# Patient Record
Sex: FEMALE | Race: OTHER | NOT HISPANIC OR LATINO | Employment: FULL TIME | URBAN - METROPOLITAN AREA
[De-identification: names, ages, dates, MRNs, and addresses within clinical notes are randomized per-mention and may not be internally consistent; named-entity substitution may affect disease eponyms.]

---

## 2017-08-01 ENCOUNTER — GENERIC CONVERSION - ENCOUNTER (OUTPATIENT)
Dept: OTHER | Facility: OTHER | Age: 47
End: 2017-08-01

## 2017-08-24 ENCOUNTER — ALLSCRIPTS OFFICE VISIT (OUTPATIENT)
Dept: OTHER | Facility: OTHER | Age: 47
End: 2017-08-24

## 2017-09-12 ENCOUNTER — GENERIC CONVERSION - ENCOUNTER (OUTPATIENT)
Dept: OTHER | Facility: OTHER | Age: 47
End: 2017-09-12

## 2017-09-21 ENCOUNTER — GENERIC CONVERSION - ENCOUNTER (OUTPATIENT)
Dept: OTHER | Facility: OTHER | Age: 47
End: 2017-09-21

## 2017-10-03 ENCOUNTER — GENERIC CONVERSION - ENCOUNTER (OUTPATIENT)
Dept: OTHER | Facility: OTHER | Age: 47
End: 2017-10-03

## 2017-10-12 ENCOUNTER — GENERIC CONVERSION - ENCOUNTER (OUTPATIENT)
Dept: OTHER | Facility: OTHER | Age: 47
End: 2017-10-12

## 2017-10-17 ENCOUNTER — GENERIC CONVERSION - ENCOUNTER (OUTPATIENT)
Dept: OTHER | Facility: OTHER | Age: 47
End: 2017-10-17

## 2017-11-06 ENCOUNTER — GENERIC CONVERSION - ENCOUNTER (OUTPATIENT)
Dept: OTHER | Facility: OTHER | Age: 47
End: 2017-11-06

## 2017-12-18 ENCOUNTER — ALLSCRIPTS OFFICE VISIT (OUTPATIENT)
Dept: OTHER | Facility: OTHER | Age: 47
End: 2017-12-18

## 2017-12-19 NOTE — PROGRESS NOTES
Assessment  1  Acute left otitis media (382 9) (H66 92)    Plan  Acute left otitis media    · Amoxicillin 500 MG Oral Tablet; TAKE 2 TABLET 3 times daily    Discussion/Summary    55yo female with left otitis media  has h/o bilateral ear tubes  given duration and worsening of symptoms do recommend initiating ABX therapy: Amox 1gm PO TID x 5 days  follow up prn  The patient was counseled regarding importance of compliance with treatment  The treatment plan was reviewed with the patient/guardian  The patient/guardian understands and agrees with the treatment plan      Chief Complaint  left ear pain      History of Present Illness  HPI: 55yo female with PMHx KIERRA and intermittent smoking presents c/o 1 week of facial pain, congestion, cough and in past 2 days developed left ear pain and muffled hearing  no fevers  + sick contact -> 2yo grandson  has been using nasal spray with no relief  cough is productive of clear to yellow sputum  nasal discharge was blood tinged today  Review of Systems   Constitutional: No fever, no chills, feels well, no tiredness, no recent weight gain or loss  ENT: as noted in HPI  Cardiovascular: no complaints of slow or fast heart rate, no chest pain, no palpitations, no leg claudication or lower extremity edema  Respiratory: cough, but-- no shortness of breath,-- no orthopnea,-- no wheezing,-- no shortness of breath during exertion-- and-- no PND  Gastrointestinal: no abdominal pain  ROS reviewed  Active Problems  1  Acute serous otitis media, unspecified laterality   2  Allergic rhinitis (477 9) (J30 9)   3  Amenorrhea (626 0) (N91 2)   4  Anxiety disorder (300 00) (F41 9)   5  Avulsion of fingertip, subsequent encounter (V58 89,883 0) (S61 209D)   6  Bacterial vaginosis (616 10,041 9) (N76 0,B96 89)   7  Breast cancer screening (V76 10) (Z12 39)   8  Chest congestion (786 9) (R09 89)   9  Chest pain (786 50) (R07 9)   10  Cigarette smoker (305 1) (F17 210)   11   Corneal abrasion (918 1) (S05 00XA)   12  Cough (786 2) (R05)   13  Dysuria (788 1) (R30 0)   14  Ear problems (V41 3) (H93 90)   15  Encounter for routine gynecological examination (V72 31) (Z01 419)   16  Encounter for smoking cessation counseling (V65 42,305 1) (Z71 6,Z72 0)   17  Eustachian tube dysfunction (381 81) (H69 80)   18  Fatigue (780 79) (R53 83)   19  Hyperlipidemia (272 4) (E78 5)   20  Nicotine dependence (305 1) (F17 200)   21  Pap smear for cervical cancer screening (V76 2) (Z12 4)   22  Plantar fasciitis (728 71) (M72 2)   23  Productive cough (786 2) (R05)   24  Sinus congestion (478 19) (R09 81)   25  Sore throat (462) (J02 9)   26  URI, acute (465 9) (J06 9)    Past Medical History  1  History of Acute nonsuppurative otitis media, unspecified laterality (381 00) (H65 199)   2  Encounter for smoking cessation counseling (V65 42,305 1) (Z71 6,Z72 0)   3  History of abdominal pain (V13 89) (Z87 898)   4  History of acute bronchitis (V12 69) (Z87 09)   5  History of Hypomenorrhea (626 1) (N91 5)   6  History of Nipple Discharge Right (611 79)   7  History of Routine Gynecological Exam With Cervical Pap Smear (V72 31)  Active Problems And Past Medical History Reviewed: The active problems and past medical history were reviewed and updated today  Family History  Mother    1  Family history of Acute Myocardial Infarction (V17 3)  Father    2  Family history of Diabetes Mellitus (V18 0)   3  Family history of Hyperlipidemia   4  Family history of Hypertension (V17 49)  Maternal Grandmother    5  Family history of malignant neoplasm of breast (V16 3) (Z80 3)    Social History   · Being A Social Drinker   · Cigarette smoker (305 1) (F17 210)   · Current every day smoker (305 1) (F17 200)  The social history was reviewed and updated today  Surgical History  1  Denied: History of Recent Surgery   2  History of Tubal Ligation    Current Meds   1  No Reported Medications Recorded    Allergies  1   No Known Drug Allergies  2  Pollen    Vitals   Recorded: 11MMR1539 09:01AM   Temperature 97 2 F   Heart Rate 68   Respiration 16   Systolic 947   Diastolic 70   Height 5 ft 3 in   Weight 178 lb 4 oz   BMI Calculated 31 58   BSA Calculated 1 84   Pain Scale 0     Physical Exam   Constitutional  General appearance: No acute distress, well appearing and well nourished  Eyes  Conjunctiva and lids: No swelling, erythema or discharge  Pupils and irises: Equal, round and reactive to light  Ears, Nose, Mouth, and Throat  External inspection of ears and nose: Normal    Otoscopic examination: Abnormal  -- right TM clear with scarred areas  left TM bulging and erythematous with areas of scars  Nasal mucosa, septum, and turbinates: Normal without edema or erythema  Oropharynx: Normal with no erythema, edema, exudate or lesions  Pulmonary  Respiratory effort: No increased work of breathing or signs of respiratory distress  Auscultation of lungs: Clear to auscultation  Cardiovascular  Auscultation of heart: Normal rate and rhythm, normal S1 and S2, without murmurs  Lymphatic  Palpation of lymph nodes in neck: No lymphadenopathy           Signatures   Electronically signed by : BHARATHI Vogel ; Dec 18 2017  9:24AM EST                       (Author)

## 2018-01-10 NOTE — PROGRESS NOTES
Assessment    1  Fatigue (780 79) (R53 83)   2  Encounter for preventive health examination (V70 0) (Z00 00)   3  Encounter for smoking cessation counseling (V65 42,305 1) (Z71 6,Z72 0)    Plan  Encounter for smoking cessation counseling, Nicotine dependence    · Chantix Starting Month Lamont 0 5 MG X 11 & 1 MG X 42 Oral Tablet; TAKE AS  DIRECTED PER PACKAGE INSTRUCTIONS  Fatigue    · (1) CBC/PLT/DIFF; Status:Complete;   Done: 31STI4681 11:00AM   · (1) COMPREHENSIVE METABOLIC PANEL; Status:Complete;   Done: 91DVT7579  11:00AM   · (1) TSH WITH FT4 REFLEX; Status:Active; Requested for:06May2016;   Hyperlipidemia    · Atorvastatin Calcium 10 MG Oral Tablet (Lipitor)   · Atorvastatin Calcium 20 MG Oral Tablet; TAKE 1 TABLET DAILY AS DIRECTED   · (1) LIPID PANEL, FASTING; Status:Active; Requested for:06May2016;     Discussion/Summary  health maintenance visit healthy adult female Currently, she eats a healthy diet and has an inadequate exercise regimen  the risks and benefits of cervical cancer screening were discussed next cervical cancer screening is due NOW Breast cancer screening: the next mammogram is due at age 48 and breast cancer screening is not indicated  Colorectal cancer screening: colorectal cancer screening is not indicated  Osteoporosis screening: bone mineral density testing is not indicated  Screening lab work includes hemoglobin, glucose, lipid profile and thyroid function testing  Advice and education were given regarding nutrition, aerobic exercise, tobacco cessation and sunscreen use  Patient discussion: discussed with the patient  56 yo F with:  1  Fatigue - will check CBC, CMP, TSH to r/o anemia, thyroid dysfunction, electrolyte abnormality, DM  2  Smoking Cessation - Pt has 30 pack year smoking history  Has desire to quit  Has tried wellbutrin, nicotine patch and inhaler in the past that has not helped  Has tried Chantix which had helped prior to her insurance not covering   Therefore will try chantix again  If insurance does not approve, we will appeal      3  Health maintenance - Elevated BP - repeat BP in office was 138/80, which is at goal  Will check FLP and start statin as indicated  Mammogram will start at age 48 per USPSTF guidelines  Will have pt return to office in 1-2 weeks for PAP w/ HPV  Colonoscopy not due yet  Counseled on aerobic exercise 150 minutes/week  Will call with results  Pt agrees with plan  Possible side effects of new medications were reviewed with the patient/guardian today  The patient was counseled regarding instructions for management, risk factor reductions, prognosis, patient and family education, impressions, risks and benefits of treatment options, importance of compliance with treatment  Self Referrals: No      Chief Complaint  Annual physical      History of Present Illness  HM, Adult Female: The patient is being seen for a health maintenance evaluation  The last health maintenance visit was 1 year(s) ago  Social History: Household members include spouse and Brother and grandson  She is   Work status: working full time and occupation:  at [employer], [place]  The patient is a current cigarette smoker  She has smoked for 30 year(s) and has 30 pack year(s) of cigarette use  She is ready to quit using tobacco  She reports occasional alcohol use and Twice a year  She has never used illicit drugs  General Health: The patient's health since the last visit is described as good  She has regular dental visits  The patient brushes 1 time(s) a day  Dental problems: no tooth pain  She complains of vision problems  Vision care includes wearing glasses  She denies hearing loss  Lifestyle:  She consumes a diverse and healthy diet  She does not have any weight concerns  She does not exercise regularly  She uses tobacco  She denies alcohol use  She denies drug use  Reproductive health: the patient is perimenopausal   she reports normal menses   she is not sexually active  Screening: Cervical cancer screening includes a pap smear performed 7017 and uncertain timing of her last human papilloma virus screening  Breast cancer screening includes no previous mammogram  She hasn't been previously screened for colorectal cancer  Metabolic screening includes uncertain timing of her last lipid profile, uncertain timing of her last glucose screening and uncertain timing of her last thyroid function test      Cardiovascular risk factors: high LDL cholesterol, tobacco use and sedentary lifestyle, but no stress, no obesity and no illicit drug use  General health risks: no previous breast cancer, no abnormal cervical cytology and no positive screening for human papilloma virus  HPI: 56 yo F presents to clinic for annual physical  At this time, pt has no active complaints  Denies fever, chills, CP, sob, palpitations, HA, dizziness, lightheadedness, numbness, tingling, abdominal pain, n/v/d/c, urinary problems  Does occasionally feel fatigue  Is interesting in quitting smoking  Tried wellbutrin but states it did not work  Tried nicotine patches and inhaler which did not work  Tried chantix in the past 1 year ago, which did help  But then stopped because of lack of insurance  Review of Systems    Constitutional: no fever, not feeling poorly, no recent weight gain, no chills and not feeling tired  Eyes: no eye pain, no eyesight problems and no purulent discharge from the eyes  ENT: no earache and no nasal discharge  Cardiovascular: No complaints of slow heart rate, no fast heart rate, no chest pain, no palpitations, no leg claudication, no lower extremity edema  Respiratory: No complaints of shortness of breath, no wheezing, no cough, no SOB on exertion, no orthopnea, no PND  Gastrointestinal: No complaints of abdominal pain, no constipation, no nausea or vomiting, no diarrhea, no bloody stools  Genitourinary: no dysuria, no pelvic pain and no incontinence  Musculoskeletal: no arthralgias and no myalgias  Integumentary: no rashes, no itching and no skin lesions  Neurological: no headache, no numbness, no tingling, no dizziness and no limb weakness  Psychiatric: no anxiety and no depression  Endocrine: no muscle weakness  Hematologic/Lymphatic: no swollen glands and no swollen glands in the neck  Active Problems     1  Acute serous otitis media, unspecified laterality   2  Amenorrhea (626 0) (N91 2)   3  Chest congestion (786 9) (R09 89)   4  Chest pain (786 50) (R07 9)   5  Corneal abrasion (918 1) (S05 00XA)   6  Dysuria (788 1) (R30 0)   7  Ear problems (V41 3) (H93 90)   8  Encounter for smoking cessation counseling (V65 42,305 1) (Z71 6,Z72 0)   9  Hyperlipidemia (272 4) (E78 5)   10  Nicotine dependence (305 1) (F17 200)   11  Plantar fasciitis (728 71) (M72 2)   12  Productive cough (786 2) (R05)   13  Sinus congestion (478 19) (R09 81)   14   Sore throat (462) (J02 9)   15  Upper respiratory infection (465 9) (J06 9)    Cough (786 2) (R05)       Eustachian tube dysfunction (381 81) (H69 80)       Cigarette smoker (305 1) (F17 210)          Past Medical History    · History of Acute nonsuppurative otitis media, unspecified laterality (381 00) (H65 199)   · Encounter for smoking cessation counseling (V65 42,305 1) (Z71 6,Z72 0)   · History of abdominal pain (V13 89) (D59 006)   · History of acute bronchitis (V12 69) (Z87 09)   · History of Hypomenorrhea (626 1) (N91 5)   · History of Nipple Discharge Right (611 79)   · History of Routine Gynecological Exam With Cervical Pap Smear (V72 31)    Surgical History    · Denied: History of Recent Surgery   · History of Tubal Ligation    Family History  Mother    · Family history of Acute Myocardial Infarction (V17 3)  Father    · Family history of Diabetes Mellitus (V18 0)   · Family history of Hyperlipidemia   · Family history of Hypertension (V17 49)    Social History     · Being A Social Drinker    Current every day smoker (305 1) (F17 200)       Cigarette smoker (305 1) (F17 210)          Current Meds   1  Atorvastatin Calcium 10 MG Oral Tablet; TAKE 1 TABLET DAILY AT BEDTIME; Therapy: 24GCO8687 to (Evaluate:18Jun2013); Last Rx:20Mar2013 Ordered   2  BuPROPion HCl ER (Smoking Det) 150 MG Oral Tablet Extended Release 12 Hour;   TAKE 1 TABLET ONCE A DAY FOR 2 DAYS THEN TAKE 1 TABLET TWICE A   DAY THEREAFTER; Therapy: 54RBE0657 to (Evaluate:27Szr9688)  Requested for: 08YYO9875; Last   Rx:11Jan2016 Ordered   3  Claritin 10 MG Oral Tablet; TAKE 1 TABLET AT BEDTIME; Therapy: 49BSO6400 to [phone]  Requested for: 19RPV5188; Last   Rx:01Nov2013 Ordered   4  Fexofenadine-Pseudoephed -240 MG Oral Tablet Extended Release 24 Hour;   TAKE 1 TABLET DAILY; Therapy: 43EDT3709 to (Lul Carlson)  Requested for: 18Apr2016; Last   Rx:26Vts5374 Ordered   5  Fluticasone Propionate 50 MCG/ACT Nasal Suspension; USE 2 SPRAYS IN EACH   NOSTRIL ONCE DAILY; Therapy: 50Wvq4046 to (Last Rx:15Kfz8591)  Requested for: 77Vng4913 Ordered   6  Lovastatin 20 MG Oral Tablet; TAKE 10 MG Daily; Therapy: 71ECP4326 to (Evaluate:15Ncp0470); Last Rx:96Fma2276 Ordered   7  Naproxen 500 MG Oral Tablet; TAKE 1 TABLET EVERY 12 HOURS DAILY; Therapy: 59RKW4203 to (Abdiel Chew)  Requested for: 12SSO2251; Last   FK:16MKN1507 Ordered   8  Simply Saline 0 9 % Nasal Aerosol Solution; USE AS DIRECTED ON PACKAGE; Therapy: 73ONV6702 to (Zully Sermons)  Requested for: 14NPD3848; Last   Rx:01Nov2013 Ordered    Allergies    1  No Known Drug Allergies    2  Pollen    Vitals   Recorded: 87FIX3083 04:39PM   Temperature 97 8 F   Heart Rate 82   Respiration 16   Systolic 687   Diastolic 90   Height 5 ft 3 in   Weight 172 lb 2 oz   BMI Calculated 30 49   BSA Calculated 1 81   Pain Scale 0     Physical Exam    Constitutional   General appearance: No acute distress, well appearing and well nourished      Eyes Conjunctiva and lids: No swelling, erythema or discharge  Pupils and irises: Equal, round and reactive to light  Ears, Nose, Mouth, and Throat   External inspection of ears and nose: Normal     Otoscopic examination: Abnormal     Oropharynx: Normal with no erythema, edema, exudate or lesions  Pulmonary   Respiratory effort: No increased work of breathing or signs of respiratory distress  Auscultation of lungs: Clear to auscultation  Cardiovascular   Auscultation of heart: Normal rate and rhythm, normal S1 and S2, without murmurs  Examination of extremities for edema and/or varicosities: Normal     Abdomen   Abdomen: Non-tender, no masses  Liver and spleen: No hepatomegaly or splenomegaly  Lymphatic   Palpation of lymph nodes in neck: No lymphadenopathy  Musculoskeletal   Gait and station: Normal     Digits and nails: Normal without clubbing or cyanosis  Inspection/palpation of joints, bones, and muscles: Normal     Skin   Skin and subcutaneous tissue: Normal without rashes or lesions  Neurologic   Cranial nerves: Cranial nerves 2-12 intact  Sensation: No sensory loss  Psychiatric   Orientation to person, place, and time: Normal        Results/Data  (1) COMPREHENSIVE METABOLIC PANEL 86RCI7001 92:50KU Radha Quivers Order Number: [id number]  TW Order Number: [id number] Order Number: [id number] Order Number: [id number]     Test Name Result Flag Reference   GLUCOSE,RANDM 88 mg/dL     If the patient is fasting, the ADA then defines impaired fasting glucose as > 100 mg/dL and diabetes as > or equal to 123 mg/dL     SODIUM 140 mmol/L  136-145   POTASSIUM 3 7 mmol/L  3 5-5 3   CHLORIDE 108 mmol/L  100-108   CARBON DIOXIDE 24 mmol/L  21-32   ANION GAP (CALC) 8 mmol/L  4-13   BLOOD UREA NITROGEN 18 mg/dL  5-25   CREATININE 0 64 mg/dL  0 60-1 30   Standardized to IDMS reference method   CALCIUM 8 4 mg/dL  8 3-10 1   BILI, TOTAL 0 36 mg/dL  0 20-1 00   ALK PHOSPHATAS 95 U/L     ALT (SGPT) 15 U/L  12-78   AST(SGOT) 6 U/L  5-45   ALBUMIN 3 8 g/dL  3 5-5 0   TOTAL PROTEIN 6 8 g/dL  6 4-8 2   eGFR Non-African American      >60 0 ml/min/1 73sq m   [facility] Disease Education Program recommendations are as follows:  GFR calculation is accurate only with a steady state creatinine  Chronic Kidney disease less than 60 ml/min/1 73 sq  meters  Kidney failure less than 15 ml/min/1 73 sq  meters  (1) CBC/PLT/DIFF 32ALR7636 11:00AM [employer] Order Number: [id number]  TW Order Number: [id number]     Test Name Result Flag Reference   WBC COUNT 9 13 Thousand/uL  4 31-10 16   RBC COUNT 4 09 Million/uL  3 81-5 12   HEMOGLOBIN 12 4 g/dL  11 5-15 4   HEMATOCRIT 36 8 %  34 8-46  1   MCV 90 fL  82-98   MCH 30 3 pg  26 8-34 3   MCHC 33 7 g/dL  31 4-37 4   RDW 13 7 %  11 6-15 1   MPV 9 5 fL  8 9-12 7   PLATELET COUNT 341 Thousands/uL  149-390   nRBC AUTOMATED 0 /100 WBCs     NEUTROPHILS RELATIVE PERCENT 63 %  43-75   LYMPHOCYTES RELATIVE PERCENT 30 %  14-44   MONOCYTES RELATIVE PERCENT 6 %  4-12   EOSINOPHILS RELATIVE PERCENT 1 %  0-6   BASOPHILS RELATIVE PERCENT 0 %  0-1   NEUTROPHILS ABSOLUTE COUNT 5 67 Thousands/?L  1 85-7 62   LYMPHOCYTES ABSOLUTE COUNT 2 74 Thousands/?L  0 60-4 47   MONOCYTES ABSOLUTE COUNT 0 55 Thousand/?L  0 17-1 22   EOSINOPHILS ABSOLUTE COUNT 0 13 Thousand/?L  0 00-0 61   BASOPHILS ABSOLUTE COUNT 0 03 Thousands/?L  0 00-0 10     (1) LIPID PANEL, FASTING 02VEG3330 11:00AM Cherrie Bertrandwater Order Number: [id number]  TW Order Number: [id number] Order Number: [id number] Order Number: [id number]     Test Name Result Flag Reference   CHOLESTEROL 268 mg/dL H    HDL,DIRECT 39 mg/dL L 40-60   Specimen collection should occur prior to Metamizole administration due to the potential for falsely depressed results     LDL CHOLESTEROL CALCULATED 217 mg/dL H 0-100 Triglyceride:         Normal              <150 mg/dl       Borderline High    150-199 mg/dl       High               200-499 mg/dl       Very High          >499 mg/dl  Cholesterol:         Desirable        <200 mg/dl      Borderline High  200-239 mg/dl      High             >239 mg/dl  HDL Cholesterol:        High    >59 mg/dL      Low     <41 mg/dL  LDL CALCULATED:    This screening LDL is a calculated result  It does not have the accuracy of the Direct Measured LDL in the monitoring of patients with hyperlipidemia and/or statin therapy  Direct Measure LDL (XBE016) must be ordered separately in these patients  TRIGLYCERIDES 60 mg/dL  <=150   Specimen collection should occur prior to N-Acetylcysteine or Metamizole administration due to the potential for falsely depressed results  (1) TSH WITH FT4 REFLEX 14BMW2771 11:00AM Shirley Deepa Order Number: [id number]  TW Order Number: [id number] Order Number: [id number] Order Number: [id number]     Test Name Result Flag Reference   TSH 1 610 uIU/mL  0 358-3 740   The recommended reference ranges for TSH during pregnancy are as follows:  First trimester 0 1 to 2 5 uIU/mL  Second trimester  0 2 to 3 0 uIU/mL  Third trimester 0 3 to 3 0 uIU/m       Attending Note  Attending Note: Attending Note: I discussed the case with the Resident and reviewed the Resident's note, I supervised the Resident and I agree with the Resident management plan as it was presented to me  Level of Participation: I was present in clinic, but did not examine the patient  I agree with the Resident's note        Future Appointments    Date/Time Provider Specialty Site   05/20/2016 04:00 PM [facility]     Signatures   Electronically signed by : Nikki Bradford, ; May  6 2016  5:30PM EST                       (Author)    Electronically signed by : Katherine David DO; May 13 2016  1:58PM EST (Author)

## 2018-01-10 NOTE — MISCELLANEOUS
Provider Comments  Provider Comments:   PT WAS A NO SHOW TODAY      Signatures   Electronically signed by : Wilda Estevez, ; Apr 26 2016  6:55PM EST                       (Author)    Electronically signed by : Baldo Lawler, ; Apr 27 2016  5:34AM EST                       (Author)    Electronically signed by : Verner Muff, M D ; May  3 2016  1:45PM EST                       (Author)

## 2018-01-12 NOTE — MISCELLANEOUS
Provider Comments  Provider Comments:   PT NO SHOWED TODAY      Signatures   Electronically signed by : Iliana Taylor, ; Oct 12 2017  5:23PM EST                       (Author)

## 2018-01-14 VITALS
HEIGHT: 63 IN | WEIGHT: 184 LBS | BODY MASS INDEX: 32.6 KG/M2 | HEART RATE: 74 BPM | TEMPERATURE: 97.8 F | SYSTOLIC BLOOD PRESSURE: 132 MMHG | RESPIRATION RATE: 18 BRPM | DIASTOLIC BLOOD PRESSURE: 70 MMHG

## 2018-01-22 VITALS
SYSTOLIC BLOOD PRESSURE: 134 MMHG | HEIGHT: 63 IN | RESPIRATION RATE: 16 BRPM | HEART RATE: 68 BPM | BODY MASS INDEX: 31.58 KG/M2 | TEMPERATURE: 97.2 F | WEIGHT: 178.25 LBS | DIASTOLIC BLOOD PRESSURE: 70 MMHG

## 2018-01-22 VITALS
TEMPERATURE: 99.2 F | RESPIRATION RATE: 14 BRPM | SYSTOLIC BLOOD PRESSURE: 142 MMHG | WEIGHT: 187.38 LBS | DIASTOLIC BLOOD PRESSURE: 80 MMHG | BODY MASS INDEX: 33.2 KG/M2 | HEART RATE: 88 BPM | HEIGHT: 63 IN

## 2018-01-30 ENCOUNTER — OFFICE VISIT (OUTPATIENT)
Dept: FAMILY MEDICINE CLINIC | Facility: CLINIC | Age: 48
End: 2018-01-30
Payer: COMMERCIAL

## 2018-01-30 ENCOUNTER — TELEPHONE (OUTPATIENT)
Dept: OTHER | Facility: HOSPITAL | Age: 48
End: 2018-01-30

## 2018-01-30 VITALS
RESPIRATION RATE: 14 BRPM | DIASTOLIC BLOOD PRESSURE: 80 MMHG | BODY MASS INDEX: 30.82 KG/M2 | HEIGHT: 65 IN | HEART RATE: 82 BPM | WEIGHT: 185 LBS | TEMPERATURE: 98.9 F | SYSTOLIC BLOOD PRESSURE: 144 MMHG

## 2018-01-30 DIAGNOSIS — N95.0 POSTMENOPAUSAL BLEEDING: ICD-10-CM

## 2018-01-30 DIAGNOSIS — R19.5 LOOSE STOOLS: ICD-10-CM

## 2018-01-30 DIAGNOSIS — R10.9 FLANK PAIN: Primary | ICD-10-CM

## 2018-01-30 LAB
SL AMB  POCT GLUCOSE, UA: NEGATIVE
SL AMB LEUKOCYTE ESTERASE,UA: NEGATIVE
SL AMB POCT BILIRUBIN,UA: NEGATIVE
SL AMB POCT BLOOD,UA: NEGATIVE
SL AMB POCT CLARITY,UA: CLEAR
SL AMB POCT COLOR,UA: YELLOW
SL AMB POCT KETONES,UA: NEGATIVE
SL AMB POCT NITRITE,UA: NEGATIVE
SL AMB POCT PH,UA: 6
SL AMB POCT SPECIFIC GRAVITY,UA: 1.01

## 2018-01-30 PROCEDURE — 99213 OFFICE O/P EST LOW 20 MIN: CPT | Performed by: FAMILY MEDICINE

## 2018-01-30 PROCEDURE — 81002 URINALYSIS NONAUTO W/O SCOPE: CPT | Performed by: FAMILY MEDICINE

## 2018-01-30 NOTE — ASSESSMENT & PLAN NOTE
-Transvaginal ultrasound ordered to rule out endometrial pathology  -follow up after ultrasound complete

## 2018-01-30 NOTE — PATIENT INSTRUCTIONS
Postmenopausal bleeding  -Transvaginal ultrasound ordered to rule out endometrial pathology  -follow up after ultrasound complete    Flank pain  -ddx includes lumbosacral pain vs kidney stone  -CT renal study ordered to rule out kidney stone  -follow up in 1 month to re-assess    Loose stools  -likely 2/2 h/o IBS  -follow up in 1 month   If persistent issue, consider further evaluation

## 2018-01-30 NOTE — PROGRESS NOTES
Assessment/Plan:    Postmenopausal bleeding  -Transvaginal ultrasound ordered to rule out endometrial pathology  -follow up after ultrasound complete    Flank pain  -ddx includes lumbosacral pain vs kidney stone  -CT renal study ordered to rule out kidney stone  -follow up in 1 month to re-assess    Loose stools  -likely 2/2 h/o IBS  -follow up in 1 month  If persistent issue, consider further evaluation      Subjective:      Patient ID: Farhana Williamson is a 52 y o  female  HPI   Patient is a pleasant 53 yo F presenting for acute visit for vaginal spotting, diarrhea  Friday, started having vaginal spotting for 3 days  Last night, started having diarrhea x2; loose, no blood, nonwatery  Notes h/o constipation/diarrhea alternating when she was dx with IBS  Last time had period was August, 2016  No spotting since  Cramping  Spotting on toilet paper and on underwear  No relation to bowel movement  No gross hematuria  2 weeks ago, also started having b/l back pain  History of kidney stones in the past - feels like the beginning of when she had them in the past  Last episode about 4 years ago  The following portions of the patient's history were reviewed and updated as appropriate: allergies, current medications, past family history, past medical history, past social history, past surgical history and problem list     Review of Systems   Constitutional: Negative for chills and fever  HENT: Negative for congestion, postnasal drip and sore throat  Respiratory: Negative for cough, shortness of breath and wheezing  Cardiovascular: Negative for chest pain and palpitations  Gastrointestinal: Positive for diarrhea  Negative for abdominal pain, nausea and vomiting  Genitourinary: Negative for decreased urine volume and difficulty urinating  Musculoskeletal: Positive for back pain  Neurological: Negative for dizziness and light-headedness           Objective:  Vitals:    01/30/18 1602   BP: 144/80   Pulse: 82   Resp: 14   Temp: 98 9 °F (37 2 °C)          Physical Exam   Constitutional: She is oriented to person, place, and time  She appears well-developed and well-nourished  No distress  HENT:   Head: Normocephalic and atraumatic  Eyes: Conjunctivae are normal  Right eye exhibits no discharge  Left eye exhibits no discharge  Neck: Neck supple  Cardiovascular: Normal rate and regular rhythm  Pulmonary/Chest: Effort normal and breath sounds normal  No respiratory distress  She has no wheezes  She has no rales  Abdominal: Soft  Bowel sounds are normal  She exhibits no distension  There is no tenderness  There is no rebound  Musculoskeletal:   Mild CVA tenderness b/l  SI joint tenderness b/l  Neurological: She is alert and oriented to person, place, and time  No cranial nerve deficit  Skin: Skin is warm and dry  No rash noted  She is not diaphoretic  No erythema  No pallor  Psychiatric: She has a normal mood and affect  Her behavior is normal  Judgment and thought content normal    Nursing note and vitals reviewed

## 2018-01-30 NOTE — ASSESSMENT & PLAN NOTE
-ddx includes lumbosacral pain vs kidney stone  -CT renal study ordered to rule out kidney stone  -follow up in 1 month to re-assess

## 2018-01-30 NOTE — TELEPHONE ENCOUNTER
Patient called the triage line with complaints of vaginal spotting and diarrhea per phone message- call returned with no answer  Left a message instructing her to call the triage line again if she has further concerns or questions, and to call the office in the morning

## 2018-02-01 ENCOUNTER — TELEPHONE (OUTPATIENT)
Dept: INTERNAL MEDICINE CLINIC | Facility: CLINIC | Age: 48
End: 2018-02-01

## 2018-02-01 NOTE — TELEPHONE ENCOUNTER
This patient was scheduled for CT OF ABDOMEN AND PELVIS ON 02/07/2018  The information I submitted was not enough to get it approved, so [employer]  is requesting a [id number]  If you are able to do so, the number is below  If you wish to withdraw this request let us know so we can call central scheduling to cancel their upcoming appointment  This case is time sensitive  Please call peer to peer as soon as you can  [employer]:  [phone]    Insurance: [address]   ID#: [id number]    [address] [place] [address] Ave!

## 2018-02-05 NOTE — TELEPHONE ENCOUNTER
Completed process for Auth  Updated referral in Epic with Auth number  Reason they stated no Auth was submitted was because Auth was under Dr Rohit Martinez as Provider not Dr Omero Lora

## 2018-02-05 NOTE — TELEPHONE ENCOUNTER
Called the peer to peer line for the patient's insurance and spoke with a physician  They had no records of having the order placed nor authorization process begun, including that a peer to peer was needed  I then spoke to an authorization specialist, Rahat Obrien  (he would not give me the rest of his last name) and I went through the authorization process with him  He said that it is approved, valid 2-5-18 to 3-6-18  Order number [id number]  Thank you!

## 2018-02-08 ENCOUNTER — HOSPITAL ENCOUNTER (OUTPATIENT)
Dept: RADIOLOGY | Facility: HOSPITAL | Age: 48
Discharge: HOME | End: 2018-02-08
Payer: COMMERCIAL

## 2018-02-08 ENCOUNTER — TELEPHONE (OUTPATIENT)
Dept: FAMILY MEDICINE CLINIC | Facility: CLINIC | Age: 48
End: 2018-02-08

## 2018-02-08 ENCOUNTER — TRANSCRIBE ORDERS (OUTPATIENT)
Dept: ADMISSIONS | Facility: HOSPITAL | Age: 48
End: 2018-02-08

## 2018-02-08 DIAGNOSIS — R10.9 FLANK PAIN: ICD-10-CM

## 2018-02-08 DIAGNOSIS — N95.0 POSTMENOPAUSAL BLEEDING: ICD-10-CM

## 2018-02-08 PROCEDURE — 76830 TRANSVAGINAL US NON-OB: CPT

## 2018-02-08 PROCEDURE — 74176 CT ABD & PELVIS W/O CONTRAST: CPT

## 2018-02-08 PROCEDURE — 76856 US EXAM PELVIC COMPLETE: CPT

## 2018-02-08 NOTE — TELEPHONE ENCOUNTER
Called patient to discuss CT renal study and transvaginal U/S that was completed 2/8/18  The CT showed a punctate non-obstructing right renal calculus but no obstructive uropathy or hydronephrosis  The transvaginal U/S showed unremarkable endometrium and small cystic foci along the endocervical mucosa  Reassured patient that her imaging studies were unremarkable  Patient notes that her spotting has resolved but she's still having lower back pain  Advised that she could use Tylenol or Motrin PRN for her back pain, and that if it did not significantly improve over the coming couple weeks to make an appt to further discuss  Patient voiced understanding and had no further questions

## 2018-03-13 ENCOUNTER — OFFICE VISIT (OUTPATIENT)
Dept: FAMILY MEDICINE CLINIC | Facility: CLINIC | Age: 48
End: 2018-03-13
Payer: COMMERCIAL

## 2018-03-13 VITALS
WEIGHT: 183.8 LBS | BODY MASS INDEX: 31.38 KG/M2 | RESPIRATION RATE: 16 BRPM | SYSTOLIC BLOOD PRESSURE: 130 MMHG | HEART RATE: 86 BPM | DIASTOLIC BLOOD PRESSURE: 90 MMHG | HEIGHT: 64 IN

## 2018-03-13 DIAGNOSIS — J06.9 VIRAL UPPER RESPIRATORY TRACT INFECTION: ICD-10-CM

## 2018-03-13 DIAGNOSIS — N95.0 POSTMENOPAUSAL BLEEDING: Primary | ICD-10-CM

## 2018-03-13 PROCEDURE — 99213 OFFICE O/P EST LOW 20 MIN: CPT | Performed by: FAMILY MEDICINE

## 2018-03-13 RX ORDER — ATORVASTATIN CALCIUM 10 MG/1
10 TABLET, FILM COATED ORAL
COMMUNITY
End: 2018-03-22

## 2018-03-13 RX ORDER — BENZONATATE 200 MG/1
200 CAPSULE ORAL 3 TIMES DAILY PRN
Qty: 50 CAPSULE | Refills: 1 | Status: SHIPPED | OUTPATIENT
Start: 2018-03-13 | End: 2018-03-28

## 2018-03-13 RX ORDER — IBUPROFEN 600 MG/1
600 TABLET ORAL EVERY 6 HOURS
COMMUNITY
Start: 2016-10-27 | End: 2018-04-05

## 2018-03-13 RX ORDER — SENNOSIDES 8.6 MG
650 CAPSULE ORAL EVERY 8 HOURS
COMMUNITY
End: 2018-03-22

## 2018-03-13 NOTE — PROGRESS NOTES
Laura Granados 1970 female MRN: [id number]    Family Medicine Follow-up Visit    ASSESSMENT/PLAN  Problem List Items Addressed This Visit     Postmenopausal bleeding - Primary     -unremarkable transvaginal u/s both through [place] [address] and [facility] (repeated when in their ER)  -discussed treatment and further work up options including EMB and hormone treatment  Patient interested in following up with OB/GYN as patient is also interested in getting hysterectomy to definitely treat bleeding  Discussed that hysterectomy may not be the best treatment yet, but am happy to refer to specialist for further evaluation  -OB/GYN referral given, appreciate their recs  -follow up as needed         Relevant Orders    Ambulatory referral to Obstetrics / Gynecology      Other Visit Diagnoses     Viral upper respiratory tract infection        -reassurance given, supportive care  -Magic Mouthwash and Tessalon Perles for symptomatic tx of sore throat and cough  -f/u PRN    Relevant Medications    al mag oxide-diphenhydramine-lidocaine viscous (MAGIC MOUTHWASH) 1:1:1 suspension    benzonatate (TESSALON) 200 MG capsule              No future appointments  SUBJECTIVE  CC: Follow-up (went to ER with heavy bleading, follow up from a month ago ) and Sore Throat (last couple of days )      HPI:  Laura Granados is a 50 y o  female who presents for follow up post-menopausal bleeding  Patient was seen 1/30/18 and at this time, was just on the toilet paper  Now, it got worse and went to [facility] 3/9/18 for worsening bleeding  Thursday, started spotting again and then starting having clots - went through 50 tampons  In ED, got Zofran for nausea  Also starting having abdominal cramping  Similar to previous periods but heavier than normal flow  No bowel/bladder incontinence or flank pain  Last period was over 2 years ago  Also notes 2 days of URI symptoms including ear pressure  No fevers/chills  Mild wheezing  Every day smoker  Review of Systems   Constitutional: Negative for chills and fever  HENT: Positive for congestion and ear pain  Respiratory: Positive for wheezing  Negative for cough and shortness of breath  Cardiovascular: Negative for chest pain  Gastrointestinal: Negative for blood in stool, constipation, nausea and vomiting  Genitourinary: Positive for pelvic pain and vaginal bleeding          Cramping         Historical Information   The patient history was reviewed as follows:    Past Medical History:   Diagnosis Date    Discharge from right nipple     last assessed 03/20/2013    Hyperlipidemia      Past Surgical History:   Procedure Laterality Date    TUBAL LIGATION      WRIST SURGERY Bilateral      Family History   Problem Relation Age of Onset    Heart attack Mother     Diabetes Father     Hypertension Father     Hyperlipidemia Father     Breast cancer Maternal Grandmother       Social History   History   Alcohol Use    Yes     Comment: occasional     History   Drug Use No     History   Smoking Status    Former Smoker   Smokeless Tobacco    Former User       Medications:     Current Outpatient Prescriptions:     ibuprofen (MOTRIN) 600 mg tablet, Take 600 mg by mouth every 6 (six) hours, Disp: , Rfl:     acetaminophen (TYLENOL) 650 mg CR tablet, Take 650 mg by mouth every 8 (eight) hours, Disp: , Rfl:     al mag oxide-diphenhydramine-lidocaine viscous (MAGIC MOUTHWASH) 1:1:1 suspension, Swish and spit 10 mL every 4 (four) hours as needed for mouth pain or discomfort for up to 10 days, Disp: 180 mL, Rfl: 1    atorvastatin (LIPITOR) 10 mg tablet, Take 10 mg by mouth, Disp: , Rfl:     benzonatate (TESSALON) 200 MG capsule, Take 1 capsule (200 mg total) by mouth 3 (three) times a day as needed for cough for up to 15 days, Disp: 50 capsule, Rfl: 1  Allergies   Allergen Reactions    Pollen Extract Allergic Rhinitis and Sneezing       OBJECTIVE    Vitals:   Vitals:    03/13/18 1446 BP: 130/90   Pulse: 86   Resp: 16   Weight: 83 4 kg (183 lb 12 8 oz)   Height: 5' 4 4" (1 636 m)           Physical Exam   Constitutional: She is oriented to person, place, and time  She appears well-developed and well-nourished  No distress  HENT:   Head: Normocephalic and atraumatic  Eyes: Conjunctivae are normal  Left eye exhibits no discharge  Neck: Neck supple  Cardiovascular: Normal rate and regular rhythm  Pulmonary/Chest: Effort normal and breath sounds normal  No respiratory distress  She has no wheezes  She has no rales  Abdominal: Soft  Bowel sounds are normal  She exhibits no distension  There is tenderness (suprapubic tenderness b/l with deep palpation)  There is no rebound  Neurological: She is alert and oriented to person, place, and time  No cranial nerve deficit  Skin: Skin is warm and dry  No rash noted  She is not diaphoretic  No erythema  No pallor  Psychiatric: She has a normal mood and affect  Her behavior is normal  Judgment and thought content normal    Nursing note and vitals reviewed             Kalpana Willis DO, PGY-3  [facility]   3/13/2018

## 2018-03-13 NOTE — ASSESSMENT & PLAN NOTE
-unremarkable transvaginal u/s both through [address] and [facility] (repeated when in their ER)  -discussed treatment and further work up options including EMB and hormone treatment  Patient interested in following up with OB/GYN as patient is also interested in getting hysterectomy to definitely treat bleeding   Discussed that hysterectomy may not be the best treatment yet, but am happy to refer to specialist for further evaluation  -OB/GYN referral given, appreciate their recs  -follow up as needed

## 2018-03-22 ENCOUNTER — OFFICE VISIT (OUTPATIENT)
Dept: OBGYN CLINIC | Facility: HOSPITAL | Age: 48
End: 2018-03-22
Payer: COMMERCIAL

## 2018-03-22 VITALS
HEART RATE: 90 BPM | HEIGHT: 63 IN | BODY MASS INDEX: 32.6 KG/M2 | WEIGHT: 184 LBS | SYSTOLIC BLOOD PRESSURE: 148 MMHG | DIASTOLIC BLOOD PRESSURE: 90 MMHG

## 2018-03-22 DIAGNOSIS — N95.0 POSTMENOPAUSAL BLEEDING: Primary | ICD-10-CM

## 2018-03-22 PROCEDURE — 88305 TISSUE EXAM BY PATHOLOGIST: CPT | Performed by: PATHOLOGY

## 2018-03-22 PROCEDURE — 58100 BIOPSY OF UTERUS LINING: CPT | Performed by: OBSTETRICS & GYNECOLOGY

## 2018-03-22 PROCEDURE — 99213 OFFICE O/P EST LOW 20 MIN: CPT | Performed by: OBSTETRICS & GYNECOLOGY

## 2018-03-22 RX ORDER — AZITHROMYCIN 250 MG/1
TABLET, FILM COATED ORAL
Refills: 0 | COMMUNITY
Start: 2018-03-15 | End: 2018-03-22

## 2018-03-22 RX ORDER — DEXTROMETHORPHAN HYDROBROMIDE AND PROMETHAZINE HYDROCHLORIDE 15; 6.25 MG/5ML; MG/5ML
5 SYRUP ORAL 4 TIMES DAILY
COMMUNITY
Start: 2018-03-14 | End: 2018-03-24

## 2018-03-22 NOTE — PROGRESS NOTES
Endometrial biopsy  Date/Time: 3/22/2018 2:01 PM  Performed by: Hayn Mejia by: Morgan Lawler     Consent:     Consent obtained:  Verbal    Consent given by:  Patient    Procedural risks discussed:  Bleeding, failure rate, infection, possible continued pain and repeat procedure    Patient questions answered: yes      Patient agrees, verbalizes understanding, and wants to proceed: yes      Instructions and paperwork completed: yes    Indication:     Indications: Post-menopausal bleeding      Chronicity of post-menopausal bleeding:  New    Progression of post-menopausal bleeding:  Resolved  Pre-procedure:     Pre-procedure timeout performed: yes    Procedure:     Procedure: endometrial biopsy with Pipelle      A bivalve speculum was placed in the vagina: yes      Cervix cleaned and prepped: yes      A paracervical block was performed: no      An intracervical block was performed: no      The cervix was dilated: no      Uterus sounded: yes      Uterus sound depth (cm):  8    Specimen collected: specimen collected and sent to pathology      Patient tolerated procedure well with no complications: yes    Findings:     Uterus size:  Non-gravid    Cervix: normal      Adnexa: normal    Comments:      Pt is a 48yo P2 who was referred to [facility] from PCP for Postmenopausal bleeding needing EMB  Pt states LMP was 8/2016 and did not have any vaginal bleeding until March 8th  She states she had pelvic cramping and vaginal bleeding that required the use of a whole box of 52 super-absorbant tampons over a day and a half  Pt states she was changing her tampon every 30-45mins  She was seen and evaluated at [place] [address] on 3/9  At that time, she had a work-up that consisted of a CBC and TVUS among other labs (refer to documents in chart)  Hgb was 13 5 and pt's bleeding was improving  TVUS demonstrated an EMS of 4mm, otherwise unremarkable   A TVUS in 2/2018 performed at [facility] demonstrated an EMS of 4mm, but also a R anterior uterine body myoma  Pap smear in 5/2016 was WNL  Pt states bleeding resolved on 3/11/18  She denies any other symptoms  GEN: AA&Ox3, NAD  Pelvic Exam:   Normal appearing external genitalia  Bimanual exam demonstrated anteverted uterus, with TTP on deep palpation of suprapubic area, cystocele appreciated     No bleeding noted on speculum exam    A/P: 47yo P2 with PMB   EMB performed in office today  RTO in 2 weeks for results    Dr Rafia Carson was present for procedure    Sarthak Sprague MD  OBGYN, PGY-1  3/22/2018 2:11 PM

## 2018-04-05 ENCOUNTER — OFFICE VISIT (OUTPATIENT)
Dept: OBGYN CLINIC | Facility: HOSPITAL | Age: 48
End: 2018-04-05
Payer: COMMERCIAL

## 2018-04-05 VITALS
SYSTOLIC BLOOD PRESSURE: 144 MMHG | BODY MASS INDEX: 32.5 KG/M2 | HEART RATE: 76 BPM | DIASTOLIC BLOOD PRESSURE: 92 MMHG | WEIGHT: 183.4 LBS | HEIGHT: 63 IN

## 2018-04-05 DIAGNOSIS — Z12.31 ENCOUNTER FOR SCREENING MAMMOGRAM FOR BREAST CANCER: ICD-10-CM

## 2018-04-05 DIAGNOSIS — N95.0 POSTMENOPAUSAL BLEEDING: ICD-10-CM

## 2018-04-05 DIAGNOSIS — Z71.2 ENCOUNTER TO DISCUSS TEST RESULTS: Primary | ICD-10-CM

## 2018-04-05 PROCEDURE — 99213 OFFICE O/P EST LOW 20 MIN: CPT | Performed by: OBSTETRICS & GYNECOLOGY

## 2018-04-05 NOTE — PROGRESS NOTES
Assessment/Plan:      Diagnoses and all orders for this visit:    Encounter to discuss test results  Final Diagnosis   A  Endometrium, biopsy:  - Proliferative endometrium, negative for hyperplasia or atypia       - Pt has not had any further bleeding  - Reviewed causes for PMB and results of EMB; discussed, after review of case with Dr Pauly Gates on 4/4/18, that it was most likely one last period; However, Pt advised to be re-evaluated if continued bleeding as recurrent bleeding would not be considered normal  Encouraged weight loss as it is a modifiable risk factor  Counseled on healthier diet and exercise     Postmenopausal bleeding    Encounter for screening mammogram for breast cancer  -     Mammo screening bilateral w 3d & cad; Future    Health Maintenance  -Last pap smear with HPV co-testing negative 5/2016; due in 2021  -F/u in 1 year for annual exam        Subjective:     Patient ID: Javan George is a 50 y o  female  Pt is 51 yo female presents for discussion of test results  She is s/p EMB on 3/22 for c/o an episode of PMB 3/9-3/11/18  She became menopausal 8/2016  Pt states she has not had any more bleeding since that episode  Pt has no complaints this morning  Review of Systems   Genitourinary: Negative for pelvic pain and vaginal bleeding  Objective:  Vitals:    04/05/18 1353   BP: 144/92   Pulse: 76        Physical Exam   Constitutional: She is oriented to person, place, and time  She appears well-developed and well-nourished  No distress  HENT:   Head: Normocephalic and atraumatic  Neurological: She is alert and oriented to person, place, and time  Psychiatric: She has a normal mood and affect  Her behavior is normal    Vitals reviewed        Discussed case with Dr Pauly Gates on 4/4/18    Jaylen Ayala MD  OBGYN, PGY-1  4/5/2018 2:23 PM

## 2018-04-10 ENCOUNTER — HOSPITAL ENCOUNTER (OUTPATIENT)
Dept: RADIOLOGY | Age: 48
Discharge: HOME | End: 2018-04-10
Payer: COMMERCIAL

## 2018-04-10 DIAGNOSIS — Z12.31 ENCOUNTER FOR SCREENING MAMMOGRAM FOR BREAST CANCER: ICD-10-CM

## 2018-04-10 PROCEDURE — 77067 SCR MAMMO BI INCL CAD: CPT

## 2018-04-10 PROCEDURE — 77063 BREAST TOMOSYNTHESIS BI: CPT

## 2018-04-19 ENCOUNTER — OFFICE VISIT (OUTPATIENT)
Dept: FAMILY MEDICINE CLINIC | Facility: CLINIC | Age: 48
End: 2018-04-19
Payer: COMMERCIAL

## 2018-04-19 ENCOUNTER — TELEPHONE (OUTPATIENT)
Dept: FAMILY MEDICINE CLINIC | Facility: CLINIC | Age: 48
End: 2018-04-19

## 2018-04-19 VITALS
SYSTOLIC BLOOD PRESSURE: 138 MMHG | TEMPERATURE: 97 F | BODY MASS INDEX: 30.32 KG/M2 | WEIGHT: 182 LBS | RESPIRATION RATE: 16 BRPM | HEART RATE: 80 BPM | DIASTOLIC BLOOD PRESSURE: 80 MMHG | HEIGHT: 65 IN

## 2018-04-19 DIAGNOSIS — J06.9 UPPER RESPIRATORY TRACT INFECTION, UNSPECIFIED TYPE: ICD-10-CM

## 2018-04-19 DIAGNOSIS — J06.9 UPPER RESPIRATORY TRACT INFECTION, UNSPECIFIED TYPE: Primary | ICD-10-CM

## 2018-04-19 PROCEDURE — 99213 OFFICE O/P EST LOW 20 MIN: CPT | Performed by: FAMILY MEDICINE

## 2018-04-19 RX ORDER — GUAIFENESIN 600 MG
600 TABLET, EXTENDED RELEASE 12 HR ORAL EVERY 12 HOURS SCHEDULED
Qty: 20 TABLET | Refills: 0 | Status: SHIPPED | OUTPATIENT
Start: 2018-04-19 | End: 2018-04-19 | Stop reason: ALTCHOICE

## 2018-04-19 RX ORDER — FLUTICASONE PROPIONATE 50 MCG
2 SPRAY, SUSPENSION (ML) NASAL DAILY
Qty: 16 G | Refills: 0 | Status: SHIPPED | OUTPATIENT
Start: 2018-04-19 | End: 2018-07-20 | Stop reason: SDUPTHER

## 2018-04-19 RX ORDER — GUAIFENESIN 600 MG
600 TABLET, EXTENDED RELEASE 12 HR ORAL EVERY 12 HOURS SCHEDULED
Qty: 20 TABLET | Refills: 0 | Status: SHIPPED | OUTPATIENT
Start: 2018-04-19 | End: 2018-10-22

## 2018-04-19 NOTE — ASSESSMENT & PLAN NOTE
URI likely viral, supportive care with Flonase and Mucinex, home remedies    Return parameters discussed

## 2018-04-19 NOTE — PROGRESS NOTES
Assessment/Plan:    Upper respiratory tract infection  URI likely viral, supportive care with Flonase and Mucinex, home remedies  Return parameters discussed       Diagnoses and all orders for this visit:    Upper respiratory tract infection, unspecified type  -     fluticasone (VERAMYST) 27 5 MCG/SPRAY nasal spray; 2 sprays into each nostril daily  -     guaiFENesin (MUCINEX) 600 mg 12 hr tablet; Take 1 tablet (600 mg total) by mouth every 12 (twelve) hours     FU PRN     Subjective:      Patient ID: Jazz Marc is a 50 y o  female  URI    The current episode started yesterday  The problem has been gradually worsening  There has been no fever  Associated symptoms include coughing, rhinorrhea and a sore throat  She has tried nothing for the symptoms  The treatment provided no relief  The following portions of the patient's history were reviewed and updated as appropriate: allergies, current medications, past family history, past medical history, past social history, past surgical history and problem list     Review of Systems   Constitutional: Negative for chills and fever  HENT: Positive for rhinorrhea and sore throat  Respiratory: Positive for cough  Cardiovascular: Negative  Genitourinary: Negative  Musculoskeletal: Negative  Skin: Negative  Objective:      /80   Pulse 80   Temp (!) 97 °F (36 1 °C)   Resp 16   Ht 5' 4 6" (1 641 m)   Wt 82 6 kg (182 lb)   LMP 01/01/2016   BMI 30 66 kg/m²          Physical Exam   Constitutional: She appears well-developed  HENT:   PND, erythema, no exudates   Eyes: Conjunctivae are normal    Cardiovascular: Regular rhythm  Pulmonary/Chest: Effort normal and breath sounds normal  No respiratory distress  She has no wheezes  Lymphadenopathy:     She has no cervical adenopathy  Skin: Skin is warm  No rash noted

## 2018-04-25 ENCOUNTER — HOSPITAL ENCOUNTER (EMERGENCY)
Facility: HOSPITAL | Age: 48
Discharge: HOME | End: 2018-04-25
Admitting: EMERGENCY MEDICINE
Payer: COMMERCIAL

## 2018-04-25 ENCOUNTER — OFFICE VISIT (OUTPATIENT)
Dept: FAMILY MEDICINE CLINIC | Facility: CLINIC | Age: 48
End: 2018-04-25
Payer: COMMERCIAL

## 2018-04-25 ENCOUNTER — APPOINTMENT (EMERGENCY)
Dept: RADIOLOGY | Facility: HOSPITAL | Age: 48
End: 2018-04-25
Payer: COMMERCIAL

## 2018-04-25 VITALS
TEMPERATURE: 97.7 F | HEART RATE: 88 BPM | DIASTOLIC BLOOD PRESSURE: 96 MMHG | HEIGHT: 64 IN | BODY MASS INDEX: 30.56 KG/M2 | RESPIRATION RATE: 16 BRPM | SYSTOLIC BLOOD PRESSURE: 150 MMHG | WEIGHT: 179 LBS

## 2018-04-25 VITALS
TEMPERATURE: 97.5 F | RESPIRATION RATE: 20 BRPM | OXYGEN SATURATION: 100 % | HEART RATE: 69 BPM | SYSTOLIC BLOOD PRESSURE: 156 MMHG | BODY MASS INDEX: 30.44 KG/M2 | WEIGHT: 179 LBS | DIASTOLIC BLOOD PRESSURE: 84 MMHG

## 2018-04-25 DIAGNOSIS — R06.2 WHEEZING: Primary | ICD-10-CM

## 2018-04-25 DIAGNOSIS — J40 BRONCHITIS: Primary | ICD-10-CM

## 2018-04-25 DIAGNOSIS — R06.02 SHORTNESS OF BREATH: ICD-10-CM

## 2018-04-25 DIAGNOSIS — J40 BRONCHITIS: ICD-10-CM

## 2018-04-25 LAB
ALBUMIN SERPL BCP-MCNC: 3.9 G/DL (ref 3.5–5)
ALP SERPL-CCNC: 115 U/L (ref 46–116)
ALT SERPL W P-5'-P-CCNC: 14 U/L (ref 12–78)
ANION GAP SERPL CALCULATED.3IONS-SCNC: 7 MMOL/L (ref 4–13)
AST SERPL W P-5'-P-CCNC: 10 U/L (ref 5–45)
ATRIAL RATE: 71 BPM
BASOPHILS # BLD AUTO: 0.02 THOUSANDS/ΜL (ref 0–0.1)
BASOPHILS NFR BLD AUTO: 0 % (ref 0–1)
BILIRUB SERPL-MCNC: 0.32 MG/DL (ref 0.2–1)
BUN SERPL-MCNC: 16 MG/DL (ref 5–25)
CALCIUM SERPL-MCNC: 9.3 MG/DL (ref 8.3–10.1)
CHLORIDE SERPL-SCNC: 105 MMOL/L (ref 100–108)
CO2 SERPL-SCNC: 27 MMOL/L (ref 21–32)
CREAT SERPL-MCNC: 0.79 MG/DL (ref 0.6–1.3)
EOSINOPHIL # BLD AUTO: 0.19 THOUSAND/ΜL (ref 0–0.61)
EOSINOPHIL NFR BLD AUTO: 2 % (ref 0–6)
ERYTHROCYTE [DISTWIDTH] IN BLOOD BY AUTOMATED COUNT: 13.3 % (ref 11.6–15.1)
GFR SERPL CREATININE-BSD FRML MDRD: 89 ML/MIN/1.73SQ M
GLUCOSE SERPL-MCNC: 97 MG/DL (ref 65–140)
HCT VFR BLD AUTO: 40.9 % (ref 34.8–46.1)
HGB BLD-MCNC: 13.6 G/DL (ref 11.5–15.4)
LYMPHOCYTES # BLD AUTO: 2.88 THOUSANDS/ΜL (ref 0.6–4.47)
LYMPHOCYTES NFR BLD AUTO: 34 % (ref 14–44)
MCH RBC QN AUTO: 29.4 PG (ref 26.8–34.3)
MCHC RBC AUTO-ENTMCNC: 33.3 G/DL (ref 31.4–37.4)
MCV RBC AUTO: 88 FL (ref 82–98)
MONOCYTES # BLD AUTO: 0.37 THOUSAND/ΜL (ref 0.17–1.22)
MONOCYTES NFR BLD AUTO: 4 % (ref 4–12)
NEUTROPHILS # BLD AUTO: 5.02 THOUSANDS/ΜL (ref 1.85–7.62)
NEUTS SEG NFR BLD AUTO: 60 % (ref 43–75)
NRBC BLD AUTO-RTO: 0 /100 WBCS
P AXIS: 10 DEGREES
PLATELET # BLD AUTO: 349 THOUSANDS/UL (ref 149–390)
PMV BLD AUTO: 9 FL (ref 8.9–12.7)
POTASSIUM SERPL-SCNC: 3.9 MMOL/L (ref 3.5–5.3)
PR INTERVAL: 126 MS
PROT SERPL-MCNC: 8 G/DL (ref 6.4–8.2)
QRS AXIS: 46 DEGREES
QRSD INTERVAL: 82 MS
QT INTERVAL: 424 MS
QTC INTERVAL: 460 MS
RBC # BLD AUTO: 4.63 MILLION/UL (ref 3.81–5.12)
SODIUM SERPL-SCNC: 139 MMOL/L (ref 136–145)
T WAVE AXIS: 50 DEGREES
TROPONIN I SERPL-MCNC: <0.02 NG/ML
VENTRICULAR RATE: 71 BPM
WBC # BLD AUTO: 8.49 THOUSAND/UL (ref 4.31–10.16)

## 2018-04-25 PROCEDURE — 99285 EMERGENCY DEPT VISIT HI MDM: CPT

## 2018-04-25 PROCEDURE — 85025 COMPLETE CBC W/AUTO DIFF WBC: CPT

## 2018-04-25 PROCEDURE — 93010 ELECTROCARDIOGRAM REPORT: CPT | Performed by: INTERNAL MEDICINE

## 2018-04-25 PROCEDURE — 93005 ELECTROCARDIOGRAM TRACING: CPT

## 2018-04-25 PROCEDURE — 84484 ASSAY OF TROPONIN QUANT: CPT

## 2018-04-25 PROCEDURE — 99214 OFFICE O/P EST MOD 30 MIN: CPT | Performed by: FAMILY MEDICINE

## 2018-04-25 PROCEDURE — 94640 AIRWAY INHALATION TREATMENT: CPT

## 2018-04-25 PROCEDURE — 36415 COLL VENOUS BLD VENIPUNCTURE: CPT

## 2018-04-25 PROCEDURE — 80053 COMPREHEN METABOLIC PANEL: CPT

## 2018-04-25 PROCEDURE — 71046 X-RAY EXAM CHEST 2 VIEWS: CPT

## 2018-04-25 RX ORDER — PREDNISONE 20 MG/1
40 TABLET ORAL DAILY
Status: SHIPPED | OUTPATIENT
Start: 2018-04-26 | End: 2018-05-01

## 2018-04-25 RX ORDER — AZITHROMYCIN 250 MG/1
TABLET, FILM COATED ORAL
Qty: 6 TABLET | Refills: 0 | Status: SHIPPED | OUTPATIENT
Start: 2018-04-25 | End: 2018-04-29

## 2018-04-25 RX ORDER — ALBUTEROL SULFATE 90 UG/1
2 AEROSOL, METERED RESPIRATORY (INHALATION) EVERY 4 HOURS PRN
Status: DISCONTINUED | OUTPATIENT
Start: 2018-04-25 | End: 2018-11-07

## 2018-04-25 RX ORDER — ALBUTEROL SULFATE 2.5 MG/3ML
5 SOLUTION RESPIRATORY (INHALATION) ONCE
Status: COMPLETED | OUTPATIENT
Start: 2018-04-25 | End: 2018-04-25

## 2018-04-25 RX ADMIN — IPRATROPIUM BROMIDE 0.5 MG: 0.5 SOLUTION RESPIRATORY (INHALATION) at 14:08

## 2018-04-25 RX ADMIN — PREDNISONE 50 MG: 20 TABLET ORAL at 14:15

## 2018-04-25 RX ADMIN — ALBUTEROL SULFATE 5 MG: 2.5 SOLUTION RESPIRATORY (INHALATION) at 14:08

## 2018-04-25 NOTE — ED PROVIDER NOTES
History  Chief Complaint   Patient presents with    Shortness of Breath     Pt states that she was diagnosed with a URI last week and saw PCP today for increased SOB  Pt was given neb treatment with some relief and was send to ED for further evaluation and IV steroids     50year old female presents today complaining of cough for over a week  Cough is productive of green sputum  Was seen by her PCP a week ago and advised to take mucinex with no improvement  Seen again by her PCP today for wheezing and still no improvement after breathing treatment in office, so sent in for evaluation  Pt denies fevers but admits to chills, denies shortness of breath, chest pain or tightness, but does admit to wheezing which she has never had before  Lateral rib pain only with coughing  Denies medical history                 Prior to Admission Medications   Prescriptions Last Dose Informant Patient Reported? Taking?   fluticasone (FLONASE) 50 mcg/act nasal spray 2018 at Unknown time  No Yes   Si sprays into each nostril daily   guaiFENesin (MUCINEX) 600 mg 12 hr tablet 2018 at Unknown time  No Yes   Sig: Take 1 tablet (600 mg total) by mouth every 12 (twelve) hours      Facility-Administered Medications: None       Past Medical History:   Diagnosis Date    Discharge from right nipple     last assessed 2013    Hyperlipidemia        Past Surgical History:   Procedure Laterality Date    TUBAL LIGATION      WRIST SURGERY Bilateral        Family History   Problem Relation Age of Onset    Heart attack Mother     Diabetes Father     Hypertension Father     Hyperlipidemia Father     Breast cancer Maternal Grandmother      I have reviewed and agree with the history as documented  Social History   Substance Use Topics    Smoking status: Former Smoker    Smokeless tobacco: Former User    Alcohol use No        Review of Systems   Constitutional: Positive for chills     Respiratory: Positive for cough and wheezing  All other systems reviewed and are negative  Physical Exam  ED Triage Vitals [04/25/18 1147]   Temperature Pulse Respirations Blood Pressure SpO2   97 5 °F (36 4 °C) 87 20 (!) 196/96 97 %      Temp Source Heart Rate Source Patient Position - Orthostatic VS BP Location FiO2 (%)   Oral Monitor Sitting Left arm --      Pain Score       8           Orthostatic Vital Signs  Vitals:    04/25/18 1147 04/25/18 1411   BP: (!) 196/96 156/84   Pulse: 87 69   Patient Position - Orthostatic VS: Sitting Sitting       Physical Exam   Constitutional: She appears well-developed and well-nourished  No distress  HENT:   Head: Normocephalic and atraumatic  Mouth/Throat: Oropharynx is clear and moist  No oropharyngeal exudate  Eyes: Conjunctivae are normal  Pupils are equal, round, and reactive to light  Neck: Normal range of motion  Cardiovascular: Normal rate, regular rhythm and normal heart sounds  Pulmonary/Chest: Effort normal  No respiratory distress  She has wheezes  She has no rales  Slight end expiratory wheezes   Abdominal: Soft  She exhibits no distension  There is no tenderness  There is no guarding  Musculoskeletal: Normal range of motion  Neurological: She is alert  Skin: Skin is warm and dry  Capillary refill takes less than 2 seconds  She is not diaphoretic  Psychiatric: She has a normal mood and affect         ED Medications  Medications   albuterol inhalation solution 5 mg (5 mg Nebulization Given 4/25/18 1408)   ipratropium (ATROVENT) 0 02 % inhalation solution 0 5 mg (0 5 mg Nebulization Given 4/25/18 1408)   predniSONE tablet 50 mg (50 mg Oral Given 4/25/18 1415)       Diagnostic Studies  Results Reviewed     Procedure Component Value Units Date/Time    Comprehensive metabolic panel [[id number]] Collected:  04/25/18 1232    Lab Status:  Final result Specimen:  Blood from Arm, Left Updated:  04/25/18 1316     Sodium 139 mmol/L      Potassium 3 9 mmol/L      Chloride 105 mmol/L CO2 27 mmol/L      Anion Gap 7 mmol/L      BUN 16 mg/dL      Creatinine 0 79 mg/dL      Glucose 97 mg/dL      Calcium 9 3 mg/dL      AST 10 U/L      ALT 14 U/L      Alkaline Phosphatase 115 U/L      Total Protein 8 0 g/dL      Albumin 3 9 g/dL      Total Bilirubin 0 32 mg/dL      eGFR 89 ml/min/1 73sq m     Narrative:         National Kidney Disease Education Program recommendations are as follows:  GFR calculation is accurate only with a steady state creatinine  Chronic Kidney disease less than 60 ml/min/1 73 sq  meters  Kidney failure less than 15 ml/min/1 73 sq  meters  Troponin I [[id number]]  (Normal) Collected:  04/25/18 1232    Lab Status:  Final result Specimen:  Blood from Arm, Left Updated:  04/25/18 1259     Troponin I <0 02 ng/mL     Narrative:         [employer] Chemistry analyzer 99% cutoff is > 0 04 ng/mL in network labs    o cTnI 99% cutoff is useful only when applied to patients in the clinical setting of myocardial ischemia  o cTnI 99% cutoff should be interpreted in the context of clinical history, ECG findings and possibly cardiac imaging to establish correct diagnosis  o cTnI 99% cutoff may be suggestive but clearly not indicative of a coronary event without the clinical setting of myocardial ischemia      CBC and differential [[id number]]  (Normal) Collected:  04/25/18 1232    Lab Status:  Final result Specimen:  Blood from Arm, Left Updated:  04/25/18 1243     WBC 8 49 Thousand/uL      RBC 4 63 Million/uL      Hemoglobin 13 6 g/dL      Hematocrit 40 9 %      MCV 88 fL      MCH 29 4 pg      MCHC 33 3 g/dL      RDW 13 3 %      MPV 9 0 fL      Platelets 344 Thousands/uL      nRBC 0 /100 WBCs      Neutrophils Relative 60 %      Lymphocytes Relative 34 %      Monocytes Relative 4 %      Eosinophils Relative 2 %      Basophils Relative 0 %      Neutrophils Absolute 5 02 Thousands/µL      Lymphocytes Absolute 2 88 Thousands/µL      Monocytes Absolute 0 37 Thousand/µL      Eosinophils Absolute 0 19 Thousand/µL      Basophils Absolute 0 02 Thousands/µL                  X-ray chest 2 views   Final Result by Isabella Birmingham MD (04/25 6279)      No acute cardiopulmonary disease  Workstation performed: [id number]                    Procedures  Procedures       Phone Contacts  ED Phone Contact    ED Course                               MDM  Number of Diagnoses or Management Options  Bronchitis:   Diagnosis management comments: Pt feeling improvement after duoneb  Wheezes completely resolved  Denies SOB, no respiratory distress  Rx for albuterol MDI, JOSÉ MIGUEL violeta and 5 day prednisone course called into [facility] on [facility]  Pt advised to follow-up with PCP in 2 days or return if symptoms worsen  CritCare Time    Disposition  Final diagnoses:   Bronchitis     Time reflects when diagnosis was documented in both MDM as applicable and the Disposition within this note     Time User Action Codes Description Comment    4/25/2018  2:45 PM Young, [address] [place] [address] Bronchitis       ED Disposition     ED Disposition Condition Comment    Discharge  Maudry Silence discharge to home/self care  Condition at discharge: Good        Follow-up Information     Follow up With Specialties Details Why Contact Info    Dola Mcardle, MD Family Medicine Schedule an appointment as soon as possible for a visit in 2 days  [address] [facility] [address]  [phone]          Discharge Medication List as of 4/25/2018  2:45 PM      CONTINUE these medications which have NOT CHANGED    Details   fluticasone (FLONASE) 50 mcg/act nasal spray 2 sprays into each nostril daily, Starting Thu 4/19/2018, Normal      guaiFENesin (MUCINEX) 600 mg 12 hr tablet Take 1 tablet (600 mg total) by mouth every 12 (twelve) hours, Starting Thu 4/19/2018, Normal           No discharge procedures on file      ED Provider  Electronically Signed by           Khurram Wells PA-C  04/26/18 5768

## 2018-04-25 NOTE — PROGRESS NOTES
Shannan Bean 1970 female MRN: [id number]    Family Medicine Acute Visit    ASSESSMENT/PLAN   Problem List Items Addressed This Visit     None        1) Cough and shortness of breath - diffuse wheezing auscultated throughout all lung fields on exam today, pulse ox 94-98% with deep inspiration on RA  Given an albuterol treatment in office without improvement of symptoms, pulse ox unchanged; concern for bronchitis; will send albuterol inhaler, short course of steroids 40 mg for 5 days and Azithromycin with close follow up in 1 week  Continue with Flonase and supportive care  Discussed treatment plan with pt; given current symptoms and minimal improvement in office opts for going to ED for further evaluation  RTC in 1 week for follow up, sooner as needed  Future Appointments  Date Time Provider Susannah Nicholson   4/12/2019 2:40 PM [facility]          SUBJECTIVE  CC: Cold Like Symptoms      HPI:  Shannan Bean is a 50 y o  female who presents for over 1 week of cough, was seen in office 4/19/18 3-4 days of cough dx with URI likely viral no sick contacts, currently getting worse  C/o shortness of breath, productive cough, chills; difficulty with taking deep breath in,c/o chest tightness  MSK chest pain 2/2 coughing spells, urinary incontinence due to straining from cough  ROS as below  Review of Systems   Constitutional: Positive for appetite change, chills, diaphoresis, fatigue and unexpected weight change  Negative for fever  HENT: Positive for congestion, ear pain, postnasal drip, rhinorrhea, sinus pressure, sore throat and voice change  Negative for facial swelling, hearing loss, sneezing and trouble swallowing  Eyes: Negative for photophobia, pain, discharge, redness, itching and visual disturbance  Respiratory: Positive for cough, chest tightness, shortness of breath and wheezing  Gastrointestinal: Positive for constipation  Negative for diarrhea and nausea  Post tussive emesis   Genitourinary: Negative for difficulty urinating  Musculoskeletal: Positive for myalgias  Skin: Negative for rash  Neurological: Positive for headaches  Historical Information   The patient history was reviewed as follows:  Past Medical History:   Diagnosis Date    Discharge from right nipple     last assessed 03/20/2013    Hyperlipidemia          Past Surgical History:   Procedure Laterality Date    TUBAL LIGATION      WRIST SURGERY Bilateral      Family History   Problem Relation Age of Onset    Heart attack Mother     Diabetes Father     Hypertension Father     Hyperlipidemia Father     Breast cancer Maternal Grandmother       Social History   History   Alcohol Use    Yes     Comment: occasional     History   Drug Use No     History   Smoking Status    Former Smoker   Smokeless Tobacco    Former User       Medications:     Current Outpatient Prescriptions:     fluticasone (FLONASE) 50 mcg/act nasal spray, 2 sprays into each nostril daily, Disp: 16 g, Rfl: 0    guaiFENesin (MUCINEX) 600 mg 12 hr tablet, Take 1 tablet (600 mg total) by mouth every 12 (twelve) hours, Disp: 20 tablet, Rfl: 0    Allergies   Allergen Reactions    Pollen Extract Allergic Rhinitis and Sneezing       OBJECTIVE  Vitals:   Vitals:    04/25/18 0933   BP: 150/96   Pulse: 88   Resp: 16   Temp: 97 7 °F (36 5 °C)   Weight: 81 2 kg (179 lb)   Height: 5' 4 3" (1 633 m)         Physical Exam   Constitutional: She is oriented to person, place, and time  She appears well-developed and well-nourished  No distress  HENT:   Head: Normocephalic and atraumatic  Right Ear: External ear normal    Left Ear: External ear normal    Mouth/Throat: Oropharynx is clear and moist  No oropharyngeal exudate  Eyes: Conjunctivae and EOM are normal  Pupils are equal, round, and reactive to light  Right eye exhibits no discharge  Left eye exhibits no discharge  No scleral icterus     Neck: Normal range of motion  Cardiovascular: Normal rate, regular rhythm and normal heart sounds  No murmur heard  Pulmonary/Chest: Effort normal  No respiratory distress  She has wheezes  She exhibits tenderness  Abdominal: Soft  Bowel sounds are normal  She exhibits no distension  There is no tenderness  Musculoskeletal: She exhibits no edema  Neurological: She is alert and oriented to person, place, and time  No cranial nerve deficit  Skin: Skin is warm and dry  No rash noted  She is not diaphoretic  Psychiatric: She has a normal mood and affect  Vitals reviewed                   Ryan Lo MD  Geriatric Medicine Fellow  4/25/2018

## 2018-04-25 NOTE — PATIENT INSTRUCTIONS

## 2018-04-25 NOTE — DISCHARGE INSTRUCTIONS
Acute Bronchitis   WHAT YOU NEED TO KNOW:   Acute bronchitis is swelling and irritation in the air passages of your lungs  This irritation may cause you to cough or have other breathing problems  Acute bronchitis often starts because of another illness, such as a cold or the flu  The illness spreads from your nose and throat to your windpipe and airways  Bronchitis is often called a chest cold  Acute bronchitis lasts about 3 to 6 weeks and is usually not a serious illness  Your cough can last for several weeks  DISCHARGE INSTRUCTIONS:   Return to the emergency department if:   · You cough up blood  · Your lips or fingernails turn blue  · You feel like you are not getting enough air when you breathe  Contact your healthcare provider if:   · You have a fever  · Your breathing problems do not go away or get worse  · Your cough does not get better within 4 weeks  · You have questions or concerns about your condition or care  Self-care:   · Get more rest   Rest helps your body to heal  Slowly start to do more each day  Rest when you feel it is needed  · Avoid irritants in the air  Avoid chemicals, fumes, and dust  Wear a face mask if you must work around dust or fumes  Stay inside on days when air pollution levels are high  If you have allergies, stay inside when pollen counts are high  Do not use aerosol products, such as spray-on deodorant, bug spray, and hair spray  · Do not smoke or be around others who smoke  Nicotine and other chemicals in cigarettes and cigars damages the cilia that move mucus out of your lungs  Ask your healthcare provider for information if you currently smoke and need help to quit  E-cigarettes or smokeless tobacco still contain nicotine  Talk to your healthcare provider before you use these products  · Drink liquids as directed  Liquids help keep your air passages moist and help you cough up mucus   You may need to drink more liquids when you have acute bronchitis  Ask how much liquid to drink each day and which liquids are best for you  · Use a humidifier or vaporizer  Use a cool mist humidifier or a vaporizer to increase air moisture in your home  This may make it easier for you to breathe and help decrease your cough  Decrease risk for acute bronchitis:   · Get the vaccinations you need  Ask your healthcare provider if you should get vaccinated against the flu or pneumonia  · Prevent the spread of germs  You can decrease your risk of acute bronchitis and other illnesses by doing the following:     [facility] your hands often with soap and water  Carry germ-killing hand lotion or gel with you  You can use the lotion or gel to clean your hands when soap and water are not available  ¨ Do not touch your eyes, nose, or mouth unless you have washed your hands first     ¨ Always cover your mouth when you cough to prevent the spread of germs  It is best to cough into a tissue or your shirt sleeve instead of into your hand  Ask those around you cover their mouths when they cough  ¨ Try to avoid people who have a cold or the flu  If you are sick, stay away from others as much as possible  Medicines: Your healthcare provider may  give you any of the following:  · Ibuprofen or acetaminophen  are medicines that help lower your fever  They are available without a doctor's order  Ask your healthcare provider which medicine is right for you  Ask how much to take and how often to take it  Follow directions  These medicines can cause stomach bleeding if not taken correctly  Ibuprofen can cause kidney damage  Do not take ibuprofen if you have kidney disease, an ulcer, or allergies to aspirin  Acetaminophen can cause liver damage  Do not take more than 4,000 milligrams in 24 hours  · Decongestants  help loosen mucus in your lungs and make it easier to cough up  This can help you breathe easier  · Cough suppressants  decrease your urge to cough   If your cough produces mucus, do not take a cough suppressant unless your healthcare provider tells you to  Your healthcare provider may suggest that you take a cough suppressant at night so you can rest     · Inhalers  may be given  Your healthcare provider may give you one or more inhalers to help you breathe easier and cough less  An inhaler gives your medicine to open your airways  Ask your healthcare provider to show you how to use your inhaler correctly  · Take your medicine as directed  Contact your healthcare provider if you think your medicine is not helping or if you have side effects  Tell him of her if you are allergic to any medicine  Keep a list of the medicines, vitamins, and herbs you take  Include the amounts, and when and why you take them  Bring the list or the pill bottles to follow-up visits  Carry your medicine list with you in case of an emergency  Follow up with your healthcare provider as directed:  Write down questions you have so you will remember to ask them during your follow-up visits  © 2017 [employer] Information is for End User's use only and may not be sold, redistributed or otherwise used for commercial purposes  All illustrations and images included in CareNotes® are the copyrighted property of [employer]  or [employer]  The above information is an  only  It is not intended as medical advice for individual conditions or treatments  Talk to your doctor, nurse or pharmacist before following any medical regimen to see if it is safe and effective for you

## 2018-05-04 ENCOUNTER — OFFICE VISIT (OUTPATIENT)
Dept: FAMILY MEDICINE CLINIC | Facility: CLINIC | Age: 48
End: 2018-05-04
Payer: COMMERCIAL

## 2018-05-04 VITALS
SYSTOLIC BLOOD PRESSURE: 132 MMHG | WEIGHT: 179.6 LBS | DIASTOLIC BLOOD PRESSURE: 84 MMHG | BODY MASS INDEX: 30.66 KG/M2 | TEMPERATURE: 96.9 F | HEIGHT: 64 IN | RESPIRATION RATE: 16 BRPM | HEART RATE: 84 BPM

## 2018-05-04 DIAGNOSIS — J06.9 VIRAL UPPER RESPIRATORY TRACT INFECTION: Primary | ICD-10-CM

## 2018-05-04 DIAGNOSIS — J30.1 SEASONAL ALLERGIC RHINITIS DUE TO POLLEN: ICD-10-CM

## 2018-05-04 DIAGNOSIS — Z11.1 SCREENING FOR TUBERCULOSIS: ICD-10-CM

## 2018-05-04 PROBLEM — J30.2 SEASONAL ALLERGIC RHINITIS: Status: ACTIVE | Noted: 2018-05-04

## 2018-05-04 PROCEDURE — 86580 TB INTRADERMAL TEST: CPT | Performed by: FAMILY MEDICINE

## 2018-05-04 PROCEDURE — 99213 OFFICE O/P EST LOW 20 MIN: CPT | Performed by: FAMILY MEDICINE

## 2018-05-04 NOTE — ASSESSMENT & PLAN NOTE
Patient s/p treatment by PCP from 4/25 and subsequent ED visit  Significant symptomatic improvement with lingering cough  Continue supportive care  Anticipatory guidance including possibility of prolonged residual cough

## 2018-05-04 NOTE — ASSESSMENT & PLAN NOTE
Continue supportive care  Patient already taking Flonase daily  Counseled regarding possible escalation if needed including oral antihistamines & Neti Pot or similar

## 2018-05-04 NOTE — PATIENT INSTRUCTIONS
Allergic Rhinitis   AMBULATORY CARE:   Allergic rhinitis , or hay fever, is swelling of the inside of your nose  The swelling is a reaction to allergens in the air  An allergen can be anything that causes an allergic reaction  Allergies to weeds, grass, trees, or mold often cause seasonal allergic rhinitis  Indoor dust mites, cockroaches, pet dander, or mold can also cause allergic rhinitis  Common signs and symptoms include the following:   · Sneezing    · Nasal congestion    · Runny nose    · Itchy nose, eyes, or mouth    · Red, watery eyes    · Postnasal drip (nasal drainage down the back of your throat)    · Cough or frequent throat clearing    · Feeling tired or lethargic    · Dark circles under your eyes  Call 911 for the following:   · You have chest pain or shortness of breath  Seek care immediately if:   · You have severe pain  · You cough up blood  Contact your healthcare provider if:   · You have a fever  · You have ear or sinus pain, or a headache  · Your symptoms get worse, even after treatment  · You have yellow, green, brown, or bloody mucus coming from your nose  · Your nose is bleeding or you have pain inside your nose  · You have trouble sleeping because of your symptoms  · You have questions or concerns about your condition or care  Treatment:   · Antihistamines  help reduce itching, sneezing, and a runny nose  Some antihistamines can make you sleepy  · Nasal steroids  help decrease inflammation in your nose  · Decongestants  help clear your stuffy nose  · Immunotherapy  may be needed if your symptoms are severe or other treatments do not work  Immunotherapy is used to inject an allergen into your skin  At first, the therapy contains tiny amounts of the allergen  Your healthcare provider will slowly increase the amount of allergen  This may help your body be less sensitive to the allergen and stop reacting to it   You may need immunotherapy for weeks or longer  Manage allergic rhinitis:  The best way to manage allergic rhinitis is to avoid allergens that can trigger your symptoms  Any of the following may help decrease your symptoms:  · Rinse your nose and sinuses  with a salt water solution or use a salt water nasal spray  This will help thin the mucus in your nose and rinse away pollen and dirt  It will also help reduce swelling so you can breathe normally  Ask your healthcare provider how often to rinse your nose  · Reduce exposure to dust mites  Wash sheets and towels in hot water every week  Cover your pillows and mattresses with allergen-free covers  Limit the number of stuffed animals and soft toys your child has  Wash your child's toys in hot water regularly  Vacuum weekly and use a vacuum  with an air filter  If possible, get rid of carpets and curtains  These collect dust and dust mites  · Reduce exposure to pollen  Keep windows and doors closed in your house and car  Stay inside when air pollution or the pollen count is high  Run your air conditioner on recycle, and change air filters often  Shower and wash your hair before bed every night to rinse away pollen  · Reduce exposure to pet dander  If possible, do not keep cats, dogs, birds, or other pets  If you do keep pets in your home, keep them out of bedrooms and carpeted rooms  Bathe them often  · Reduce exposure to mold  Do not spend time in basements  Choose artificial plants instead of live plants  Keep your home's humidity at less than 45%  Do not have ponds or standing water in your home or yard  · Do not smoke  Avoid others who smoke  Ask your healthcare provider for information if you currently smoke and need help to quit  Follow up with your healthcare provider as directed:  Write down your questions so you remember to ask them during your visits     © 2017 [employer] Information is for End User's use only and may not be sold, redistributed or otherwise used for commercial purposes  All illustrations and images included in CareNotes® are the copyrighted property of [employer]  or [employer]  The above information is an  only  It is not intended as medical advice for individual conditions or treatments  Talk to your doctor, nurse or pharmacist before following any medical regimen to see if it is safe and effective for you

## 2018-05-04 NOTE — PROGRESS NOTES
Farooq Segal 1970 female MRN: [id number]    Family Medicine Follow-up Visit    SUBJECTIVE    CC: Follow-up    HPI:  Farooq Segal is a 50 y o  female who presented for a follow-up of URI    This is a new problem  The current episode started 1 to 4 weeks ago  The problem has been rapidly improving  There has been no fever  Associated symptoms include congestion, coughing, rhinorrhea and sneezing  Pertinent negatives include no abdominal pain, chest pain, diarrhea, dysuria, ear pain, headaches, nausea, plugged ear sensation, rash, sore throat, vomiting or wheezing  She has tried antihistamine, decongestant, inhaler use and NSAIDs for the symptoms  The treatment provided mild relief  Completed course of steroids, antibiotics, inhaler for bronchitis  Patient is taking Flonase for her allergies  Here PPD placement  Review of Systems   Constitutional: Negative for activity change, chills and fever  HENT: Positive for congestion, rhinorrhea and sneezing  Negative for ear pain and sore throat  Eyes: Negative for redness, itching and visual disturbance  Respiratory: Positive for cough  Negative for shortness of breath and wheezing  Cardiovascular: Negative for chest pain and palpitations  Gastrointestinal: Negative for abdominal pain, blood in stool, constipation, diarrhea, nausea and vomiting  Genitourinary: Negative for dysuria  Musculoskeletal: Negative for arthralgias and myalgias  Skin: Negative for rash  Allergic/Immunologic: Positive for environmental allergies  Neurological: Negative for dizziness, weakness and headaches  All other systems reviewed and are negative      Historical Information     The patient history was reviewed as follows:    Past Medical History:   Diagnosis Date    Discharge from right nipple     last assessed 03/20/2013    Hyperlipidemia         Social History   History   Alcohol Use No     History   Drug Use No     History   Smoking Status    Former Smoker   Smokeless Tobacco    Former User       Medications:   Meds/Allergies     Current Outpatient Prescriptions:     fluticasone (FLONASE) 50 mcg/act nasal spray, 2 sprays into each nostril daily, Disp: 16 g, Rfl: 0    PROAIR  (90 Base) MCG/ACT inhaler, Inhale 2 puffs every 6 (six) hours as needed, Disp: , Rfl: 0    guaiFENesin (MUCINEX) 600 mg 12 hr tablet, Take 1 tablet (600 mg total) by mouth every 12 (twelve) hours, Disp: 20 tablet, Rfl: 0    Current Facility-Administered Medications:     albuterol (PROVENTIL HFA,VENTOLIN HFA) inhaler 2 puff, 2 puff, Inhalation, Q4H PRN, Mary Carmen Godoy MD  Allergies   Allergen Reactions    Pollen Extract Allergic Rhinitis and Sneezing       OBJECTIVE    Vitals:   Vitals:    05/04/18 0821   BP: 132/84   Pulse: 84   Resp: 16   Temp: (!) 96 9 °F (36 1 °C)   Weight: 81 5 kg (179 lb 9 6 oz)   Height: 5' 4 3" (1 633 m)     Wt Readings from Last 3 Encounters:   05/04/18 81 5 kg (179 lb 9 6 oz)   04/25/18 81 2 kg (179 lb)   04/25/18 81 2 kg (179 lb)     Body mass index is 30 54 kg/m²  Temp Readings from Last 3 Encounters:   05/04/18 (!) 96 9 °F (36 1 °C)   04/25/18 97 5 °F (36 4 °C) (Oral)   04/25/18 97 7 °F (36 5 °C)     BP Readings from Last 3 Encounters:   05/04/18 132/84   04/25/18 156/84   04/25/18 150/96     Pulse Readings from Last 3 Encounters:   05/04/18 84   04/25/18 69   04/25/18 88     Patient's last menstrual period was 01/01/2016  Physical Exam:    Physical Exam   Constitutional: She appears well-developed and well-nourished  HENT:   Head: Normocephalic and atraumatic  Right Ear: Tympanic membrane and ear canal normal    Left Ear: Tympanic membrane and ear canal normal    Nose: Mucosal edema and rhinorrhea present  Mouth/Throat: Uvula is midline, oropharynx is clear and moist and mucous membranes are normal  No oropharyngeal exudate     Eyes: Conjunctivae and EOM are normal    Cardiovascular: Normal rate, regular rhythm, normal heart sounds and intact distal pulses  No murmur heard  Pulmonary/Chest: Effort normal and breath sounds normal  No respiratory distress  She has no decreased breath sounds  She has no rhonchi  She has no rales  Very occasional scant end expiratory wheeze  Abdominal: Soft  Bowel sounds are normal  She exhibits no distension  There is no tenderness  Neurological: She is alert  Skin: Skin is warm and dry  No rash noted  Nursing note and vitals reviewed  Labs: I have personally reviewed all pertinent results  Imaging:  I have personally reviewed all pertinent results  Assessment/Plan   Seasonal allergic rhinitis  Continue supportive care  Patient already taking Flonase daily  Counseled regarding possible escalation if needed including oral antihistamines & Neti Pot or similar    Upper respiratory tract infection  Patient s/p treatment by PCP from 4/25 and subsequent ED visit  Significant symptomatic improvement with lingering cough  Continue supportive care  Anticipatory guidance including possibility of prolonged residual cough    Amina was seen today for follow-up  Diagnoses and all orders for this visit:    Viral upper respiratory tract infection    Seasonal allergic rhinitis due to pollen    Screening for tuberculosis  -     TB Skin Test        - PCP: Monse Ocasio MD  - Follow-up appointments:     Future Appointments  Date Time Provider Susannah Pritchetti   5/7/2018 9:00 AM [address] BE [facility]   4/12/2019 2:40 PM BE [facility]MO SLN 1 BE [facility] [place]      _____________________________________________________________________   The attending physician, Dr Fletcher Gilliam, agreed with the plan      Foster Velasco MD, PGY-2  [facility]   5/4/2018

## 2018-05-07 ENCOUNTER — CLINICAL SUPPORT (OUTPATIENT)
Dept: FAMILY MEDICINE CLINIC | Facility: CLINIC | Age: 48
End: 2018-05-07

## 2018-05-07 DIAGNOSIS — Z11.1 SCREENING FOR TUBERCULOSIS: Primary | ICD-10-CM

## 2018-05-07 LAB
INDURATION: 0 MM
TB SKIN TEST: NEGATIVE

## 2018-05-14 ENCOUNTER — CLINICAL SUPPORT (OUTPATIENT)
Dept: FAMILY MEDICINE CLINIC | Facility: CLINIC | Age: 48
End: 2018-05-14
Payer: COMMERCIAL

## 2018-05-14 ENCOUNTER — TELEPHONE (OUTPATIENT)
Dept: FAMILY MEDICINE CLINIC | Facility: CLINIC | Age: 48
End: 2018-05-14

## 2018-05-14 DIAGNOSIS — Z72.0 TOBACCO ABUSE: ICD-10-CM

## 2018-05-14 DIAGNOSIS — F17.200 CURRENT EVERY DAY SMOKER: Primary | ICD-10-CM

## 2018-05-14 DIAGNOSIS — Z11.1 PPD SCREENING TEST: Primary | ICD-10-CM

## 2018-05-14 PROCEDURE — 86580 TB INTRADERMAL TEST: CPT | Performed by: FAMILY MEDICINE

## 2018-05-14 RX ORDER — VARENICLINE TARTRATE 25 MG
KIT ORAL
Qty: 53 TABLET | Refills: 0 | Status: SHIPPED | OUTPATIENT
Start: 2018-05-14 | End: 2018-06-11

## 2018-05-14 NOTE — TELEPHONE ENCOUNTER
Patient was seen in the office today for second PPD placement  Patient requesting medication to stop smoking

## 2018-05-14 NOTE — TELEPHONE ENCOUNTER
Called and LM that patient needs to let me know what she wanted Rx (nicotine patch or gum vs Chantix)    05/14/18

## 2018-05-14 NOTE — TELEPHONE ENCOUNTER
Pt returned call she would like to try the chantix however she is not sure if insurance will cover it    Pt uses [facility] on [facility] [address] [facility] [address] [facility]

## 2018-05-29 ENCOUNTER — CLINICAL SUPPORT (OUTPATIENT)
Dept: FAMILY MEDICINE CLINIC | Facility: CLINIC | Age: 48
End: 2018-05-29
Payer: COMMERCIAL

## 2018-05-29 DIAGNOSIS — Z11.1 PPD SCREENING TEST: Primary | ICD-10-CM

## 2018-05-29 PROCEDURE — 86580 TB INTRADERMAL TEST: CPT | Performed by: FAMILY MEDICINE

## 2018-05-31 ENCOUNTER — CLINICAL SUPPORT (OUTPATIENT)
Dept: FAMILY MEDICINE CLINIC | Facility: CLINIC | Age: 48
End: 2018-05-31

## 2018-05-31 DIAGNOSIS — Z11.1 PPD SCREENING TEST: Primary | ICD-10-CM

## 2018-05-31 LAB
INDURATION: 0 MM
TB SKIN TEST: NEGATIVE

## 2018-06-08 ENCOUNTER — TELEPHONE (OUTPATIENT)
Dept: FAMILY MEDICINE CLINIC | Facility: CLINIC | Age: 48
End: 2018-06-08

## 2018-06-08 DIAGNOSIS — Z71.6 ENCOUNTER FOR TOBACCO USE CESSATION COUNSELING: Primary | ICD-10-CM

## 2018-06-11 RX ORDER — VARENICLINE TARTRATE 1 MG/1
1 TABLET, FILM COATED ORAL 2 TIMES DAILY
Qty: 154 TABLET | Refills: 0 | Status: SHIPPED | OUTPATIENT
Start: 2018-06-11 | End: 2018-08-09 | Stop reason: SDUPTHER

## 2018-07-20 ENCOUNTER — OFFICE VISIT (OUTPATIENT)
Dept: FAMILY MEDICINE CLINIC | Facility: CLINIC | Age: 48
End: 2018-07-20
Payer: COMMERCIAL

## 2018-07-20 VITALS
SYSTOLIC BLOOD PRESSURE: 140 MMHG | HEART RATE: 84 BPM | DIASTOLIC BLOOD PRESSURE: 90 MMHG | TEMPERATURE: 97.6 F | BODY MASS INDEX: 32.44 KG/M2 | RESPIRATION RATE: 16 BRPM | HEIGHT: 64 IN | WEIGHT: 190 LBS

## 2018-07-20 DIAGNOSIS — J06.9 UPPER RESPIRATORY TRACT INFECTION, UNSPECIFIED TYPE: ICD-10-CM

## 2018-07-20 DIAGNOSIS — I10 ESSENTIAL HYPERTENSION: Primary | ICD-10-CM

## 2018-07-20 PROCEDURE — 99213 OFFICE O/P EST LOW 20 MIN: CPT | Performed by: FAMILY MEDICINE

## 2018-07-20 PROCEDURE — 3725F SCREEN DEPRESSION PERFORMED: CPT | Performed by: FAMILY MEDICINE

## 2018-07-20 RX ORDER — FLUTICASONE PROPIONATE 50 MCG
2 SPRAY, SUSPENSION (ML) NASAL DAILY
Qty: 16 G | Refills: 0 | Status: SHIPPED | OUTPATIENT
Start: 2018-07-20 | End: 2018-09-05 | Stop reason: SDUPTHER

## 2018-07-20 NOTE — PATIENT INSTRUCTIONS
Hypertension   AMBULATORY CARE:   Hypertension  is high blood pressure (BP)  Your BP is the force of your blood moving against the walls of your arteries  Normal BP is less than 120/80  Prehypertension is between 120/80 and 139/89  Hypertension is 140/90 or higher  Hypertension causes your BP to get so high that your heart has to work much harder than normal  This can damage your heart  You can control hypertension with a healthy lifestyle or medicines  A controlled blood pressure helps protect your organs, such as your heart, lungs, brain, and kidneys  Common symptoms include the following:   · Headache     · Blurred vision     · Chest pain     · Dizziness or weakness     · Trouble breathing    · Nosebleeds  Call 911 for any of the following:   · You have discomfort in your chest that feels like squeezing, pressure, fullness, or pain  · You become confused or have difficulty speaking  · You suddenly feel lightheaded or have trouble breathing  · You have pain or discomfort in your back, neck, jaw, stomach, or arm  Seek care immediately if:   · You have a severe headache or vision loss  · You have weakness in an arm or leg  Contact your healthcare provider if:   · You feel faint, dizzy, confused, or drowsy  · You have been taking your BP medicine and your BP is still higher than your healthcare provider says it should be  · You have questions or concerns about your condition or care  Treatment for hypertension  may include medicine to lower your BP and lower your cholesterol level  A low cholesterol level helps prevent heart disease and makes it easier to control your blood pressure  You may also need to make lifestyle changes  Take your medicine exactly as directed  Manage hypertension:  Talk with your healthcare provider about these and other ways to manage hypertension:  · Check your BP at home  Sit and rest for 5 minutes before you take your BP   Extend your arm and support it on a flat surface  Your arm should be at the same level as your heart  Follow the directions that came with your BP monitor  If possible, take at least 2 BP readings each time  Take your BP at least twice a day at the same times each day, such as morning and evening  Keep a record of your BP readings and bring it to your follow-up visits  Ask your healthcare provider what your BP should be  · Limit sodium (salt) as directed  Too much sodium can affect your fluid balance  Check labels to find low-sodium or no-salt-added foods  Some low-sodium foods use potassium salts for flavor  Too much potassium can also cause health problems  Your healthcare provider will tell you how much sodium and potassium are safe for you to have in a day  He or she may recommend that you limit sodium to 2,300 mg a day  · Follow the meal plan recommended by your healthcare provider  A dietitian or your provider can give you more information on low-sodium plans or the DASH (Dietary Approaches to Stop Hypertension) eating plan  The DASH plan is low in sodium, unhealthy fats, and total fat  It is high in potassium, calcium, and fiber  · Exercise to maintain a healthy weight  Exercise at least 30 minutes per day, on most days of the week  This will help decrease your blood pressure  Ask your healthcare provider about the best exercise plan for you  · Decrease stress  This may help lower your BP  Learn ways to relax, such as deep breathing or listening to music  · Limit alcohol  Women should limit alcohol to 1 drink a day  Men should limit alcohol to 2 drinks a day  A drink of alcohol is 12 ounces of beer, 5 ounces of wine, or 1½ ounces of liquor  · Do not smoke  Nicotine and other chemicals in cigarettes and cigars can increase your BP and also cause lung damage  Ask your healthcare provider for information if you currently smoke and need help to quit  E-cigarettes or smokeless tobacco still contain nicotine  Talk to your healthcare provider before you use these products  · Manage any other health conditions you have  Health conditions such as diabetes can increase your risk for hypertension  Follow your healthcare provider's instructions and take all your medicines as directed  Follow up with your healthcare provider as directed: You will need to return to have your BP checked and to have other lab tests done  Write down your questions so you remember to ask them during your visits  © 2017 [facility] Information is for End User's use only and may not be sold, redistributed or otherwise used for commercial purposes  All illustrations and images included in CareNotes® are the copyrighted property of A D A M , Inc  or [employer]  The above information is an  only  It is not intended as medical advice for individual conditions or treatments  Talk to your doctor, nurse or pharmacist before following any medical regimen to see if it is safe and effective for you

## 2018-07-20 NOTE — PROGRESS NOTES
Maria Solis 1970 female MRN: [id number]    Acute Visit    SUBJECTIVE    CC: Blood Pressure Check      HPI:  Maria Solis is a 50 y o  female who presented for an acute visit complaining of having a BP of 147/101  Her BP's have however been stable  Her SBP ( 150-132's), DBP ( IN THE 80'S) per our records  She also reports, headaches she gets about 3-5x per week, she describes as dull,bilateral, band-like and gets better when she takes tylenol  She felt dizzy 3 days ago that resolved spontaneously  Lasted about 5 secs  Denies CP, palpitations, SOB, weakness or numbness  HPI     Review of Systems   Constitutional: Positive for unexpected weight change (about 10lbs after quitting smoking)  Respiratory: Negative for cough, chest tightness and shortness of breath  Cardiovascular: Negative for chest pain, palpitations and leg swelling  Gastrointestinal: Negative for abdominal pain, constipation, diarrhea, nausea and vomiting  Genitourinary: Negative for dysuria, frequency and urgency  Musculoskeletal: Negative for arthralgias and myalgias  Neurological: Positive for dizziness (felt dizzy couple of days ago, lasted about 5 mins) and headaches (bliateral,band like, dull)  Negative for weakness and numbness         Historical Information   The patient history was reviewed as follows:  Past Medical History:   Diagnosis Date    Discharge from right nipple     last assessed 03/20/2013    Hyperlipidemia      Past Surgical History:   Procedure Laterality Date    TUBAL LIGATION      WRIST SURGERY Bilateral      Family History   Problem Relation Age of Onset    Heart attack Mother     Diabetes Father     Hypertension Father     Hyperlipidemia Father     Breast cancer Maternal Grandmother       Social History   History   Alcohol Use No     History   Drug Use No     History   Smoking Status    Former Smoker   Smokeless Tobacco    Former User       Medications:   Meds/Allergies   Current Outpatient Prescriptions   Medication Sig Dispense Refill    fluticasone (FLONASE) 50 mcg/act nasal spray 2 sprays into each nostril daily 16 g 0    PROAIR  (90 Base) MCG/ACT inhaler Inhale 2 puffs every 6 (six) hours as needed  0    varenicline (CHANTIX) 1 mg tablet Take 1 tablet (1 mg total) by mouth 2 (two) times a day for 77 days 154 tablet 0    guaiFENesin (MUCINEX) 600 mg 12 hr tablet Take 1 tablet (600 mg total) by mouth every 12 (twelve) hours 20 tablet 0     Current Facility-Administered Medications   Medication Dose Route Frequency Provider Last Rate Last Dose    albuterol (PROVENTIL HFA,VENTOLIN HFA) inhaler 2 puff  2 puff Inhalation Q4H PRN Sheron Marks MD           Allergies   Allergen Reactions    Bee Pollen      Other reaction(s): Allergic Rhinitis, Sneezing    Pollen Extract Allergic Rhinitis and Sneezing       OBJECTIVE    Vitals:   Vitals:    07/20/18 1512   BP: 140/90   Pulse: 84   Resp: 16   Temp: 97 6 °F (36 4 °C)   Weight: 86 2 kg (190 lb)   Height: 5' 4 3" (1 633 m)       Physical Exam   Constitutional: She appears well-developed and well-nourished  HENT:   Head: Normocephalic and atraumatic  Right Ear: External ear normal    Left Ear: External ear normal    Nose: Nose normal    Mouth/Throat: Oropharynx is clear and moist    Eyes: Conjunctivae and EOM are normal  Pupils are equal, round, and reactive to light  Cardiovascular: Normal rate, regular rhythm, normal heart sounds and intact distal pulses  Pulmonary/Chest: Effort normal and breath sounds normal  She has no wheezes  She has no rales  Abdominal: Soft  Bowel sounds are normal  There is no tenderness  Neurological: No cranial nerve deficit  Skin: Skin is warm and dry  Vitals reviewed  Lab:  I have personally reviewed all pertinent results     Clinical Support on 05/29/2018   Component Date Value Ref Range Status    TB Skin Test 05/31/2018 Negative   Final    Induration 05/31/2018 0  mm Final   Office Visit on 05/04/2018   Component Date Value Ref Range Status    TB Skin Test 05/07/2018 Negative   Final    Induration 05/07/2018 0  mm Final   Admission on 04/25/2018, Discharged on 04/25/2018   Component Date Value Ref Range Status    Sodium 04/25/2018 139  136 - 145 mmol/L Final    Potassium 04/25/2018 3 9  3 5 - 5 3 mmol/L Final    Chloride 04/25/2018 105  100 - 108 mmol/L Final    CO2 04/25/2018 27  21 - 32 mmol/L Final    Anion Gap 04/25/2018 7  4 - 13 mmol/L Final    BUN 04/25/2018 16  5 - 25 mg/dL Final    Creatinine 04/25/2018 0 79  0 60 - 1 30 mg/dL Final    Standardized to IDMS reference method    Glucose 04/25/2018 97  65 - 140 mg/dL Final      If the patient is fasting, the ADA then defines impaired fasting glucose as > 100 mg/dL and diabetes as > or equal to 123 mg/dL  Specimen collection should occur prior to Sulfasalazine administration due to the potential for falsely depressed results  Specimen collection should occur prior to Sulfapyridine administration due to the potential for falsely elevated results   Calcium 04/25/2018 9 3  8 3 - 10 1 mg/dL Final    AST 04/25/2018 10  5 - 45 U/L Final      Specimen collection should occur prior to Sulfasalazine administration due to the potential for falsely depressed results   ALT 04/25/2018 14  12 - 78 U/L Final      Specimen collection should occur prior to Sulfasalazine and/or Sulfapyridine administration due to the potential for falsely depressed results       Alkaline Phosphatase 04/25/2018 115  46 - 116 U/L Final    Total Protein 04/25/2018 8 0  6 4 - 8 2 g/dL Final    Albumin 04/25/2018 3 9  3 5 - 5 0 g/dL Final    Total Bilirubin 04/25/2018 0 32  0 20 - 1 00 mg/dL Final    eGFR 04/25/2018 89  ml/min/1 73sq m Final    WBC 04/25/2018 8 49  4 31 - 10 16 Thousand/uL Final    RBC 04/25/2018 4 63  3 81 - 5 12 Million/uL Final    Hemoglobin 04/25/2018 13 6  11 5 - 15 4 g/dL Final    Hematocrit 04/25/2018 40 9  34 8 - 46 1 % Final    MCV 04/25/2018 88  82 - 98 fL Final    MCH 04/25/2018 29 4  26 8 - 34 3 pg Final    MCHC 04/25/2018 33 3  31 4 - 37 4 g/dL Final    RDW 04/25/2018 13 3  11 6 - 15 1 % Final    MPV 04/25/2018 9 0  8 9 - 12 7 fL Final    Platelets 05/75/2807 349  149 - 390 Thousands/uL Final    nRBC 04/25/2018 0  /100 WBCs Final    Neutrophils Relative 04/25/2018 60  43 - 75 % Final    Lymphocytes Relative 04/25/2018 34  14 - 44 % Final    Monocytes Relative 04/25/2018 4  4 - 12 % Final    Eosinophils Relative 04/25/2018 2  0 - 6 % Final    Basophils Relative 04/25/2018 0  0 - 1 % Final    Neutrophils Absolute 04/25/2018 5 02  1 85 - 7 62 Thousands/µL Final    Lymphocytes Absolute 04/25/2018 2 88  0 60 - 4 47 Thousands/µL Final    Monocytes Absolute 04/25/2018 0 37  0 17 - 1 22 Thousand/µL Final    Eosinophils Absolute 04/25/2018 0 19  0 00 - 0 61 Thousand/µL Final    Basophils Absolute 04/25/2018 0 02  0 00 - 0 10 Thousands/µL Final    Ventricular Rate 04/25/2018 71  BPM Final    Atrial Rate 04/25/2018 71  BPM Final    NV Interval 04/25/2018 126  ms Final    QRSD Interval 04/25/2018 82  ms Final    QT Interval 04/25/2018 424  ms Final    QTC Interval 04/25/2018 460  ms Final    P Axis 04/25/2018 10  degrees Final    QRS Axis 04/25/2018 46  degrees Final    T Wave [place] 04/25/2018 50  degrees Final    Troponin I 04/25/2018 <0 02  <=0 04 ng/mL Final       Imaging:  I have personally reviewed all pertinent results  EKG, Pathology, and Other Studies:   I have personally reviewed all pertinent results  Assessment/Plan   Essential hypertension  Pt came in worried about her BP  's DBP in the 80's  Gained 10 lbs after she quit smoking in the last 2 months  - reassured  - counseled on exercise, diet, weight loss  - to f/u with CBC,CMP, Lipid Panel, HbA1c in 1month  William Garcia was seen today for blood pressure check      Diagnoses and all orders for this visit:    Essential hypertension  -     CBC and differential; Future  -     Comprehensive metabolic panel; Future  -     HEMOGLOBIN A1C W/ EAG ESTIMATION; Future  -     Lipid panel; Future    Upper respiratory tract infection, unspecified type  -     fluticasone (FLONASE) 50 mcg/act nasal spray; 2 sprays into each nostril daily        - PCP: Teagan Fleming MD  - Follow-up appointments: [facility]    Future Appointments  Date Time Provider Susannah Lyn   8/21/2018 2:00 PM Sg Acosta MD S [facility]   4/12/2019 2:40 PM BE MAMMO SLN 1 BE [facility] [place]      _____________________________________________________________________   The attending physician, Dr Akhtar, agreed with the plan  Sg Acosta MD, PGY-1  [facility]   7/20/2018        Please be aware that this note contains text that was dictated and there may be errors pertaining to "sound-alike "words during the dictation process

## 2018-07-20 NOTE — ASSESSMENT & PLAN NOTE
Pt came in worried about her BP  's DBP in the 80's  Gained 10 lbs after she quit smoking in the last 2 months  - reassured  - counseled on exercise, diet, weight loss  - to f/u with CBC,CMP, Lipid Panel, HbA1c in 1month

## 2018-07-31 ENCOUNTER — APPOINTMENT (OUTPATIENT)
Dept: LAB | Facility: HOSPITAL | Age: 48
End: 2018-07-31
Payer: COMMERCIAL

## 2018-07-31 DIAGNOSIS — I10 ESSENTIAL HYPERTENSION: ICD-10-CM

## 2018-07-31 LAB
ALBUMIN SERPL BCP-MCNC: 3.5 G/DL (ref 3.5–5)
ALP SERPL-CCNC: 101 U/L (ref 46–116)
ALT SERPL W P-5'-P-CCNC: 15 U/L (ref 12–78)
ANION GAP SERPL CALCULATED.3IONS-SCNC: 9 MMOL/L (ref 4–13)
AST SERPL W P-5'-P-CCNC: 9 U/L (ref 5–45)
BASOPHILS # BLD AUTO: 0.03 THOUSANDS/ΜL (ref 0–0.1)
BASOPHILS NFR BLD AUTO: 0 % (ref 0–1)
BILIRUB SERPL-MCNC: 0.33 MG/DL (ref 0.2–1)
BUN SERPL-MCNC: 16 MG/DL (ref 5–25)
CALCIUM SERPL-MCNC: 8.6 MG/DL (ref 8.3–10.1)
CHLORIDE SERPL-SCNC: 106 MMOL/L (ref 100–108)
CHOLEST SERPL-MCNC: 280 MG/DL (ref 50–200)
CO2 SERPL-SCNC: 26 MMOL/L (ref 21–32)
CREAT SERPL-MCNC: 0.62 MG/DL (ref 0.6–1.3)
EOSINOPHIL # BLD AUTO: 0.18 THOUSAND/ΜL (ref 0–0.61)
EOSINOPHIL NFR BLD AUTO: 2 % (ref 0–6)
ERYTHROCYTE [DISTWIDTH] IN BLOOD BY AUTOMATED COUNT: 13.2 % (ref 11.6–15.1)
EST. AVERAGE GLUCOSE BLD GHB EST-MCNC: 123 MG/DL
GFR SERPL CREATININE-BSD FRML MDRD: 107 ML/MIN/1.73SQ M
GLUCOSE P FAST SERPL-MCNC: 90 MG/DL (ref 65–99)
HBA1C MFR BLD: 5.9 % (ref 4.2–6.3)
HCT VFR BLD AUTO: 36.9 % (ref 34.8–46.1)
HDLC SERPL-MCNC: 43 MG/DL (ref 40–60)
HGB BLD-MCNC: 12.3 G/DL (ref 11.5–15.4)
IMM GRANULOCYTES # BLD AUTO: 0.02 THOUSAND/UL (ref 0–0.2)
IMM GRANULOCYTES NFR BLD AUTO: 0 % (ref 0–2)
LDLC SERPL CALC-MCNC: 214 MG/DL (ref 0–100)
LYMPHOCYTES # BLD AUTO: 2.27 THOUSANDS/ΜL (ref 0.6–4.47)
LYMPHOCYTES NFR BLD AUTO: 31 % (ref 14–44)
MCH RBC QN AUTO: 29.1 PG (ref 26.8–34.3)
MCHC RBC AUTO-ENTMCNC: 33.3 G/DL (ref 31.4–37.4)
MCV RBC AUTO: 87 FL (ref 82–98)
MONOCYTES # BLD AUTO: 0.58 THOUSAND/ΜL (ref 0.17–1.22)
MONOCYTES NFR BLD AUTO: 8 % (ref 4–12)
NEUTROPHILS # BLD AUTO: 4.33 THOUSANDS/ΜL (ref 1.85–7.62)
NEUTS SEG NFR BLD AUTO: 59 % (ref 43–75)
NONHDLC SERPL-MCNC: 237 MG/DL
NRBC BLD AUTO-RTO: 0 /100 WBCS
PLATELET # BLD AUTO: 275 THOUSANDS/UL (ref 149–390)
PMV BLD AUTO: 9.7 FL (ref 8.9–12.7)
POTASSIUM SERPL-SCNC: 3.8 MMOL/L (ref 3.5–5.3)
PROT SERPL-MCNC: 6.9 G/DL (ref 6.4–8.2)
RBC # BLD AUTO: 4.23 MILLION/UL (ref 3.81–5.12)
SODIUM SERPL-SCNC: 141 MMOL/L (ref 136–145)
TRIGL SERPL-MCNC: 113 MG/DL
WBC # BLD AUTO: 7.41 THOUSAND/UL (ref 4.31–10.16)

## 2018-07-31 PROCEDURE — 80053 COMPREHEN METABOLIC PANEL: CPT

## 2018-07-31 PROCEDURE — 80061 LIPID PANEL: CPT

## 2018-07-31 PROCEDURE — 85025 COMPLETE CBC W/AUTO DIFF WBC: CPT

## 2018-07-31 PROCEDURE — 83036 HEMOGLOBIN GLYCOSYLATED A1C: CPT

## 2018-07-31 PROCEDURE — 36415 COLL VENOUS BLD VENIPUNCTURE: CPT

## 2018-08-06 ENCOUNTER — OFFICE VISIT (OUTPATIENT)
Dept: FAMILY MEDICINE CLINIC | Facility: CLINIC | Age: 48
End: 2018-08-06
Payer: COMMERCIAL

## 2018-08-06 VITALS
SYSTOLIC BLOOD PRESSURE: 140 MMHG | DIASTOLIC BLOOD PRESSURE: 90 MMHG | RESPIRATION RATE: 16 BRPM | TEMPERATURE: 97.9 F | HEIGHT: 65 IN | BODY MASS INDEX: 32.06 KG/M2 | HEART RATE: 82 BPM | WEIGHT: 192.4 LBS

## 2018-08-06 DIAGNOSIS — Z72.0 TOBACCO ABUSE: ICD-10-CM

## 2018-08-06 DIAGNOSIS — E66.9 OBESITY (BMI 30-39.9): ICD-10-CM

## 2018-08-06 DIAGNOSIS — F17.200 CURRENT EVERY DAY SMOKER: ICD-10-CM

## 2018-08-06 DIAGNOSIS — E78.5 DYSLIPIDEMIA: ICD-10-CM

## 2018-08-06 DIAGNOSIS — R73.03 PREDIABETES: ICD-10-CM

## 2018-08-06 DIAGNOSIS — I10 ESSENTIAL HYPERTENSION: Primary | ICD-10-CM

## 2018-08-06 PROCEDURE — 99213 OFFICE O/P EST LOW 20 MIN: CPT | Performed by: FAMILY MEDICINE

## 2018-08-06 PROCEDURE — 3008F BODY MASS INDEX DOCD: CPT | Performed by: FAMILY MEDICINE

## 2018-08-06 RX ORDER — ATORVASTATIN CALCIUM 40 MG/1
40 TABLET, FILM COATED ORAL DAILY
Qty: 30 TABLET | Refills: 2 | Status: SHIPPED | OUTPATIENT
Start: 2018-08-06 | End: 2018-11-07 | Stop reason: SDUPTHER

## 2018-08-06 RX ORDER — HYDROCHLOROTHIAZIDE 12.5 MG/1
12.5 TABLET ORAL DAILY
Qty: 30 TABLET | Refills: 3 | Status: SHIPPED | OUTPATIENT
Start: 2018-08-06 | End: 2018-11-16 | Stop reason: SDUPTHER

## 2018-08-06 NOTE — ASSESSMENT & PLAN NOTE
Advised diet and lifestyle modification  Referral for nutritional counseling given    Will also check TSH with next set of blood tests

## 2018-08-06 NOTE — ASSESSMENT & PLAN NOTE
Fasting lipid panel done on 731 shows total cholesterol 280,   ASCVD risk 8 5%  Patient reports that she used to be on Lipitor 40 mg daily but stopped it because of lack of insurance, will restart Lipitor 40 mg daily  Lifestyle modification with diet and exercise    Will recheck lipid panel in 3 months

## 2018-08-06 NOTE — ASSESSMENT & PLAN NOTE
Patient on Chantix as advised, patient has not smoked for 2 months now  Continue Chantix for  4 more weeks and follow up  If patient continues to be smoke free for 12 weeks, will consider stopping it

## 2018-08-06 NOTE — PROGRESS NOTES
Assessment/Plan     Essential hypertension  Blood pressure 140/90, ASCVD risk 8 5%  Will start hydrochlorothiazide 12 5 mg daily  Follow-up in 4 weeks  Lifestyle modifications with DASH diet and exercise    Dyslipidemia  Fasting lipid panel done on 731 shows total cholesterol 280,   ASCVD risk 8 5%  Patient reports that she used to be on Lipitor 40 mg daily but stopped it because of lack of insurance, will restart Lipitor 40 mg daily  Lifestyle modification with diet and exercise  Will recheck lipid panel in 3 months     Obesity (BMI 30-39  9)  Advised diet and lifestyle modification  Referral for nutritional counseling given  Will also check TSH with next set of blood tests    Prediabetes  A1c 5 9  Lifestyle modifications, will recheck A1c in 1 year    Current every day smoker   Patient on Chantix as advised, patient has not smoked for 2 months now  Continue Chantix for  4 more weeks and follow up  If patient continues to be smoke free for 12 weeks, will consider stopping it  Diagnoses and all orders for this visit:    Essential hypertension  -     hydrochlorothiazide (HYDRODIURIL) 12 5 mg tablet; Take 1 tablet (12 5 mg total) by mouth daily  -     TSH, 3rd generation with T4 reflex; Future    Dyslipidemia  -     Ambulatory referral to Nutrition Services; Future  -     atorvastatin (LIPITOR) 40 mg tablet; Take 1 tablet (40 mg total) by mouth daily  -     Lipid Panel with Direct LDL reflex; Future    Obesity (BMI 30-39  9)    Prediabetes    Tobacco abuse    Current every day smoker       Follow-up with PCPin 4 weeks  Subjective     Chief Complaint   Patient presents with    Follow-up     B/P          70-year-old female presented to the office for follow-up visit    Patient was seen in office on 07/20/2018 and was told her blood pressure was high, she was advised lifestyle modifications and told to follow up in weeks but patient got concerned since her blood pressure continued to remain high at home, she stopped Chantix 2 days ago thinking that to be reason behind her high blood pressure  She wants to know if she can start blood pressure medication and states she cannot wait for 4 weeks, also she wants to discuss her blood test results  The following portions of the patient's history were reviewed and updated as appropriate: allergies, current medications, past family history, past medical history, past social history, past surgical history and problem list     Review of Systems   Constitutional: Negative for activity change, appetite change, fatigue and fever  HENT: Negative  Respiratory: Negative for cough, chest tightness, shortness of breath and wheezing  Cardiovascular: Negative for chest pain and palpitations  Gastrointestinal: Negative for abdominal distention, diarrhea, nausea and vomiting  Endocrine: Negative for cold intolerance  Genitourinary: Negative for difficulty urinating  Musculoskeletal: Negative for back pain and gait problem  Neurological: Negative for seizures, facial asymmetry and headaches  Hematological: Negative for adenopathy  Objective     Vitals:Blood pressure 140/90, pulse 82, temperature 97 9 °F (36 6 °C), resp  rate 16, height 5' 4 5" (1 638 m), weight 87 3 kg (192 lb 6 4 oz), last menstrual period 01/01/2016  Physical Exam:  Physical Exam   Constitutional: She is oriented to person, place, and time  She appears well-developed and well-nourished  HENT:   Head: Normocephalic and atraumatic  Mouth/Throat: Oropharynx is clear and moist    Eyes: Conjunctivae and EOM are normal    Neck: Normal range of motion  Neck supple  Cardiovascular: Normal rate, regular rhythm and normal heart sounds  Exam reveals no friction rub  No murmur heard  Pulmonary/Chest: Effort normal and breath sounds normal  No respiratory distress  She has no wheezes  She has no rales  Abdominal: Soft  Bowel sounds are normal  She exhibits no distension   There is no tenderness  Musculoskeletal: Normal range of motion  She exhibits no edema  Neurological: She is alert and oriented to person, place, and time  Skin: Skin is warm and dry  Vitals reviewed

## 2018-08-06 NOTE — ASSESSMENT & PLAN NOTE
Blood pressure 140/90, ASCVD risk 8 5%  Will start hydrochlorothiazide 12 5 mg daily  Follow-up in 4 weeks  Lifestyle modifications with DASH diet and exercise

## 2018-08-09 DIAGNOSIS — Z71.6 ENCOUNTER FOR TOBACCO USE CESSATION COUNSELING: ICD-10-CM

## 2018-08-09 RX ORDER — VARENICLINE TARTRATE 1 MG/1
1 TABLET, FILM COATED ORAL 2 TIMES DAILY
Qty: 154 TABLET | Refills: 0 | Status: SHIPPED | OUTPATIENT
Start: 2018-08-09 | End: 2018-11-01

## 2018-08-29 ENCOUNTER — TELEPHONE (OUTPATIENT)
Dept: FAMILY MEDICINE CLINIC | Facility: CLINIC | Age: 48
End: 2018-08-29

## 2018-08-29 NOTE — TELEPHONE ENCOUNTER
Patient called to report she saw on [place] [employer] and [employer] that the medication she has been taking, hydrochlorothiazide, has been recalled  She is concerned    Please call [phone]

## 2018-08-31 NOTE — TELEPHONE ENCOUNTER
What I can find is that it is only one lot of HCTZ that's been recalled  Patient should check with her pharmacy to see if that's the one she had and then if so, they can replace it with an alternate

## 2018-09-02 DIAGNOSIS — J06.9 UPPER RESPIRATORY TRACT INFECTION, UNSPECIFIED TYPE: ICD-10-CM

## 2018-09-02 RX ORDER — FLUTICASONE PROPIONATE 50 MCG
SPRAY, SUSPENSION (ML) NASAL
Refills: 0 | Status: CANCELLED | OUTPATIENT
Start: 2018-09-02

## 2018-09-05 DIAGNOSIS — J06.9 UPPER RESPIRATORY TRACT INFECTION, UNSPECIFIED TYPE: ICD-10-CM

## 2018-09-05 RX ORDER — FLUTICASONE PROPIONATE 50 MCG
2 SPRAY, SUSPENSION (ML) NASAL DAILY
Qty: 16 G | Refills: 0 | Status: SHIPPED | OUTPATIENT
Start: 2018-09-05 | End: 2019-04-09 | Stop reason: SDUPTHER

## 2018-10-09 DIAGNOSIS — J06.9 UPPER RESPIRATORY TRACT INFECTION, UNSPECIFIED TYPE: ICD-10-CM

## 2018-10-10 ENCOUNTER — OFFICE VISIT (OUTPATIENT)
Dept: FAMILY MEDICINE CLINIC | Facility: CLINIC | Age: 48
End: 2018-10-10
Payer: COMMERCIAL

## 2018-10-10 VITALS
HEART RATE: 78 BPM | RESPIRATION RATE: 14 BRPM | DIASTOLIC BLOOD PRESSURE: 70 MMHG | TEMPERATURE: 98.7 F | HEIGHT: 64 IN | SYSTOLIC BLOOD PRESSURE: 138 MMHG | WEIGHT: 189.6 LBS | BODY MASS INDEX: 32.37 KG/M2

## 2018-10-10 DIAGNOSIS — K58.2 IRRITABLE BOWEL SYNDROME WITH BOTH CONSTIPATION AND DIARRHEA: ICD-10-CM

## 2018-10-10 DIAGNOSIS — J30.2 SEASONAL ALLERGIC RHINITIS, UNSPECIFIED TRIGGER: Primary | ICD-10-CM

## 2018-10-10 DIAGNOSIS — H60.312 ACUTE DIFFUSE OTITIS EXTERNA OF LEFT EAR: ICD-10-CM

## 2018-10-10 DIAGNOSIS — I10 ESSENTIAL HYPERTENSION: ICD-10-CM

## 2018-10-10 PROBLEM — K58.9 IBS (IRRITABLE BOWEL SYNDROME): Status: ACTIVE | Noted: 2018-10-10

## 2018-10-10 PROCEDURE — 99214 OFFICE O/P EST MOD 30 MIN: CPT | Performed by: FAMILY MEDICINE

## 2018-10-10 RX ORDER — FLUTICASONE PROPIONATE 50 MCG
SPRAY, SUSPENSION (ML) NASAL
Refills: 0 | Status: CANCELLED | OUTPATIENT
Start: 2018-10-10

## 2018-10-10 RX ORDER — CIPROFLOXACIN AND DEXAMETHASONE 3; 1 MG/ML; MG/ML
4 SUSPENSION/ DROPS AURICULAR (OTIC) 2 TIMES DAILY
Qty: 7.5 ML | Refills: 0 | Status: SHIPPED | OUTPATIENT
Start: 2018-10-10 | End: 2018-11-01

## 2018-10-10 RX ORDER — CETIRIZINE HYDROCHLORIDE 10 MG/1
10 TABLET ORAL DAILY
Qty: 30 TABLET | Refills: 1 | Status: SHIPPED | OUTPATIENT
Start: 2018-10-10 | End: 2018-11-16 | Stop reason: ALTCHOICE

## 2018-10-10 NOTE — ASSESSMENT & PLAN NOTE
Stable   BP today 138/70  - Continue with current regimen Hydrochlorothiazide 12 5 mg daily  - Will continue to follow up

## 2018-10-10 NOTE — ASSESSMENT & PLAN NOTE
Patient comes to the office for pain in the left ear for the past 3 days    - Administer Otic drops: Ciprodex BID for 7 days  - Continue tylenol 500 mg for mild pain

## 2018-10-10 NOTE — ASSESSMENT & PLAN NOTE
Patient complains of chronic constipation and diarrhea, mild abdominal pain    - Patient was given a FODMAP diet to start with diet improvement  - Will follow up in 3 months to recheck and evaluate the need for medication

## 2018-10-10 NOTE — PROGRESS NOTES
Gerard Cardenas 1970 female MRN: [id number]    Family Medicine Acute Visit    ASSESSMENT/PLAN    Problem List Items Addressed This Visit     Seasonal allergic rhinitis - Primary     Patient is complaining of rhinorrhea, mild sinus pain and sore throat  - Continue Flonase   - Started Zyrtec 10 mg daily for 30 days         Relevant Medications    cetirizine (ZyrTEC) 10 mg tablet    Essential hypertension     Stable  BP today 138/70  - Continue with current regimen Hydrochlorothiazide 12 5 mg daily  - Will continue to follow up          Acute diffuse otitis externa of left ear     Patient comes to the office for pain in the left ear for the past 3 days    - Administer Otic drops: Ciprodex BID for 7 days  - Continue tylenol 500 mg for mild pain          Relevant Medications    ciprofloxacin-dexamethasone (CIPRODEX) otic suspension    IBS (irritable bowel syndrome)     Patient complains of chronic constipation and diarrhea, mild abdominal pain  - Patient was given a FODMAP diet to start with diet improvement  - Will follow up in 3 months to recheck and evaluate the need for medication                 Future Appointments  Date Time Provider Susannah Nicholson   4/12/2019 2:40 PM [facility]          SUBJECTIVE  CC: Earache      HPI:  Gerard Cardenas is a 50 y o  female with PMH of HTN, Dyslipidemia, obesity, allergic rhinitis, prediabetes, IBS, anxiety disorder who presents for pain in her left ear  She states that the pain started 3 days ago  She also complains of rhinorrhea, throat pain and sinus pain  She started Flonase with mild improvement of symptoms  Review of Systems   Constitutional: Negative for appetite change, diaphoresis, fatigue and fever  HENT: Positive for ear pain, rhinorrhea, sinus pain and sore throat  Negative for congestion  Respiratory: Negative for cough, chest tightness and shortness of breath      Cardiovascular: Negative for chest pain, palpitations and leg swelling  Gastrointestinal: Negative for abdominal distention, abdominal pain, constipation, diarrhea, nausea and vomiting  Genitourinary: Negative for difficulty urinating, frequency and urgency  Musculoskeletal: Negative for back pain and neck pain  Neurological: Negative for weakness, light-headedness, numbness and headaches  Psychiatric/Behavioral: Negative for agitation and behavioral problems  The patient is not nervous/anxious          Historical Information   The patient history was reviewed as follows:  Past Medical History:   Diagnosis Date    Discharge from right nipple     last assessed 03/20/2013    Hyperlipidemia          Past Surgical History:   Procedure Laterality Date    TUBAL LIGATION      WRIST SURGERY Bilateral      Family History   Problem Relation Age of Onset    Heart attack Mother     Diabetes Father     Hypertension Father     Hyperlipidemia Father     Breast cancer Maternal Grandmother       Social History   History   Alcohol Use No     History   Drug Use No     History   Smoking Status    Former Smoker   Smokeless Tobacco    Former User       Medications:     Current Outpatient Prescriptions:     atorvastatin (LIPITOR) 40 mg tablet, Take 1 tablet (40 mg total) by mouth daily, Disp: 30 tablet, Rfl: 2    cetirizine (ZyrTEC) 10 mg tablet, Take 1 tablet (10 mg total) by mouth daily, Disp: 30 tablet, Rfl: 1    CHANTIX 1 MG tablet, TAKE 1 TABLET (1 MG TOTAL) BY MOUTH 2 (TWO) TIMES A DAY FOR 77 DAYS, Disp: 154 tablet, Rfl: 0    ciprofloxacin-dexamethasone (CIPRODEX) otic suspension, Administer 4 drops into the left ear 2 (two) times a day, Disp: 7 5 mL, Rfl: 0    fluticasone (FLONASE) 50 mcg/act nasal spray, 2 sprays into each nostril daily, Disp: 16 g, Rfl: 0    guaiFENesin (MUCINEX) 600 mg 12 hr tablet, Take 1 tablet (600 mg total) by mouth every 12 (twelve) hours, Disp: 20 tablet, Rfl: 0    hydrochlorothiazide (HYDRODIURIL) 12 5 mg tablet, Take 1 tablet (12 5 mg total) by mouth daily, Disp: 30 tablet, Rfl: 3    PROAIR  (90 Base) MCG/ACT inhaler, Inhale 2 puffs every 6 (six) hours as needed, Disp: , Rfl: 0    Current Facility-Administered Medications:     albuterol (PROVENTIL HFA,VENTOLIN HFA) inhaler 2 puff, 2 puff, Inhalation, Q4H PRN, Stacy Mckenzie MD    Allergies   Allergen Reactions    Bee Pollen      Other reaction(s): Allergic Rhinitis, Sneezing    Pollen Extract Allergic Rhinitis and Sneezing       OBJECTIVE  Vitals:   Vitals:    10/10/18 1541   BP: 138/70   BP Location: Left arm   Patient Position: Sitting   Cuff Size: Large   Pulse: 78   Resp: 14   Temp: 98 7 °F (37 1 °C)   TempSrc: Tympanic   Weight: 86 kg (189 lb 9 6 oz)   Height: 5' 4 2" (1 631 m)         Physical Exam   Constitutional: She is oriented to person, place, and time  She appears well-developed and well-nourished  No distress  HENT:   Head: Normocephalic and atraumatic  Right Ear: Hearing, tympanic membrane, external ear and ear canal normal    Left Ear: There is tenderness  Ears:    Nose: Rhinorrhea present  Right sinus exhibits frontal sinus tenderness  Left sinus exhibits frontal sinus tenderness  Mouth/Throat: Posterior oropharyngeal erythema present  Neck: Normal range of motion  Neck supple  No thyromegaly present  Cardiovascular: Normal rate, regular rhythm, normal heart sounds and intact distal pulses  Exam reveals no gallop and no friction rub  No murmur heard  Pulmonary/Chest: Effort normal and breath sounds normal  No respiratory distress  She has no wheezes  She has no rales  She exhibits no tenderness  Abdominal: Soft  Bowel sounds are normal  She exhibits no distension and no mass  There is no tenderness  There is no rebound and no guarding  Musculoskeletal: Normal range of motion  She exhibits no edema, tenderness or deformity  Lymphadenopathy:     She has no cervical adenopathy  Neurological: She is alert and oriented to person, place, and time  Skin: Skin is warm and dry  No rash noted  She is not diaphoretic  No erythema  Psychiatric: She has a normal mood and affect  Her behavior is normal  Judgment and thought content normal    Vitals reviewed                   Soha Golden MD, PGY-1  [facility]   10/10/2018

## 2018-10-10 NOTE — ASSESSMENT & PLAN NOTE
Patient is complaining of rhinorrhea, mild sinus pain and sore throat    - Continue Flonase   - Started Zyrtec 10 mg daily for 30 days

## 2018-10-11 DIAGNOSIS — J06.9 UPPER RESPIRATORY TRACT INFECTION, UNSPECIFIED TYPE: ICD-10-CM

## 2018-10-11 RX ORDER — FLUTICASONE PROPIONATE 50 MCG
2 SPRAY, SUSPENSION (ML) NASAL DAILY
Qty: 16 G | Refills: 1 | Status: CANCELLED | OUTPATIENT
Start: 2018-10-11

## 2018-10-15 ENCOUNTER — OFFICE VISIT (OUTPATIENT)
Dept: FAMILY MEDICINE CLINIC | Facility: CLINIC | Age: 48
End: 2018-10-15
Payer: COMMERCIAL

## 2018-10-15 VITALS
RESPIRATION RATE: 18 BRPM | SYSTOLIC BLOOD PRESSURE: 124 MMHG | WEIGHT: 191 LBS | HEIGHT: 64 IN | DIASTOLIC BLOOD PRESSURE: 70 MMHG | HEART RATE: 74 BPM | TEMPERATURE: 97.6 F | BODY MASS INDEX: 32.61 KG/M2

## 2018-10-15 DIAGNOSIS — Z72.0 TOBACCO ABUSE: ICD-10-CM

## 2018-10-15 DIAGNOSIS — R53.83 OTHER FATIGUE: ICD-10-CM

## 2018-10-15 DIAGNOSIS — N93.9 ABNORMAL UTERINE BLEEDING (AUB): Primary | ICD-10-CM

## 2018-10-15 PROBLEM — H60.312 ACUTE DIFFUSE OTITIS EXTERNA OF LEFT EAR: Status: RESOLVED | Noted: 2018-10-10 | Resolved: 2018-10-15

## 2018-10-15 PROBLEM — J06.9 UPPER RESPIRATORY TRACT INFECTION: Status: RESOLVED | Noted: 2018-04-19 | Resolved: 2018-10-15

## 2018-10-15 PROBLEM — J30.2 SEASONAL ALLERGIC RHINITIS: Status: RESOLVED | Noted: 2018-05-04 | Resolved: 2018-10-15

## 2018-10-15 PROBLEM — F17.200 CURRENT EVERY DAY SMOKER: Status: RESOLVED | Noted: 2018-05-14 | Resolved: 2018-10-15

## 2018-10-15 PROBLEM — R10.9 FLANK PAIN: Status: RESOLVED | Noted: 2018-01-30 | Resolved: 2018-10-15

## 2018-10-15 PROBLEM — Z71.2 ENCOUNTER TO DISCUSS TEST RESULTS: Status: RESOLVED | Noted: 2018-04-05 | Resolved: 2018-10-15

## 2018-10-15 PROBLEM — N64.52 DISCHARGE FROM RIGHT NIPPLE: Status: RESOLVED | Noted: 2018-10-15 | Resolved: 2018-10-15

## 2018-10-15 PROBLEM — J40 BRONCHITIS: Status: RESOLVED | Noted: 2018-04-25 | Resolved: 2018-10-15

## 2018-10-15 PROCEDURE — 99213 OFFICE O/P EST LOW 20 MIN: CPT | Performed by: FAMILY MEDICINE

## 2018-10-15 NOTE — ASSESSMENT & PLAN NOTE
ddx mdd/ yariel vs hypothyroidism vs poor sleep hygiene vs other etiologies  Reminded pt to get TSH checked (ordered last month)  F/u in 1m    Cbc, bmp WNL 2m ago

## 2018-10-15 NOTE — ASSESSMENT & PLAN NOTE
LMP: 1/2016, spotting since yesterday  US pelvis 2/2018: Stable intramural myoma  No thickening or filling defects within the endometrium  Several small cystic foci along the endocervical mucosa  No discrete measurable lesion  Right anterior uterine body intramural myoma without suspicious change, 1 2 x 1 0 x 1 0cm, endometrium normal caliber of 4 mm, homogenous and normal in apperance  Endometrium biopsy 3/2018: Proliferative endometrium, negative for hyperplasia or atypia  GYN referral given today

## 2018-10-15 NOTE — PROGRESS NOTES
Suman Stanley 1970 female MRN: [id number]    Family Medicine Acute Visit    ASSESSMENT/PLAN   Problem List Items Addressed This Visit        Genitourinary    Abnormal uterine bleeding (AUB) - Primary     LMP: 2016, spotting since yesterday  US pelvis 2018: Stable intramural myoma  No thickening or filling defects within the endometrium  Several small cystic foci along the endocervical mucosa  No discrete measurable lesion  Right anterior uterine body intramural myoma without suspicious change, 1 2 x 1 0 x 1 0cm, endometrium normal caliber of 4 mm, homogenous and normal in apperance  Endometrium biopsy 3/2018: Proliferative endometrium, negative for hyperplasia or atypia  GYN referral given today  Relevant Orders    Ambulatory referral to Gynecology       Other    Tobacco abuse     Stopped 5 months ago  Denies depression or anxiety but admitted to feel like fatigue after stopping smoking  Will f/u in 1m           Other fatigue     ddx mdd/ yariel vs hypothyroidism vs poor sleep hygiene vs other etiologies  Reminded pt to get TSH checked (ordered last month)  F/u in 1m  Cbc, bmp WNL 2m ago                Future Appointments  Date Time Provider Susannah Lyn   10/22/2018 2:30 PM [facility]   2018 1:40 PM Noel Boyle MD [facility]   2019 2:40 PM BE [facility] 1 [facility]ammo [facility] [place]       SUBJECTIVE  CC: Menstrual Problem and Fatigue      HPI:  Suman Stanley is a 50 y o  female who presents for spotting  LMP 2 years ago, spotting since yesterday    Sexual active, no condom-use, denies itching, discharge, postcoidal bleeding, pareunia  A/w lower abd discomfort when having spotting  US pelvis and EMB -3/2018 which showed no malignancy  No other constitutional symptoms and signs  Nipple discharge 2 years ago, stopped completely and spontaneously a few months after that  No HA, blurry vision or weakness/tingling    Fatigue after stopping smoking 5 months ago, checkups negative, denies depressed mood or upset, no ST  Sleep interruptions recently, unsure the reason, denies anxiety  Review of Systems   Constitutional: Positive for fatigue  Negative for diaphoresis and fever  HENT: Negative for tinnitus and voice change  Eyes: Negative for photophobia and visual disturbance  Respiratory: Negative for shortness of breath and wheezing  Cardiovascular: Negative for chest pain and palpitations  Gastrointestinal: Positive for abdominal pain  Negative for blood in stool, constipation, diarrhea, nausea and vomiting  Genitourinary: Positive for vaginal bleeding  Negative for dyspareunia, dysuria, flank pain, hematuria, vaginal discharge and vaginal pain  Musculoskeletal: Negative for joint swelling and neck stiffness  Skin: Negative for pallor and rash  Neurological: Negative for dizziness, seizures, weakness, light-headedness and headaches  Psychiatric/Behavioral: Positive for sleep disturbance  Negative for dysphoric mood and suicidal ideas  The patient is not nervous/anxious          Historical Information   The patient history was reviewed as follows:  Past Medical History:   Diagnosis Date    Discharge from right nipple     last assessed 03/20/2013    Hyperlipidemia          Past Surgical History:   Procedure Laterality Date    TUBAL LIGATION      WRIST SURGERY Bilateral      Family History   Problem Relation Age of Onset    Heart attack Mother     Diabetes Father     Hypertension Father     Hyperlipidemia Father     Breast cancer Maternal Grandmother       Social History   History   Alcohol Use No     History   Drug Use No     History   Smoking Status    Former Smoker   Smokeless Tobacco    Former User       Medications:     Current Outpatient Prescriptions:     atorvastatin (LIPITOR) 40 mg tablet, Take 1 tablet (40 mg total) by mouth daily, Disp: 30 tablet, Rfl: 2    cetirizine (ZyrTEC) 10 mg tablet, Take 1 tablet (10 mg total) by mouth daily, Disp: 30 tablet, Rfl: 1    CHANTIX 1 MG tablet, TAKE 1 TABLET (1 MG TOTAL) BY MOUTH 2 (TWO) TIMES A DAY FOR 77 DAYS, Disp: 154 tablet, Rfl: 0    ciprofloxacin-dexamethasone (CIPRODEX) otic suspension, Administer 4 drops into the left ear 2 (two) times a day, Disp: 7 5 mL, Rfl: 0    fluticasone (FLONASE) 50 mcg/act nasal spray, 2 sprays into each nostril daily, Disp: 16 g, Rfl: 0    guaiFENesin (MUCINEX) 600 mg 12 hr tablet, Take 1 tablet (600 mg total) by mouth every 12 (twelve) hours, Disp: 20 tablet, Rfl: 0    hydrochlorothiazide (HYDRODIURIL) 12 5 mg tablet, Take 1 tablet (12 5 mg total) by mouth daily, Disp: 30 tablet, Rfl: 3    PROAIR  (90 Base) MCG/ACT inhaler, Inhale 2 puffs every 6 (six) hours as needed, Disp: , Rfl: 0    Current Facility-Administered Medications:     albuterol (PROVENTIL HFA,VENTOLIN HFA) inhaler 2 puff, 2 puff, Inhalation, Q4H PRN, Lore Elizabeth MD    Allergies   Allergen Reactions    Bee Pollen      Other reaction(s): Allergic Rhinitis, Sneezing    Pollen Extract Allergic Rhinitis and Sneezing       OBJECTIVE  Vitals:   Vitals:    10/15/18 1354   BP: 124/70   Pulse: 74   Resp: 18   Temp: 97 6 °F (36 4 °C)   Weight: 86 6 kg (191 lb)   Height: 5' 4" (1 626 m)         Physical Exam   Constitutional: She is oriented to person, place, and time  She appears well-developed and well-nourished  No distress  obese   HENT:   Head: Normocephalic and atraumatic  Right Ear: External ear normal    Left Ear: External ear normal    Nose: Nose normal    Mouth/Throat: No oropharyngeal exudate  Eyes: Pupils are equal, round, and reactive to light  Conjunctivae and EOM are normal  Right eye exhibits no discharge  Left eye exhibits no discharge  No scleral icterus  Neck: Normal range of motion  No thyromegaly present  Cardiovascular: Normal rate, regular rhythm and normal heart sounds  Exam reveals no friction rub      No murmur heard   Pulmonary/Chest: Effort normal and breath sounds normal  No respiratory distress  She has no rales  Abdominal: Soft  Bowel sounds are normal  She exhibits no distension  There is no tenderness  There is no rebound  Musculoskeletal: Normal range of motion  She exhibits no tenderness or deformity  Neurological: She is alert and oriented to person, place, and time  No cranial nerve deficit  She exhibits normal muscle tone  Skin: Skin is warm  No rash noted  She is not diaphoretic  No erythema  No pallor  Psychiatric: She has a normal mood and affect  Nursing note and vitals reviewed       Levi Victoria MD, PGY-2  [facility]   10/15/2018

## 2018-10-15 NOTE — ASSESSMENT & PLAN NOTE
Stopped 5 months ago  Denies depression or anxiety but admitted to feel like fatigue after stopping smoking    Will f/u in 1m

## 2018-10-22 ENCOUNTER — OFFICE VISIT (OUTPATIENT)
Dept: OBGYN CLINIC | Facility: HOSPITAL | Age: 48
End: 2018-10-22
Payer: COMMERCIAL

## 2018-10-22 VITALS
DIASTOLIC BLOOD PRESSURE: 72 MMHG | HEART RATE: 83 BPM | BODY MASS INDEX: 32.61 KG/M2 | HEIGHT: 64 IN | SYSTOLIC BLOOD PRESSURE: 127 MMHG | WEIGHT: 191 LBS

## 2018-10-22 DIAGNOSIS — N95.0 PMB (POSTMENOPAUSAL BLEEDING): Primary | ICD-10-CM

## 2018-10-22 LAB — SL AMB POCT URINE HCG: NORMAL

## 2018-10-22 PROCEDURE — 99214 OFFICE O/P EST MOD 30 MIN: CPT | Performed by: OBSTETRICS & GYNECOLOGY

## 2018-10-22 PROCEDURE — 88305 TISSUE EXAM BY PATHOLOGIST: CPT | Performed by: PATHOLOGY

## 2018-10-22 PROCEDURE — 81025 URINE PREGNANCY TEST: CPT | Performed by: OBSTETRICS & GYNECOLOGY

## 2018-10-22 NOTE — PROGRESS NOTES
Endometrial biopsy  Date/Time: 10/22/2018 12:26 PM  Performed by: Teresita Arellano by: Jane Herron     Consent:     Consent obtained:  Written    Consent given by:  Patient    Procedural risks discussed:  Repeat procedure and bleeding    Patient questions answered: yes      Patient agrees, verbalizes understanding, and wants to proceed: yes    Indication:     Indications: Post-menopausal bleeding      Chronicity of post-menopausal bleeding:  Recurrent  Procedure:     Procedure: endometrial biopsy with Pipelle      A bivalve speculum was placed in the vagina: yes      Cervix cleaned and prepped: yes      A paracervical block was performed: no      An intracervical block was performed: no      The cervix was dilated: yes      Uterus sounded: yes      Uterus sound depth (cm):  10    Specimen collected: specimen collected and sent to pathology      Patient tolerated procedure well with no complications: yes    Findings:     Uterus size:  Non-gravid    Cervix: normal    Comments:      Uterus was sounded to 10 cm  Uterus was deviated to the left  Adequate sample was obtained            Jasmin Jurado is a 51 y/o female who presents for post-menopausal bleeding  She had an EMB done in April 2018 for the same issue  Results showed proliferative endometrium and negative for hyperplasia or atypia  Bleeding spontaneously resolved at that time  One week ago, she had spotting that lasted four days and spontaneously resolved  She is sexually active with one partner, last sexual encounter was several weeks ago  Last pap smear was normal and done in 2016       Vitals:    10/22/18 1117   BP: 127/72   Pulse: 83     A/P Jasmin Jurado is a 50year old female who presents with recurrent post-menopausal bleeding      - Repeat EMB done today  - Will schedule D&C hysteroscopy  - Patient to return to clinic after [facility]  hyteroscopy    D/w Dr Leslie Hatchet, MD, PGY-1  10/22/2018  12:32 PM

## 2018-11-01 NOTE — PRE-PROCEDURE INSTRUCTIONS
Pre-Surgery Instructions:   Medication Instructions    atorvastatin (LIPITOR) 40 mg tablet Instructed patient per Anesthesia Guidelines   cetirizine (ZyrTEC) 10 mg tablet Instructed patient per Anesthesia Guidelines   fluticasone (FLONASE) 50 mcg/act nasal spray Instructed patient per Anesthesia Guidelines   hydrochlorothiazide (HYDRODIURIL) 12 5 mg tablet Instructed patient per Anesthesia Guidelines   PROAIR  (90 Base) MCG/ACT inhaler Instructed patient per Anesthesia Guidelines

## 2018-11-01 NOTE — PRE-PROCEDURE INSTRUCTIONS
Pre-Surgery Instructions:   Medication Instructions    atorvastatin (LIPITOR) 40 mg tablet Instructed patient per Anesthesia Guidelines   cetirizine (ZyrTEC) 10 mg tablet Instructed patient per Anesthesia Guidelines   fluticasone (FLONASE) 50 mcg/act nasal spray Instructed patient per Anesthesia Guidelines   hydrochlorothiazide (HYDRODIURIL) 12 5 mg tablet Instructed patient per Anesthesia Guidelines   PROAIR  (90 Base) MCG/ACT inhaler Instructed patient per Anesthesia Guidelines  Diuretic Med Class     Continue this medication up to the evening before surgery/procedure, but do not take the morning of the day of surgery  Inhalational Med Class     Continue to take these inhaler medications on your normal schedule up to and including the day of surgery  Statin Med Class     Continue to take this medication on your normal schedule  If this is an oral medication and you take it in the morning, then you may take this medicine with a sip of water

## 2018-11-07 ENCOUNTER — OFFICE VISIT (OUTPATIENT)
Dept: OBGYN CLINIC | Facility: HOSPITAL | Age: 48
End: 2018-11-07
Payer: COMMERCIAL

## 2018-11-07 VITALS
DIASTOLIC BLOOD PRESSURE: 73 MMHG | HEART RATE: 80 BPM | HEIGHT: 63 IN | SYSTOLIC BLOOD PRESSURE: 140 MMHG | BODY MASS INDEX: 34.55 KG/M2 | WEIGHT: 195 LBS

## 2018-11-07 DIAGNOSIS — E78.5 DYSLIPIDEMIA: ICD-10-CM

## 2018-11-07 DIAGNOSIS — N95.0 POSTMENOPAUSAL BLEEDING: Primary | ICD-10-CM

## 2018-11-07 PROBLEM — Z72.0 TOBACCO ABUSE: Status: RESOLVED | Noted: 2018-05-14 | Resolved: 2018-11-07

## 2018-11-07 PROCEDURE — 99213 OFFICE O/P EST LOW 20 MIN: CPT | Performed by: OBSTETRICS & GYNECOLOGY

## 2018-11-07 NOTE — PROGRESS NOTES
History & Physical - OB/GYN   Reggie Lunch 50 y o  female MRN: [id number]  Unit/Bed#:  Encounter: [id number]    HPI:  Ms Sandra Mayfield is a 50 y o  postmenopausal patient who presents for H&P prior to hysteroscopy and D&C for postmenopausal bleeding  She had previously underwent EMB for this, which was suggestive of endometrial polyp  Today she is without complaint  Notes cramping off and on, unchanged from her typical unfortunately  Denies fever, chills, chest pain, shortness of breath, nausea/vomiting, change in discharge or bleeding profile, dysuria, or leg pain  Active Problems:  Patient Active Problem List   Diagnosis    Postmenopausal bleeding    Loose stools    Essential hypertension    Dyslipidemia    Obesity (BMI 30-39  9)    Prediabetes    Anxiety disorder    IBS (irritable bowel syndrome)    Abnormal uterine bleeding (AUB)    Other fatigue       PMH:  Past Medical History:   Diagnosis Date    Discharge from right nipple     last assessed 03/20/2013    Hyperlipidemia     Hypertension        PSH:  Past Surgical History:   Procedure Laterality Date    CARPAL TUNNEL RELEASE      TUBAL LIGATION      WRIST SURGERY Bilateral        Social Hx:  Social History     Social History    Marital status: /Civil Union     Spouse name: N/A    Number of children: N/A    Years of education: N/A     Occupational History    Not on file       Social History Main Topics    Smoking status: Former Smoker    Smokeless tobacco: Former User      Comment: 2pk day, quit 2/2018    Alcohol use No    Drug use: No    Sexual activity: Yes     Partners: Male     Birth control/ protection: Post-menopausal     Other Topics Concern    Not on file     Social History Narrative    Current everyday smoker per [employer]       Meds:  Current Outpatient Prescriptions on File Prior to Visit   Medication Sig Dispense Refill    atorvastatin (LIPITOR) 40 mg tablet Take 1 tablet (40 mg total) by mouth daily 30 tablet 2    cetirizine (ZyrTEC) 10 mg tablet Take 1 tablet (10 mg total) by mouth daily 30 tablet 1    fluticasone (FLONASE) 50 mcg/act nasal spray 2 sprays into each nostril daily (Patient taking differently: 2 sprays into each nostril as needed  ) 16 g 0    hydrochlorothiazide (HYDRODIURIL) 12 5 mg tablet Take 1 tablet (12 5 mg total) by mouth daily 30 tablet 3    PROAIR  (90 Base) MCG/ACT inhaler Inhale 2 puffs every 6 (six) hours as needed  0     Current Facility-Administered Medications on File Prior to Visit   Medication Dose Route Frequency Provider Last Rate Last Dose    [DISCONTINUED] albuterol (PROVENTIL HFA,VENTOLIN HFA) inhaler 2 puff  2 puff Inhalation Q4H PRN Jose De Jesus Peters MD           Allergies: Allergies   Allergen Reactions    Bee Pollen      Other reaction(s): Allergic Rhinitis, Sneezing    Pollen Extract Allergic Rhinitis and Sneezing       Physical Exam:  /73   Pulse 80   Ht 5' 3" (1 6 m)   Wt 88 5 kg (195 lb)   LMP 01/01/2016   BMI 34 54 kg/m²     Physical Exam   Constitutional: She is oriented to person, place, and time  She appears well-developed and well-nourished  HENT:   Head: Normocephalic and atraumatic  Eyes: No scleral icterus  Cardiovascular: Normal rate, regular rhythm and normal heart sounds  Exam reveals no gallop and no friction rub  No murmur heard  Pulmonary/Chest: Effort normal and breath sounds normal  No respiratory distress  She has no wheezes  She has no rales  Abdominal: She exhibits no distension  There is no tenderness  There is no rebound and no guarding  Neurological: She is alert and oriented to person, place, and time  Skin: Skin is warm and dry  No rash noted  No erythema  No pallor  Psychiatric: She has a normal mood and affect  Her behavior is normal          Assessment:   50 y o  postmenopausal patient who presents for H&P prior to hysteroscopy and D&C for postmenopausal bleeding  Consents reviewed and signed today   Patient counseled on risk of procedure, including but not limited to bleeding, infection, injury to surrounding structures, uterine perforation, need for more extensive procedures, or occult pathology  Patient had the opportunity to ask questions, which were answered to her satisfaction  Informed consent was obtained  Plan:   1  Hysteroscopy, D&C as planned  2  NPO from midnight preop  3  No indication for antibiotics  4  Return to office in 2wks post-op for postop exam    Discussed and consented with Dr Lucas Wallace

## 2018-11-08 RX ORDER — ATORVASTATIN CALCIUM 40 MG/1
40 TABLET, FILM COATED ORAL DAILY
Qty: 30 TABLET | Refills: 5 | Status: SHIPPED | OUTPATIENT
Start: 2018-11-08 | End: 2019-06-09 | Stop reason: SDUPTHER

## 2018-11-16 ENCOUNTER — OFFICE VISIT (OUTPATIENT)
Dept: FAMILY MEDICINE CLINIC | Facility: CLINIC | Age: 48
End: 2018-11-16
Payer: COMMERCIAL

## 2018-11-16 VITALS
SYSTOLIC BLOOD PRESSURE: 130 MMHG | TEMPERATURE: 96.8 F | WEIGHT: 195 LBS | DIASTOLIC BLOOD PRESSURE: 80 MMHG | HEIGHT: 63 IN | RESPIRATION RATE: 16 BRPM | BODY MASS INDEX: 34.55 KG/M2 | HEART RATE: 82 BPM

## 2018-11-16 DIAGNOSIS — E78.5 DYSLIPIDEMIA: ICD-10-CM

## 2018-11-16 DIAGNOSIS — I10 ESSENTIAL HYPERTENSION: Primary | ICD-10-CM

## 2018-11-16 DIAGNOSIS — Z23 NEED FOR INFLUENZA VACCINATION: ICD-10-CM

## 2018-11-16 DIAGNOSIS — N95.0 POSTMENOPAUSAL BLEEDING: ICD-10-CM

## 2018-11-16 DIAGNOSIS — F17.200 SMOKING: ICD-10-CM

## 2018-11-16 DIAGNOSIS — J30.1 ALLERGIC RHINITIS DUE TO POLLEN, UNSPECIFIED SEASONALITY: ICD-10-CM

## 2018-11-16 DIAGNOSIS — H60.91 OTITIS EXTERNA OF RIGHT EAR, UNSPECIFIED CHRONICITY, UNSPECIFIED TYPE: ICD-10-CM

## 2018-11-16 PROBLEM — IMO0001 SMOKING: Status: ACTIVE | Noted: 2018-05-14

## 2018-11-16 PROBLEM — J30.9 ALLERGIC RHINITIS: Status: ACTIVE | Noted: 2018-05-04

## 2018-11-16 PROCEDURE — 90471 IMMUNIZATION ADMIN: CPT | Performed by: FAMILY MEDICINE

## 2018-11-16 PROCEDURE — 99213 OFFICE O/P EST LOW 20 MIN: CPT | Performed by: FAMILY MEDICINE

## 2018-11-16 PROCEDURE — 3079F DIAST BP 80-89 MM HG: CPT | Performed by: FAMILY MEDICINE

## 2018-11-16 PROCEDURE — 90686 IIV4 VACC NO PRSV 0.5 ML IM: CPT | Performed by: FAMILY MEDICINE

## 2018-11-16 PROCEDURE — 3075F SYST BP GE 130 - 139MM HG: CPT | Performed by: FAMILY MEDICINE

## 2018-11-16 RX ORDER — HYDROCHLOROTHIAZIDE 12.5 MG/1
12.5 TABLET ORAL DAILY
Qty: 90 TABLET | Refills: 3 | Status: SHIPPED | OUTPATIENT
Start: 2018-11-16 | End: 2019-12-10 | Stop reason: SDUPTHER

## 2018-11-16 RX ORDER — CIPROFLOXACIN AND DEXAMETHASONE 3; 1 MG/ML; MG/ML
4 SUSPENSION/ DROPS AURICULAR (OTIC) 2 TIMES DAILY
Qty: 7.5 ML | Refills: 0 | Status: SHIPPED | OUTPATIENT
Start: 2018-11-16 | End: 2019-04-26 | Stop reason: ALTCHOICE

## 2018-11-16 RX ORDER — VARENICLINE TARTRATE 25 MG
KIT ORAL 2 TIMES DAILY
COMMUNITY
End: 2019-05-28

## 2018-11-16 RX ORDER — SENNOSIDES 8.6 MG
650 CAPSULE ORAL EVERY 8 HOURS PRN
COMMUNITY
End: 2019-01-24 | Stop reason: SDUPTHER

## 2018-11-16 RX ORDER — FEXOFENADINE HYDROCHLORIDE 60 MG/1
60 TABLET, FILM COATED ORAL DAILY
Qty: 60 TABLET | Refills: 1 | Status: SHIPPED | OUTPATIENT
Start: 2018-11-16 | End: 2019-04-26 | Stop reason: ALTCHOICE

## 2018-11-16 NOTE — ASSESSMENT & PLAN NOTE
Fasting lipid panel in July showed cholesterol of 280, LDL of 214, atorvastatin 40 mg daily was started, will recheck lipid panel  Continue lifestyle modification with diet and exercise

## 2018-11-16 NOTE — ASSESSMENT & PLAN NOTE
Patient smoked 6 cigarettes in the last 6 months, relapses as soon as she stops Chantix, continue Chantix and will follow up    Will discontinue Chantix when she is smoke free for at least 12 weeks

## 2018-11-16 NOTE — PROGRESS NOTES
Assessment/Plan     Allergic rhinitis  Currently takes Flonase and Zyrtec 10 mg daily, reports sedation with Zyrtec will switch to Allegra 60 mg daily and have patient follow up      Essential hypertension  Within goal, will continue current regimen of hydrochlorothiazide 12 5 mg daily, patient to follow up in 3 months, continue DASH diet    Otitis externa of right ear  Start Ciprodex ear drops, 4 drops in right ear twice daily for 7 days    Postmenopausal bleeding  Scheduled for D&C in November, continue follow-up with gyn    Dyslipidemia  Fasting lipid panel in July showed cholesterol of 280, LDL of 214, atorvastatin 40 mg daily was started, will recheck lipid panel  Continue lifestyle modification with diet and exercise    Smoking  Patient smoked 6 cigarettes in the last 6 months, relapses as soon as she stops Chantix, continue Chantix and will follow up  Will discontinue Chantix when she is smoke free for at least 12 weeks    Diagnoses and all orders for this visit:    Essential hypertension  -     hydrochlorothiazide (HYDRODIURIL) 12 5 mg tablet; Take 1 tablet (12 5 mg total) by mouth daily    Need for influenza vaccination  -     SECOND LINE ([facility]): influenza vaccine, 3254-2050, quadrivalent, 0 5 mL, preservative-free (AFLURIA, FLUARIX, FLULAVAL, FLUZONE)    Allergic rhinitis due to pollen, unspecified seasonality  -     fexofenadine (ALLEGRA) 60 MG tablet; Take 1 tablet (60 mg total) by mouth daily    Postmenopausal bleeding    Dyslipidemia    Otitis externa of right ear, unspecified chronicity, unspecified type  -     ciprofloxacin-dexamethasone (CIPRODEX) otic suspension; Administer 4 drops to the right ear 2 (two) times a day for 7 days    Smoking    Other orders  -     acetaminophen (TYLENOL) 650 mg CR tablet; Take 650 mg by mouth every 8 (eight) hours as needed  -     varenicline (CHANTIX RAMESH) 0 5 MG X 11 & 1 MG X 42 tablet;  Take by mouth 2 (two) times a day Take one 0 5mg tab by mouth 1x daily for 3 days, then increase to one 0 5mg tab 2x daily for 3 days, then increase to one 1mg tab 2x daily         Subjective     Chief Complaint   Patient presents with    Follow-up       68-year-old female presented to office for follow-up visit, complaints of pain and blocked right ear, patient was recently treated for otitis externa in her left ear  She denies any fever, cough, ear discharge, tinnitus or any other symptoms with it   Patient has also been following up with gyn for postmenopausal bleeding and is scheduled for [facility]  in November  She has been taking hydrochlorothiazide regularly for her hypertension, is still taking Chantix for smoking cessation, reports that she smoked 6 cigarettes in the last 6 months, but is not ready to stop Chantix yet  The following portions of the patient's history were reviewed and updated as appropriate: allergies, current medications, past family history, past medical history, past social history, past surgical history and problem list     Review of Systems   Constitutional: Negative for chills and fever  HENT: Positive for ear pain  Negative for congestion and ear discharge  Eyes: Negative for discharge  Respiratory: Negative for choking, shortness of breath and wheezing  Cardiovascular: Negative for chest pain  Gastrointestinal: Negative for diarrhea, nausea and vomiting  Genitourinary: Negative for difficulty urinating  Allergic/Immunologic: Positive for environmental allergies  Neurological: Negative for headaches  Objective     Vitals:Blood pressure 130/80, pulse 82, temperature (!) 96 8 °F (36 °C), resp  rate 16, height 5' 3" (1 6 m), weight 88 5 kg (195 lb), last menstrual period 01/01/2016  Physical Exam:  Physical Exam   Constitutional: She is oriented to person, place, and time  She appears well-developed and well-nourished  HENT:   Head: Normocephalic and atraumatic     Left Ear: External ear normal    Mouth/Throat: Oropharynx is clear and moist  No oropharyngeal exudate  Right auditory canal is erythematous, tympanic membrane normal  Fluid behind left tympanic membrane, no signs of infection   Eyes: Conjunctivae and EOM are normal    Neck: Normal range of motion  Neck supple  Cardiovascular: Normal rate, regular rhythm and normal heart sounds  Exam reveals no friction rub  No murmur heard  Pulmonary/Chest: Effort normal and breath sounds normal  No respiratory distress  She has no wheezes  She has no rales  Abdominal: Soft  Bowel sounds are normal  She exhibits no distension  There is no tenderness  Musculoskeletal: Normal range of motion  Neurological: She is alert and oriented to person, place, and time  Skin: Skin is warm and dry  Vitals reviewed

## 2018-11-16 NOTE — ASSESSMENT & PLAN NOTE
Currently takes Flonase and Zyrtec 10 mg daily, reports sedation with Zyrtec will switch to Allegra 60 mg daily and have patient follow up

## 2018-11-16 NOTE — ASSESSMENT & PLAN NOTE
Within goal, will continue current regimen of hydrochlorothiazide 12 5 mg daily, patient to follow up in 3 months, continue DASH diet

## 2018-11-17 ENCOUNTER — APPOINTMENT (OUTPATIENT)
Dept: LAB | Facility: HOSPITAL | Age: 48
End: 2018-11-17
Payer: COMMERCIAL

## 2018-11-17 DIAGNOSIS — I10 ESSENTIAL HYPERTENSION: ICD-10-CM

## 2018-11-17 DIAGNOSIS — E78.5 DYSLIPIDEMIA: ICD-10-CM

## 2018-11-17 LAB
CHOLEST SERPL-MCNC: 177 MG/DL (ref 50–200)
HDLC SERPL-MCNC: 39 MG/DL (ref 40–60)
LDLC SERPL CALC-MCNC: 123 MG/DL (ref 0–100)
T4 FREE SERPL-MCNC: 0.85 NG/DL (ref 0.76–1.46)
TRIGL SERPL-MCNC: 75 MG/DL
TSH SERPL DL<=0.05 MIU/L-ACNC: 5.23 UIU/ML (ref 0.36–3.74)

## 2018-11-17 PROCEDURE — 84439 ASSAY OF FREE THYROXINE: CPT

## 2018-11-17 PROCEDURE — 80061 LIPID PANEL: CPT

## 2018-11-17 PROCEDURE — 36415 COLL VENOUS BLD VENIPUNCTURE: CPT

## 2018-11-17 PROCEDURE — 84443 ASSAY THYROID STIM HORMONE: CPT

## 2018-11-19 ENCOUNTER — ANESTHESIA EVENT (OUTPATIENT)
Dept: PERIOP | Facility: HOSPITAL | Age: 48
End: 2018-11-19
Payer: COMMERCIAL

## 2018-11-20 ENCOUNTER — HOSPITAL ENCOUNTER (OUTPATIENT)
Facility: HOSPITAL | Age: 48
Setting detail: OUTPATIENT SURGERY
Discharge: HOME | End: 2018-11-20
Attending: OBSTETRICS & GYNECOLOGY | Admitting: OBSTETRICS & GYNECOLOGY
Payer: COMMERCIAL

## 2018-11-20 ENCOUNTER — ANESTHESIA (OUTPATIENT)
Dept: PERIOP | Facility: HOSPITAL | Age: 48
End: 2018-11-20
Payer: COMMERCIAL

## 2018-11-20 VITALS
TEMPERATURE: 98.4 F | OXYGEN SATURATION: 97 % | WEIGHT: 195 LBS | HEIGHT: 63 IN | RESPIRATION RATE: 16 BRPM | SYSTOLIC BLOOD PRESSURE: 149 MMHG | HEART RATE: 80 BPM | DIASTOLIC BLOOD PRESSURE: 87 MMHG | BODY MASS INDEX: 34.55 KG/M2

## 2018-11-20 DIAGNOSIS — N95.0 POSTMENOPAUSAL BLEEDING: ICD-10-CM

## 2018-11-20 DIAGNOSIS — G89.18 POSTOPERATIVE PAIN: Primary | ICD-10-CM

## 2018-11-20 LAB
ABO GROUP BLD: NORMAL
BLD GP AB SCN SERPL QL: NEGATIVE
RH BLD: POSITIVE
SPECIMEN EXPIRATION DATE: NORMAL

## 2018-11-20 PROCEDURE — 88305 TISSUE EXAM BY PATHOLOGIST: CPT | Performed by: PATHOLOGY

## 2018-11-20 PROCEDURE — 86900 BLOOD TYPING SEROLOGIC ABO: CPT | Performed by: OBSTETRICS & GYNECOLOGY

## 2018-11-20 PROCEDURE — 86901 BLOOD TYPING SEROLOGIC RH(D): CPT | Performed by: OBSTETRICS & GYNECOLOGY

## 2018-11-20 PROCEDURE — 58558 HYSTEROSCOPY BIOPSY: CPT | Performed by: OBSTETRICS & GYNECOLOGY

## 2018-11-20 PROCEDURE — 86850 RBC ANTIBODY SCREEN: CPT | Performed by: OBSTETRICS & GYNECOLOGY

## 2018-11-20 RX ORDER — HYDROMORPHONE HCL/PF 1 MG/ML
0.4 SYRINGE (ML) INJECTION
Status: DISCONTINUED | OUTPATIENT
Start: 2018-11-20 | End: 2018-11-20 | Stop reason: HOSPADM

## 2018-11-20 RX ORDER — FENTANYL CITRATE 50 UG/ML
INJECTION, SOLUTION INTRAMUSCULAR; INTRAVENOUS AS NEEDED
Status: DISCONTINUED | OUTPATIENT
Start: 2018-11-20 | End: 2018-11-20 | Stop reason: SURG

## 2018-11-20 RX ORDER — MIDAZOLAM HYDROCHLORIDE 1 MG/ML
INJECTION INTRAMUSCULAR; INTRAVENOUS AS NEEDED
Status: DISCONTINUED | OUTPATIENT
Start: 2018-11-20 | End: 2018-11-20 | Stop reason: SURG

## 2018-11-20 RX ORDER — FENTANYL CITRATE/PF 50 MCG/ML
25 SYRINGE (ML) INJECTION
Status: DISCONTINUED | OUTPATIENT
Start: 2018-11-20 | End: 2018-11-20 | Stop reason: HOSPADM

## 2018-11-20 RX ORDER — SODIUM CHLORIDE, SODIUM LACTATE, POTASSIUM CHLORIDE, CALCIUM CHLORIDE 600; 310; 30; 20 MG/100ML; MG/100ML; MG/100ML; MG/100ML
75 INJECTION, SOLUTION INTRAVENOUS CONTINUOUS
Status: DISCONTINUED | OUTPATIENT
Start: 2018-11-20 | End: 2018-11-20 | Stop reason: HOSPADM

## 2018-11-20 RX ORDER — LIDOCAINE HYDROCHLORIDE 10 MG/ML
INJECTION, SOLUTION INFILTRATION; PERINEURAL AS NEEDED
Status: DISCONTINUED | OUTPATIENT
Start: 2018-11-20 | End: 2018-11-20 | Stop reason: HOSPADM

## 2018-11-20 RX ORDER — SODIUM CHLORIDE, SODIUM LACTATE, POTASSIUM CHLORIDE, CALCIUM CHLORIDE 600; 310; 30; 20 MG/100ML; MG/100ML; MG/100ML; MG/100ML
125 INJECTION, SOLUTION INTRAVENOUS CONTINUOUS
Status: DISCONTINUED | OUTPATIENT
Start: 2018-11-20 | End: 2018-11-20 | Stop reason: HOSPADM

## 2018-11-20 RX ORDER — LIDOCAINE HYDROCHLORIDE 10 MG/ML
INJECTION, SOLUTION INFILTRATION; PERINEURAL AS NEEDED
Status: DISCONTINUED | OUTPATIENT
Start: 2018-11-20 | End: 2018-11-20 | Stop reason: SURG

## 2018-11-20 RX ORDER — OXYCODONE HYDROCHLORIDE AND ACETAMINOPHEN 5; 325 MG/1; MG/1
2 TABLET ORAL EVERY 4 HOURS PRN
Status: DISCONTINUED | OUTPATIENT
Start: 2018-11-20 | End: 2018-11-20 | Stop reason: HOSPADM

## 2018-11-20 RX ORDER — PROMETHAZINE HYDROCHLORIDE 25 MG/ML
25 INJECTION, SOLUTION INTRAMUSCULAR; INTRAVENOUS ONCE AS NEEDED
Status: DISCONTINUED | OUTPATIENT
Start: 2018-11-20 | End: 2018-11-20 | Stop reason: HOSPADM

## 2018-11-20 RX ORDER — MEPERIDINE HYDROCHLORIDE 25 MG/ML
12.5 INJECTION INTRAMUSCULAR; INTRAVENOUS; SUBCUTANEOUS
Status: DISCONTINUED | OUTPATIENT
Start: 2018-11-20 | End: 2018-11-20 | Stop reason: HOSPADM

## 2018-11-20 RX ORDER — ONDANSETRON 2 MG/ML
4 INJECTION INTRAMUSCULAR; INTRAVENOUS ONCE AS NEEDED
Status: DISCONTINUED | OUTPATIENT
Start: 2018-11-20 | End: 2018-11-20 | Stop reason: HOSPADM

## 2018-11-20 RX ORDER — METOCLOPRAMIDE HYDROCHLORIDE 5 MG/ML
INJECTION INTRAMUSCULAR; INTRAVENOUS AS NEEDED
Status: DISCONTINUED | OUTPATIENT
Start: 2018-11-20 | End: 2018-11-20 | Stop reason: SURG

## 2018-11-20 RX ORDER — PROPOFOL 10 MG/ML
INJECTION, EMULSION INTRAVENOUS AS NEEDED
Status: DISCONTINUED | OUTPATIENT
Start: 2018-11-20 | End: 2018-11-20 | Stop reason: SURG

## 2018-11-20 RX ORDER — ONDANSETRON 2 MG/ML
INJECTION INTRAMUSCULAR; INTRAVENOUS AS NEEDED
Status: DISCONTINUED | OUTPATIENT
Start: 2018-11-20 | End: 2018-11-20 | Stop reason: SURG

## 2018-11-20 RX ORDER — SODIUM CHLORIDE, SODIUM LACTATE, POTASSIUM CHLORIDE, CALCIUM CHLORIDE 600; 310; 30; 20 MG/100ML; MG/100ML; MG/100ML; MG/100ML
20 INJECTION, SOLUTION INTRAVENOUS CONTINUOUS
Status: DISCONTINUED | OUTPATIENT
Start: 2018-11-20 | End: 2018-11-20

## 2018-11-20 RX ORDER — KETOROLAC TROMETHAMINE 30 MG/ML
INJECTION, SOLUTION INTRAMUSCULAR; INTRAVENOUS AS NEEDED
Status: DISCONTINUED | OUTPATIENT
Start: 2018-11-20 | End: 2018-11-20 | Stop reason: SURG

## 2018-11-20 RX ORDER — IBUPROFEN 400 MG/1
600 TABLET ORAL EVERY 6 HOURS PRN
Status: DISCONTINUED | OUTPATIENT
Start: 2018-11-20 | End: 2018-11-20 | Stop reason: HOSPADM

## 2018-11-20 RX ORDER — ONDANSETRON 2 MG/ML
4 INJECTION INTRAMUSCULAR; INTRAVENOUS EVERY 6 HOURS PRN
Status: DISCONTINUED | OUTPATIENT
Start: 2018-11-20 | End: 2018-11-20 | Stop reason: HOSPADM

## 2018-11-20 RX ORDER — IBUPROFEN 600 MG/1
600 TABLET ORAL EVERY 6 HOURS PRN
Qty: 30 TABLET | Refills: 0
Start: 2018-11-20 | End: 2019-01-24 | Stop reason: SDUPTHER

## 2018-11-20 RX ORDER — OXYCODONE HYDROCHLORIDE AND ACETAMINOPHEN 5; 325 MG/1; MG/1
1 TABLET ORAL EVERY 4 HOURS PRN
Status: DISCONTINUED | OUTPATIENT
Start: 2018-11-20 | End: 2018-11-20 | Stop reason: HOSPADM

## 2018-11-20 RX ADMIN — SODIUM CHLORIDE, SODIUM LACTATE, POTASSIUM CHLORIDE, AND CALCIUM CHLORIDE 75 ML/HR: .6; .31; .03; .02 INJECTION, SOLUTION INTRAVENOUS at 16:50

## 2018-11-20 RX ADMIN — METOCLOPRAMIDE 10 MG: 5 INJECTION, SOLUTION INTRAMUSCULAR; INTRAVENOUS at 15:50

## 2018-11-20 RX ADMIN — KETOROLAC TROMETHAMINE 30 MG: 30 INJECTION, SOLUTION INTRAMUSCULAR at 16:04

## 2018-11-20 RX ADMIN — SODIUM CHLORIDE, SODIUM LACTATE, POTASSIUM CHLORIDE, AND CALCIUM CHLORIDE 20 ML/HR: .6; .31; .03; .02 INJECTION, SOLUTION INTRAVENOUS at 13:47

## 2018-11-20 RX ADMIN — IBUPROFEN 600 MG: 400 TABLET ORAL at 18:01

## 2018-11-20 RX ADMIN — LIDOCAINE HYDROCHLORIDE 50 MG: 10 INJECTION, SOLUTION INFILTRATION; PERINEURAL at 15:42

## 2018-11-20 RX ADMIN — ONDANSETRON 4 MG: 2 INJECTION INTRAMUSCULAR; INTRAVENOUS at 15:50

## 2018-11-20 RX ADMIN — MIDAZOLAM 2 MG: 1 INJECTION INTRAMUSCULAR; INTRAVENOUS at 15:36

## 2018-11-20 RX ADMIN — PROPOFOL 200 MG: 10 INJECTION, EMULSION INTRAVENOUS at 15:42

## 2018-11-20 RX ADMIN — SODIUM CHLORIDE, SODIUM LACTATE, POTASSIUM CHLORIDE, AND CALCIUM CHLORIDE: .6; .31; .03; .02 INJECTION, SOLUTION INTRAVENOUS at 14:00

## 2018-11-20 RX ADMIN — FENTANYL CITRATE 50 MCG: 50 INJECTION, SOLUTION INTRAMUSCULAR; INTRAVENOUS at 15:42

## 2018-11-20 NOTE — H&P (VIEW-ONLY)
History & Physical - OB/GYN   Farhana Williamson 50 y o  female MRN: [id number]  Unit/Bed#:  Encounter: [id number]    HPI:  Ms Yovanny Pierce is a 50 y o  postmenopausal patient who presents for H&P prior to hysteroscopy and D&C for postmenopausal bleeding  She had previously underwent EMB for this, which was suggestive of endometrial polyp  Today she is without complaint  Notes cramping off and on, unchanged from her typical unfortunately  Denies fever, chills, chest pain, shortness of breath, nausea/vomiting, change in discharge or bleeding profile, dysuria, or leg pain  Active Problems:  Patient Active Problem List   Diagnosis    Postmenopausal bleeding    Loose stools    Essential hypertension    Dyslipidemia    Obesity (BMI 30-39  9)    Prediabetes    Anxiety disorder    IBS (irritable bowel syndrome)    Abnormal uterine bleeding (AUB)    Other fatigue       PMH:  Past Medical History:   Diagnosis Date    Discharge from right nipple     last assessed 03/20/2013    Hyperlipidemia     Hypertension        PSH:  Past Surgical History:   Procedure Laterality Date    CARPAL TUNNEL RELEASE      TUBAL LIGATION      WRIST SURGERY Bilateral        Social Hx:  Social History     Social History    Marital status: /Civil Union     Spouse name: N/A    Number of children: N/A    Years of education: N/A     Occupational History    Not on file       Social History Main Topics    Smoking status: Former Smoker    Smokeless tobacco: Former User      Comment: 2pk day, quit 2/2018    Alcohol use No    Drug use: No    Sexual activity: Yes     Partners: Male     Birth control/ protection: Post-menopausal     Other Topics Concern    Not on file     Social History Narrative    Current everyday smoker per [employer]       Meds:  Current Outpatient Prescriptions on File Prior to Visit   Medication Sig Dispense Refill    atorvastatin (LIPITOR) 40 mg tablet Take 1 tablet (40 mg total) by mouth daily 30 tablet 2    cetirizine (ZyrTEC) 10 mg tablet Take 1 tablet (10 mg total) by mouth daily 30 tablet 1    fluticasone (FLONASE) 50 mcg/act nasal spray 2 sprays into each nostril daily (Patient taking differently: 2 sprays into each nostril as needed  ) 16 g 0    hydrochlorothiazide (HYDRODIURIL) 12 5 mg tablet Take 1 tablet (12 5 mg total) by mouth daily 30 tablet 3    PROAIR  (90 Base) MCG/ACT inhaler Inhale 2 puffs every 6 (six) hours as needed  0     Current Facility-Administered Medications on File Prior to Visit   Medication Dose Route Frequency Provider Last Rate Last Dose    [DISCONTINUED] albuterol (PROVENTIL HFA,VENTOLIN HFA) inhaler 2 puff  2 puff Inhalation Q4H PRN Adelina Goldmann, MD           Allergies: Allergies   Allergen Reactions    Bee Pollen      Other reaction(s): Allergic Rhinitis, Sneezing    Pollen Extract Allergic Rhinitis and Sneezing       Physical Exam:  /73   Pulse 80   Ht 5' 3" (1 6 m)   Wt 88 5 kg (195 lb)   LMP 01/01/2016   BMI 34 54 kg/m²     Physical Exam   Constitutional: She is oriented to person, place, and time  She appears well-developed and well-nourished  HENT:   Head: Normocephalic and atraumatic  Eyes: No scleral icterus  Cardiovascular: Normal rate, regular rhythm and normal heart sounds  Exam reveals no gallop and no friction rub  No murmur heard  Pulmonary/Chest: Effort normal and breath sounds normal  No respiratory distress  She has no wheezes  She has no rales  Abdominal: She exhibits no distension  There is no tenderness  There is no rebound and no guarding  Neurological: She is alert and oriented to person, place, and time  Skin: Skin is warm and dry  No rash noted  No erythema  No pallor  Psychiatric: She has a normal mood and affect  Her behavior is normal          Assessment:   50 y o  postmenopausal patient who presents for H&P prior to hysteroscopy and D&C for postmenopausal bleeding  Consents reviewed and signed today   Patient counseled on risk of procedure, including but not limited to bleeding, infection, injury to surrounding structures, uterine perforation, need for more extensive procedures, or occult pathology  Patient had the opportunity to ask questions, which were answered to her satisfaction  Informed consent was obtained  Plan:   1  Hysteroscopy, D&C as planned  2  NPO from midnight preop  3  No indication for antibiotics  4  Return to office in 2wks post-op for postop exam    Discussed and consented with Dr Rowan Luevano

## 2018-11-20 NOTE — ANESTHESIA POSTPROCEDURE EVALUATION
Post-Op Assessment Note      CV Status:  Stable    Mental Status:  Alert and awake    Hydration Status:  Euvolemic    PONV Controlled:  Controlled    Airway Patency:  Patent    Post Op Vitals Reviewed: Yes          Staff: CRNA, Anesthesiologist           BP   141/82 (111)   Temp   97 8   Pulse   77   Resp   16   SpO2   100% on FM

## 2018-11-20 NOTE — PROGRESS NOTES
Pt's daughters Ai Martino and Chrisandra Bernheim and Constantine's children Ashley Uribe and Joleen to bedside  Patient tolerated light snack PO

## 2018-11-20 NOTE — ANESTHESIA PREPROCEDURE EVALUATION
Review of Systems/Medical History  Patient summary reviewed  Chart reviewed  No history of anesthetic complications     Cardiovascular  Exercise tolerance (METS): >4,  Hyperlipidemia, Hypertension controlled,    Pulmonary  Smoker ex-smoker  , Asthma , well controlled/ stable ,   Comment: No clear hx of Asthma, prescribed Albuterol during episode of bronchitis 1 yr ago, currently still uses prn    Quit Smoke 6 mos ago, no recent URI     GI/Hepatic  Negative GI/hepatic ROS     Comment: Confirmed NPO > 8hrs     Negative  ROS        Endo/Other    Comment: BMI 34  Pre-diabetic  Red rash noted on pt L and R back Obesity    GYN      Comment: Hx of tubal ligation  Hx of intermittent bleeding, currently minimal, no hx of transfusions     Hematology  Negative hematology ROS      Musculoskeletal  Negative musculoskeletal ROS        Neurology  Negative neurology ROS      Psychology   Anxiety,              Physical Exam    Airway    Mallampati score: I  TM Distance: >3 FB  Neck ROM: full     Dental       Cardiovascular      Pulmonary      Other Findings  Normal dentition      Anesthesia Plan  ASA Score- 2     Anesthesia Type- general with ASA Monitors  Additional Monitors:   Airway Plan: LMA  Comment: Chepe Abdullahi MD, have personally seen and evaluated the patient prior to anesthetic care  I have reviewed the pre-anesthetic record, medical history, allergies, medications and any other medical records if appropriate to the anesthetic care  Risks/benefits and alternatives discussed with patient including possible PONV, sore throat, and possibility of rare anesthetic and surgical emergencies  If a CRNA is involved in the case, I have reviewed the CRNA assessment, if present, and agree        Plan Factors-  Patient did not smoke on day of surgery  Induction- intravenous  Postoperative Plan- Plan for postoperative opioid use   Planned trial extubation    Informed Consent- Anesthetic plan and risks discussed with patient  I personally reviewed this patient with the CRNA  Discussed and agreed on the Anesthesia Plan with the CRNA  Steve gAuillon

## 2018-11-20 NOTE — OP NOTE
OPERATIVE REPORT  PATIENT NAME: Klaus Orantes    :  1970  MRN: [id number]  Pt Location:  OR ROOM 05    SURGERY DATE: 2018    Surgeon(s) and Role:     * Caroline Marsh DO - Primary     * Tanika Ellis MD - Assisting     * Rigoberto Rodriguez MD - Assisting    Preop Diagnosis:  Postmenopausal bleeding [N95 0]    Post-Op Diagnosis Codes:     * Postmenopausal bleeding [N95 0]    Procedure(s) (LRB):  DILATATION AND CURETTAGE (D&C) WITH HYSTEROSCOPY (N/A)    Specimen(s):  ID Type Source Tests Collected by Time Destination   2 : curretings  Tissue Endometrium TISSUE EXAM Caroline DO Salma 2018 1559        Estimated Blood Loss:   Minimal    Anesthesia Type:   General    Operative Indications:  Postmenopausal bleeding [N95 0]    Operative Findings: Endometrial polyp, multiparous cervix, male-distribution hair pattern     Complications: None    Procedure and Technique:  Patient was taken to the operating room where a time out was performed to confirm correct patient and correct procedure  General LMA anesthesia (LMA) was administered and the patient was positioned on the OR table in the dorsal lithotomy position  All pressure points were padded and a mark hugger was placed to maintain control of core body temperature  A bimanual exam was performed and the uterus was noted to be anteverted, normal in size and consistency with no palpable adnexal masses or fullness  The patient was prepped and draped in the usual sterile fashion  A weighted speculum was inserted into the vagina and a right angle retractor was used to visualize the anterior lip of the cervix, which was then grasped with a single toothed tenaculum  A paracervical block was placed at 4 and 8 o'clock with 1% lidocaine  The uterus was sounded to 9cm  The cervix was serially dilated to 5 using Yuan dilators for introduction of the hysteroscope       Hysteroscope was introduced under direct visualization using normal saline solution as the distention media  Hysteroscope was advanced to the uterine fundus and the entire uterine cavity was inspected in a systematic manner  There was noted to be a smooth, pale fundus, normal ostia and an endometrial polyp at 8 o'clock in the endocervical canal  Hysteroscope was withdrawn and sharp curetting was performed, starting at the 12'oclock position and rotating a total of 360 degrees to cover all surfaces  Endometrial tissue was obtained and sent for pathology  The single toothed tenaculum was removed from the anterior lip of the cervix  Good hemostasis was confirmed at the tenaculum puncture sites  Weighted speculum was then removed from the vagina  At the conclusion of the procedure, all needle, sponge, and instrument counts were noted to be correct x2  Dr Grey Corbin and Dr Jordan Esquivel were present and participated in all key portions of the case      Patient Disposition: PACU     SIGNATURE: Fanny Jensen MD  DATE: November 20, 2018  TIME: 4:20 PM

## 2018-11-20 NOTE — DISCHARGE INSTRUCTIONS
Dilation and Curettage   WHAT YOU SHOULD KNOW:   Dilation and curettage (D and C) is a procedure to remove tissue from the lining of your uterus  AFTER YOU LEAVE:   Medicines:   · Pain medicine: You may be given medicine to take away or decrease pain  Do not wait until the pain is severe before you take your medicine  · Take your medicine as directed  Call your healthcare provider if you think your medicine is not helping or if you have side effects  Tell him if you are allergic to any medicine  Keep a list of the medicines, vitamins, and herbs you take  Include the amounts, and when and why you take them  Bring the list or the pill bottles to follow-up visits  Carry your medicine list with you in case of an emergency  Follow up with your healthcare provider as directed:  Write down your questions so you remember to ask them during your visits  Activity:  Ask your primary healthcare provider when you can return to your normal activities  Contact your primary healthcare provider if:   · You have foul-smelling vaginal discharge  · You do not get your monthly period  · You feel depressed or anxious  · You feel very tired and weak  · You have questions or concerns about your condition or care  Seek care immediately or call 911 if:   · You have heavy vaginal bleeding that soaks 1 pad in 1 hour for 2 hours in a row  · You have a fever and abdominal cramps  · Your pain does not get better, even after treatment  © 2014 [employer] is for End User's use only and may not be sold, redistributed or otherwise used for commercial purposes  All illustrations and images included in CareNotes® are the copyrighted property of [employer]  or [employer]  The above information is an  only  It is not intended as medical advice for individual conditions or treatments   Talk to your doctor, nurse or pharmacist before following any medical regimen to see if it is safe and effective for you

## 2018-11-25 ENCOUNTER — HOSPITAL ENCOUNTER (EMERGENCY)
Facility: HOSPITAL | Age: 48
Discharge: HOME | End: 2018-11-25
Attending: EMERGENCY MEDICINE | Admitting: EMERGENCY MEDICINE
Payer: COMMERCIAL

## 2018-11-25 ENCOUNTER — APPOINTMENT (EMERGENCY)
Dept: RADIOLOGY | Facility: HOSPITAL | Age: 48
End: 2018-11-25
Payer: COMMERCIAL

## 2018-11-25 VITALS
TEMPERATURE: 97.9 F | OXYGEN SATURATION: 98 % | DIASTOLIC BLOOD PRESSURE: 72 MMHG | HEART RATE: 80 BPM | SYSTOLIC BLOOD PRESSURE: 129 MMHG | RESPIRATION RATE: 20 BRPM

## 2018-11-25 DIAGNOSIS — M54.50 LOWER BACK PAIN: ICD-10-CM

## 2018-11-25 DIAGNOSIS — R11.0 NAUSEA: ICD-10-CM

## 2018-11-25 DIAGNOSIS — R10.30 LOWER ABDOMINAL PAIN: Primary | ICD-10-CM

## 2018-11-25 LAB
ALBUMIN SERPL BCP-MCNC: 4 G/DL (ref 3.5–5)
ALP SERPL-CCNC: 130 U/L (ref 46–116)
ALT SERPL W P-5'-P-CCNC: 33 U/L (ref 12–78)
ANION GAP SERPL CALCULATED.3IONS-SCNC: 4 MMOL/L (ref 4–13)
AST SERPL W P-5'-P-CCNC: 19 U/L (ref 5–45)
BACTERIA UR QL AUTO: ABNORMAL /HPF
BASOPHILS # BLD AUTO: 0.03 THOUSANDS/ΜL (ref 0–0.1)
BASOPHILS NFR BLD AUTO: 0 % (ref 0–1)
BILIRUB SERPL-MCNC: 0.28 MG/DL (ref 0.2–1)
BILIRUB UR QL STRIP: NEGATIVE
BUN SERPL-MCNC: 18 MG/DL (ref 5–25)
CALCIUM SERPL-MCNC: 8.6 MG/DL (ref 8.3–10.1)
CHLORIDE SERPL-SCNC: 102 MMOL/L (ref 100–108)
CLARITY UR: CLEAR
CLARITY, POC: CLEAR
CO2 SERPL-SCNC: 31 MMOL/L (ref 21–32)
COLOR UR: YELLOW
COLOR, POC: YELLOW
CREAT SERPL-MCNC: 0.84 MG/DL (ref 0.6–1.3)
EOSINOPHIL # BLD AUTO: 0.34 THOUSAND/ΜL (ref 0–0.61)
EOSINOPHIL NFR BLD AUTO: 3 % (ref 0–6)
ERYTHROCYTE [DISTWIDTH] IN BLOOD BY AUTOMATED COUNT: 12.9 % (ref 11.6–15.1)
EXT PREG TEST URINE: NEGATIVE
GFR SERPL CREATININE-BSD FRML MDRD: 82 ML/MIN/1.73SQ M
GLUCOSE SERPL-MCNC: 99 MG/DL (ref 65–140)
GLUCOSE UR STRIP-MCNC: NEGATIVE MG/DL
HCT VFR BLD AUTO: 36.8 % (ref 34.8–46.1)
HGB BLD-MCNC: 12.2 G/DL (ref 11.5–15.4)
HGB UR QL STRIP.AUTO: ABNORMAL
HYALINE CASTS #/AREA URNS LPF: ABNORMAL /LPF
IMM GRANULOCYTES # BLD AUTO: 0.04 THOUSAND/UL (ref 0–0.2)
IMM GRANULOCYTES NFR BLD AUTO: 0 % (ref 0–2)
KETONES UR STRIP-MCNC: NEGATIVE MG/DL
LEUKOCYTE ESTERASE UR QL STRIP: NEGATIVE
LIPASE SERPL-CCNC: 66 U/L (ref 73–393)
LYMPHOCYTES # BLD AUTO: 3.39 THOUSANDS/ΜL (ref 0.6–4.47)
LYMPHOCYTES NFR BLD AUTO: 31 % (ref 14–44)
MCH RBC QN AUTO: 29.3 PG (ref 26.8–34.3)
MCHC RBC AUTO-ENTMCNC: 33.2 G/DL (ref 31.4–37.4)
MCV RBC AUTO: 89 FL (ref 82–98)
MONOCYTES # BLD AUTO: 0.69 THOUSAND/ΜL (ref 0.17–1.22)
MONOCYTES NFR BLD AUTO: 6 % (ref 4–12)
NEUTROPHILS # BLD AUTO: 6.33 THOUSANDS/ΜL (ref 1.85–7.62)
NEUTS SEG NFR BLD AUTO: 60 % (ref 43–75)
NITRITE UR QL STRIP: NEGATIVE
NON-SQ EPI CELLS URNS QL MICRO: ABNORMAL /HPF
NRBC BLD AUTO-RTO: 0 /100 WBCS
PH UR STRIP.AUTO: 6 [PH] (ref 4.5–8)
PLATELET # BLD AUTO: 275 THOUSANDS/UL (ref 149–390)
PMV BLD AUTO: 9.4 FL (ref 8.9–12.7)
POTASSIUM SERPL-SCNC: 3.4 MMOL/L (ref 3.5–5.3)
PROT SERPL-MCNC: 8 G/DL (ref 6.4–8.2)
PROT UR STRIP-MCNC: NEGATIVE MG/DL
RBC # BLD AUTO: 4.16 MILLION/UL (ref 3.81–5.12)
RBC #/AREA URNS AUTO: ABNORMAL /HPF
SODIUM SERPL-SCNC: 137 MMOL/L (ref 136–145)
SP GR UR STRIP.AUTO: 1.01 (ref 1–1.03)
UROBILINOGEN UR QL STRIP.AUTO: 0.2 E.U./DL
WBC # BLD AUTO: 10.82 THOUSAND/UL (ref 4.31–10.16)
WBC #/AREA URNS AUTO: ABNORMAL /HPF

## 2018-11-25 PROCEDURE — 81001 URINALYSIS AUTO W/SCOPE: CPT

## 2018-11-25 PROCEDURE — 74177 CT ABD & PELVIS W/CONTRAST: CPT

## 2018-11-25 PROCEDURE — 80053 COMPREHEN METABOLIC PANEL: CPT | Performed by: EMERGENCY MEDICINE

## 2018-11-25 PROCEDURE — 96374 THER/PROPH/DIAG INJ IV PUSH: CPT

## 2018-11-25 PROCEDURE — 81025 URINE PREGNANCY TEST: CPT | Performed by: EMERGENCY MEDICINE

## 2018-11-25 PROCEDURE — 85025 COMPLETE CBC W/AUTO DIFF WBC: CPT | Performed by: EMERGENCY MEDICINE

## 2018-11-25 PROCEDURE — 99284 EMERGENCY DEPT VISIT MOD MDM: CPT

## 2018-11-25 PROCEDURE — 83690 ASSAY OF LIPASE: CPT | Performed by: EMERGENCY MEDICINE

## 2018-11-25 PROCEDURE — 36415 COLL VENOUS BLD VENIPUNCTURE: CPT

## 2018-11-25 RX ORDER — ACETAMINOPHEN 325 MG/1
975 TABLET ORAL ONCE
Status: COMPLETED | OUTPATIENT
Start: 2018-11-25 | End: 2018-11-25

## 2018-11-25 RX ORDER — KETOROLAC TROMETHAMINE 30 MG/ML
15 INJECTION, SOLUTION INTRAMUSCULAR; INTRAVENOUS ONCE
Status: COMPLETED | OUTPATIENT
Start: 2018-11-25 | End: 2018-11-25

## 2018-11-25 RX ADMIN — KETOROLAC TROMETHAMINE 15 MG: 30 INJECTION, SOLUTION INTRAMUSCULAR at 21:05

## 2018-11-25 RX ADMIN — ACETAMINOPHEN 975 MG: 325 TABLET, FILM COATED ORAL at 21:08

## 2018-11-25 RX ADMIN — IOHEXOL 100 ML: 350 INJECTION, SOLUTION INTRAVENOUS at 21:38

## 2018-11-26 NOTE — ED PROVIDER NOTES
History  Chief Complaint   Patient presents with    Abdominal Pain     D&C done Tuesday  patient with low abdominal pain today     HPI      77-year-old female pmhx hyperlipidemia, hypertension presents for evaluation of lower abdominal pain  Patient says she had a D&C (for postmenopausal bleeding) 6 days ago and has had constant lower abdominal/suprapubic pain  She describes pain as a pressure, 6/10  Pain felt more intense today and radiated to her right flank and right lower back  Pain improves when lying on her left lateral recumbent  She also notes she has had vaginal bleeding (about 2 pads/day since procedure) and had to use 3 pads today and noticed small clots  Admits to + nausea  Denies fever, chills, chest pain, shortness of breath, vomiting, diarrhea, dysuria, hematuria, urinary retention, weakness, numbness, or saddle anesthesia  Prior to Admission Medications   Prescriptions Last Dose Informant Patient Reported? Taking?    PROAIR  (90 Base) MCG/ACT inhaler  Self Yes No   Sig: Inhale 2 puffs every 6 (six) hours as needed   acetaminophen (TYLENOL) 650 mg CR tablet   Yes No   Sig: Take 650 mg by mouth every 8 (eight) hours as needed   atorvastatin (LIPITOR) 40 mg tablet   No No   Sig: Take 1 tablet (40 mg total) by mouth daily   ciprofloxacin-dexamethasone (CIPRODEX) otic suspension   No No   Sig: Administer 4 drops to the right ear 2 (two) times a day for 7 days   fexofenadine (ALLEGRA) 60 MG tablet   No No   Sig: Take 1 tablet (60 mg total) by mouth daily   fluticasone (FLONASE) 50 mcg/act nasal spray  Self No No   Si sprays into each nostril daily   Patient taking differently: 2 sprays into each nostril as needed     hydrochlorothiazide (HYDRODIURIL) 12 5 mg tablet   No No   Sig: Take 1 tablet (12 5 mg total) by mouth daily   ibuprofen (MOTRIN) 600 mg tablet   No No   Sig: Take 1 tablet (600 mg total) by mouth every 6 (six) hours as needed for mild pain   varenicline (CHANTIX RAMESH) 0 5 MG X 11 & 1 MG X 42 tablet   Yes No   Sig: Take by mouth 2 (two) times a day Take one 0 5mg tab by mouth 1x daily for 3 days, then increase to one 0 5mg tab 2x daily for 3 days, then increase to one 1mg tab 2x daily      Facility-Administered Medications: None       Past Medical History:   Diagnosis Date    Discharge from right nipple     last assessed 03/20/2013    Hyperlipidemia     Hypertension        Past Surgical History:   Procedure Laterality Date    CARPAL TUNNEL RELEASE      [place] HYSTEROSCOPY,W/ENDO BX N/A 11/20/2018    Procedure: DILATATION AND CURETTAGE (D&C) WITH HYSTEROSCOPY;  Surgeon: Nandini Mancini DO;  Location: [facility];  Service: Gynecology    TUBAL LIGATION      WRIST SURGERY Bilateral        Family History   Problem Relation Age of Onset    Heart attack Mother     Diabetes Father     Hypertension Father     Hyperlipidemia Father     Breast cancer Maternal Grandmother      I have reviewed and agree with the history as documented  Social History   Substance Use Topics    Smoking status: Former Smoker    Smokeless tobacco: Former User      Comment: 2pk day, quit 2/2018    Alcohol use No        Review of Systems   Constitutional: Negative for chills, diaphoresis, fatigue and fever  HENT: Negative for congestion, rhinorrhea and sore throat  Eyes: Negative for photophobia and visual disturbance  Respiratory: Negative for cough, chest tightness and shortness of breath  Cardiovascular: Negative for chest pain and palpitations  Gastrointestinal: Positive for abdominal pain and nausea  Negative for blood in stool, constipation, diarrhea and vomiting  Genitourinary: Positive for flank pain and vaginal bleeding  Negative for decreased urine volume, dysuria, frequency and hematuria  Musculoskeletal: Positive for back pain  Negative for gait problem, myalgias, neck pain and neck stiffness  Skin: Negative for pallor and rash     Neurological: Negative for weakness, light-headedness, numbness and headaches  Hematological: Negative for adenopathy  Does not bruise/bleed easily  All other systems reviewed and are negative  Physical Exam  ED Triage Vitals   Temperature Pulse Respirations Blood Pressure SpO2   11/25/18 1757 11/25/18 1757 11/25/18 1757 11/25/18 1757 11/25/18 1757   97 9 °F (36 6 °C) 80 16 158/83 96 %      Temp src Heart Rate Source Patient Position - Orthostatic VS BP Location FiO2 (%)   -- 11/25/18 2037 11/25/18 2037 11/25/18 2037 --    Monitor Sitting Right arm       Pain Score       11/25/18 1757       6           Orthostatic Vital Signs  Vitals:    11/25/18 1757 11/25/18 2037   BP: 158/83 129/72   Pulse: 80 80   Patient Position - Orthostatic VS:  Sitting       Physical Exam   Constitutional: She is oriented to person, place, and time  No distress  Patient is alert and oriented, appears well and nontoxic, in no acute distress   HENT:   Head: Normocephalic and atraumatic  Mouth/Throat: Oropharynx is clear and moist  No oropharyngeal exudate  Eyes: Pupils are equal, round, and reactive to light  Conjunctivae and EOM are normal    Neck: Normal range of motion  Neck supple  Cardiovascular: Normal rate, regular rhythm, normal heart sounds and intact distal pulses  Pulmonary/Chest: Effort normal and breath sounds normal  No respiratory distress  Abdominal: Soft  Bowel sounds are normal  She exhibits no distension  There is tenderness  There is no guarding  Suprapubic and right lower quadrant tenderness on palpation   Musculoskeletal: Normal range of motion  She exhibits tenderness  She exhibits no edema or deformity  Mild right lumbosacral tenderness on deep palpation, no obvious erythema or edema noted   Lymphadenopathy:     She has no cervical adenopathy  Neurological: She is alert and oriented to person, place, and time  Skin: Skin is warm and dry  Capillary refill takes less than 2 seconds  No rash noted  She is not diaphoretic   No erythema  No pallor  Psychiatric: She has a normal mood and affect  Her behavior is normal  Judgment and thought content normal    Nursing note and vitals reviewed        ED Medications  Medications   ketorolac (TORADOL) injection 15 mg (15 mg Intravenous Given 11/25/18 2105)   acetaminophen (TYLENOL) tablet 975 mg (975 mg Oral Given 11/25/18 2108)   iohexol (OMNIPAQUE) 350 MG/ML injection (MULTI-DOSE) 100 mL (100 mL Intravenous Given 11/25/18 2138)       Diagnostic Studies  Results Reviewed     Procedure Component Value Units Date/Time    Urine Microscopic [[id number]]  (Abnormal) Collected:  11/25/18 2043    Lab Status:  Final result Specimen:  Urine from Urine, Clean Catch Updated:  11/25/18 2059     RBC, UA 2-4 (A) /hpf      WBC, UA None Seen /hpf      Epithelial Cells None Seen /hpf      Bacteria, UA None Seen /hpf      Hyaline Casts, UA None Seen /lpf     POCT pregnancy, urine [[id number]]  (Normal) Resulted:  11/25/18 2045    Lab Status:  Final result Updated:  11/25/18 2045     EXT PREG TEST UR (Ref: Negative) NEGATIVE    POCT urinalysis dipstick [[id number]]  (Normal) Resulted:  11/25/18 2042    Lab Status:  Final result Specimen:  Urine Updated:  11/25/18 2045     Color, UA YELLOW     Clarity, UA CLEAR    ED Urine Macroscopic [[id number]]  (Abnormal) Collected:  11/25/18 2043    Lab Status:  Final result Specimen:  Urine Updated:  11/25/18 2042     Color, UA Yellow     Clarity, UA Clear     pH, UA 6 0     Leukocytes, UA Negative     Nitrite, UA Negative     Protein, UA Negative mg/dl      Glucose, UA Negative mg/dl      Ketones, UA Negative mg/dl      Urobilinogen, UA 0 2 E U /dl      Bilirubin, UA Negative     Blood, UA Moderate (A)     Specific [place], UA 1 015    Narrative:       CLINITEK RESULT    Comprehensive metabolic panel [[id number]]  (Abnormal) Collected:  11/25/18 1934    Lab Status:  Final result Specimen:  Blood from Arm, Left Updated:  11/25/18 2003     Sodium 137 mmol/L      Potassium 3 4 (L) mmol/L      Chloride 102 mmol/L      CO2 31 mmol/L      ANION GAP 4 mmol/L      BUN 18 mg/dL      Creatinine 0 84 mg/dL      Glucose 99 mg/dL      Calcium 8 6 mg/dL      AST 19 U/L      ALT 33 U/L      Alkaline Phosphatase 130 (H) U/L      Total Protein 8 0 g/dL      Albumin 4 0 g/dL      Total Bilirubin 0 28 mg/dL      eGFR 82 ml/min/1 73sq m     Narrative:         National Kidney Disease Education Program recommendations are as follows:  GFR calculation is accurate only with a steady state creatinine  Chronic Kidney disease less than 60 ml/min/1 73 sq  meters  Kidney failure less than 15 ml/min/1 73 sq  meters  Lipase [[id number]]  (Abnormal) Collected:  11/25/18 1934    Lab Status:  Final result Specimen:  Blood from Arm, Left Updated:  11/25/18 2003     Lipase 66 (L) u/L     CBC and differential [[id number]]  (Abnormal) Collected:  11/25/18 1934    Lab Status:  Final result Specimen:  Blood from Arm, Left Updated:  11/25/18 1944     WBC 10 82 (H) Thousand/uL      RBC 4 16 Million/uL      Hemoglobin 12 2 g/dL      Hematocrit 36 8 %      MCV 89 fL      MCH 29 3 pg      MCHC 33 2 g/dL      RDW 12 9 %      MPV 9 4 fL      Platelets 444 Thousands/uL      nRBC 0 /100 WBCs      Neutrophils Relative 60 %      Immat GRANS % 0 %      Lymphocytes Relative 31 %      Monocytes Relative 6 %      Eosinophils Relative 3 %      Basophils Relative 0 %      Neutrophils Absolute 6 33 Thousands/µL      Immature Grans Absolute 0 04 Thousand/uL      Lymphocytes Absolute 3 39 Thousands/µL      Monocytes Absolute 0 69 Thousand/µL      Eosinophils Absolute 0 34 Thousand/µL      Basophils Absolute 0 03 Thousands/µL                  CT abdomen pelvis with contrast   Final Result by Arron Louis MD (11/25 2209)      No acute abnormality identified  Uterus appears within normal limits  No pelvic fluid collections              Workstation performed: [id number]               Procedures  Procedures      Phone Consults  ED Phone Contact    ED Course  ED Course as of Nov 25 2230   [facility] Dear Nov 25, 2018 2222 On re-evaluation, patient admits pain has resolved  Discussed CT imaging with patient and my discussion with Ob resident Dr Jeanette Guerra over the phone                                MDM  Number of Diagnoses or Management Options  Lower abdominal pain:   Lower back pain:   Nausea:   Diagnosis management comments: Impression:  49-year-old female presents for evaluation of lower abdominal pain and vaginal bleeding s/p D&C  Ddx: UTI, musculoskeletal, D&C complication   Plan:  CBC, CMP, lipase, urinalysis, CT abdomen pelvis    Discussed case with OB resident who does not believe further OB evaluation or workup is necessitated  The patient was instructed to follow up as documented  Strict return precautions were discussed with the patient and the patient was instructed to return to the emergency department immediately if symptoms worsen  The patient/patient family member acknowledged and were in agreement with plan  CritCare Time    Disposition  Final diagnoses:   Lower abdominal pain   Nausea   Lower back pain     Time reflects when diagnosis was documented in both MDM as applicable and the Disposition within this note     Time User Action Codes Description Comment    11/25/2018 10:15 PM Miri King Add [R10 30] Lower abdominal pain     11/25/2018 10:15 PM Tracie Potts [R11 0] Nausea     11/25/2018 10:16 PM Miri King Add [M54 5] Lower back pain       ED Disposition     ED Disposition Condition Comment    Discharge  Maribell Huddleston discharge to home/self care      Condition at discharge: Good        Follow-up Information     Follow up With Specialties Details Why Contact Massiel Crowder DO Obstetrics and Gynecology, Obstetrics, Gynecology Call in 1 day As needed, If symptoms worsen [address]  [phone]            Discharge Medication List as of 11/25/2018 10:17 PM      CONTINUE these medications which have NOT CHANGED    Details   acetaminophen (TYLENOL) 650 mg CR tablet Take 650 mg by mouth every 8 (eight) hours as needed, Historical Med      atorvastatin (LIPITOR) 40 mg tablet Take 1 tablet (40 mg total) by mouth daily, Starting Thu 11/8/2018, Normal      ciprofloxacin-dexamethasone (CIPRODEX) otic suspension Administer 4 drops to the right ear 2 (two) times a day for 7 days, Starting Fri 11/16/2018, Until Fri 11/23/2018, Normal      fexofenadine (ALLEGRA) 60 MG tablet Take 1 tablet (60 mg total) by mouth daily, Starting Fri 11/16/2018, Normal      fluticasone (FLONASE) 50 mcg/act nasal spray 2 sprays into each nostril daily, Starting Wed 9/5/2018, Normal      hydrochlorothiazide (HYDRODIURIL) 12 5 mg tablet Take 1 tablet (12 5 mg total) by mouth daily, Starting Fri 11/16/2018, Normal      ibuprofen (MOTRIN) 600 mg tablet Take 1 tablet (600 mg total) by mouth every 6 (six) hours as needed for mild pain, Starting Tue 11/20/2018, No Print      PROAIR  (90 Base) MCG/ACT inhaler Inhale 2 puffs every 6 (six) hours as needed, Starting Wed 4/25/2018, Historical Med      varenicline (CHANTIX RAMESH) 0 5 MG X 11 & 1 MG X 42 tablet Take by mouth 2 (two) times a day Take one 0 5mg tab by mouth 1x daily for 3 days, then increase to one 0 5mg tab 2x daily for 3 days, then increase to one 1mg tab 2x daily, Historical Med           No discharge procedures on file  ED Provider  Attending physically available and evaluated Karmen Baires I managed the patient along with the ED Attending      Electronically Signed by         Catalina Calvo MD  11/25/18 9381

## 2018-11-26 NOTE — ED ATTENDING ATTESTATION
Verner Patches, MD, saw and evaluated the patient  I have discussed the patient with the resident/non-physician practitioner and agree with the resident's/non-physician practitioner's findings, Plan of Care, and MDM as documented in the resident's/non-physician practitioner's note, except where noted  All available labs and Radiology studies were reviewed  At this point I agree with the current assessment done in the Emergency Department  I have conducted an independent evaluation of this patient a history and physical is as follows:    Patient presents for evaluation of lower abdominal pain that started after a D&C 6 days ago  Patient states that today the symptoms were more intense and radiated to her right flank and back  Patient is also still having ongoing vaginal bleeding  No additional complaints  9 Exam: AAOx3, NAD, RRR, CTA, lower abdominal tenderness to palpation, no motor/sensory deficits  A/P:  Abdominal pain  Will check labs in urine  Given recent procedure, will CT abdomen pelvis      Critical Care Time  CritCare Time    Procedures

## 2018-11-26 NOTE — DISCHARGE INSTRUCTIONS
Pelvic Pain   WHAT YOU NEED TO KNOW:   Pelvic pain may be caused by a number of conditions, such as irritable bowel syndrome, appendicitis, or constipation  A urinary tract infection, prostate inflammation, menstrual cramps, or kidney stones can also cause pelvic pain  You may have pain on one or both sides of your pelvis  Pelvic pain can develop if you have trauma to your pelvis or if you sit or stand for a long time  DISCHARGE INSTRUCTIONS:   Medicines: You may need any of the following:  · NSAIDs , such as ibuprofen, help decrease swelling, pain, and fever  This medicine is available with or without a doctor's order  NSAIDs can cause stomach bleeding or kidney problems in certain people  If you take blood thinner medicine, always ask your healthcare provider if NSAIDs are safe for you  Always read the medicine label and follow directions  · Prescription pain medicine  may be given  Ask how to take this medicine safely  · Birth control medicines  may help decrease pain in women  · Take your medicine as directed  Contact your healthcare provider if you think your medicine is not helping or if you have side effects  Tell him or her if you are allergic to any medicine  Keep a list of the medicines, vitamins, and herbs you take  Include the amounts, and when and why you take them  Bring the list or the pill bottles to follow-up visits  Carry your medicine list with you in case of an emergency  Call 911 for any of the following:   · You have severe chest pain and sudden trouble breathing  Contact your healthcare provider if:   · You have a fever  · You have nausea, vomiting, or diarrhea  · Your pain does not go away, or it gets worse, even after treatment  · You have numbness in your legs or toes  · You have heavy or unusual vaginal bleeding  · You have questions or concerns about your condition or care  Manage your pelvic pain:   · Keep a pain record    Write down when your pain happens and how severe it is  Include any other symptoms you have with your pain  A record will help you keep track of pain cycles  Bring the record with you to your follow-up visits  It may also help your healthcare provider find out what is causing your pain  · Learn ways to relax  Deep breathing, meditation, and relaxation techniques can help decrease your pain  When you are tense, your pain may increase  · Treat or prevent constipation  Drink liquids as directed  You may need to drink more liquid than usual  Ask your healthcare provider how much liquid to drink each day  Eat high-fiber foods  High-fiber foods include fruit, vegetables, whole-grain breads and cereals, and beans  You may need to change the foods you eat if you have irritable bowel syndrome  Follow up with your healthcare provider as directed: You may need physical therapy  You may need to see an orthopedic specialist  Write down your questions so you remember to ask them during your visits  © 2017 [employer]  Information is for End User's use only and may not be sold, redistributed or otherwise used for commercial purposes  All illustrations and images included in CareNotes® are the copyrighted property of A [employer] M [employer] Inc  or [employer]  The above information is an  only  It is not intended as medical advice for individual conditions or treatments  Talk to your doctor, nurse or pharmacist before following any medical regimen to see if it is safe and effective for you

## 2018-12-06 ENCOUNTER — OFFICE VISIT (OUTPATIENT)
Dept: OBGYN CLINIC | Facility: HOSPITAL | Age: 48
End: 2018-12-06

## 2018-12-06 VITALS
HEART RATE: 82 BPM | BODY MASS INDEX: 34.73 KG/M2 | HEIGHT: 63 IN | DIASTOLIC BLOOD PRESSURE: 74 MMHG | SYSTOLIC BLOOD PRESSURE: 131 MMHG | WEIGHT: 196 LBS

## 2018-12-06 DIAGNOSIS — N84.0 ENDOMETRIAL POLYP: ICD-10-CM

## 2018-12-06 DIAGNOSIS — N95.0 POSTMENOPAUSAL BLEEDING: Primary | ICD-10-CM

## 2018-12-06 PROBLEM — N93.9 ABNORMAL UTERINE BLEEDING (AUB): Status: RESOLVED | Noted: 2018-10-15 | Resolved: 2018-12-06

## 2018-12-06 PROCEDURE — 99024 POSTOP FOLLOW-UP VISIT: CPT | Performed by: OBSTETRICS & GYNECOLOGY

## 2018-12-06 NOTE — PROGRESS NOTES
Assessment:  50 y o  postmenopausal woman who is POD#2wks from hysteroscopy, D&C  She is doing well post-op  Plan:  Diagnoses and all orders for this visit:    Postmenopausal bleeding secondary to Endometrial polyp  - s/p D&C  - Counseled to return to office if further episodes of bleeding noted    Health maintenance  - Follows with PCP for paps; reviewed that she is due this spring and encouraged to schedule appointment with them           __________________________________________________________________    Subjective   Sanchez Flaming is a 50 y o  postmenopausal woman who presents POD#2wks from hysteroscopy, D&C for postmenopausal bleeding  Intraoperative findings revealed polyp; pathology confirms same  Patient reports she presented to ED within 1wk after surgery for severe cramping  Also still had bleeding at that time  Evaluation was benign and she received "a shot for pain " This resolved her pain entirely and her bleeding stopped the next day  At present, she feels well and has had no more vaginal bleeding  Denies abnormal discharge  Her bowel function is normal, although she had diarrhea secondary to a "stomach bug" last week  She has returned to most of her normal activities  The following portions of the patient's history were reviewed and updated as appropriate: allergies, current medications, past family history, past medical history, past social history, past surgical history and problem list     Objective  /74 (BP Location: Left arm, Patient Position: Sitting, Cuff Size: Large)   Pulse 82   Ht 5' 3" (1 6 m)   Wt 88 9 kg (196 lb)   LMP 01/01/2016   BMI 34 72 kg/m²      Physical Exam:  Physical Exam   Constitutional: She is oriented to person, place, and time  She appears well-developed and well-nourished  No distress  HENT:   Head: Normocephalic and atraumatic  Eyes: No scleral icterus  Cardiovascular: Normal rate  Pulmonary/Chest: Effort normal  No accessory muscle usage  No respiratory distress  Abdominal: Soft  She exhibits distension (bloating appreciated, non-tender, non-tympanic)  There is no tenderness  There is no rebound and no guarding  Musculoskeletal: She exhibits no edema or tenderness  Neurological: She is alert and oriented to person, place, and time  Skin: Skin is warm and dry  No rash noted  No erythema  Psychiatric: She has a normal mood and affect  Her behavior is normal          Lab Review  Pathology  "A  Endometrium, curettings:     - Portions of a benign appearing endometrial polyp      - Background of fragmented weakly proliferative endometrium with stromal collapse       - No hyperplasia or  carcinoma identified "

## 2018-12-28 ENCOUNTER — OFFICE VISIT (OUTPATIENT)
Dept: URGENT CARE | Age: 48
End: 2018-12-28
Payer: COMMERCIAL

## 2018-12-28 VITALS
DIASTOLIC BLOOD PRESSURE: 76 MMHG | TEMPERATURE: 97 F | WEIGHT: 195 LBS | BODY MASS INDEX: 34.55 KG/M2 | OXYGEN SATURATION: 97 % | RESPIRATION RATE: 18 BRPM | SYSTOLIC BLOOD PRESSURE: 143 MMHG | HEART RATE: 90 BPM | HEIGHT: 63 IN

## 2018-12-28 DIAGNOSIS — J06.9 ACUTE UPPER RESPIRATORY INFECTION: Primary | ICD-10-CM

## 2018-12-28 DIAGNOSIS — J01.40 ACUTE NON-RECURRENT PANSINUSITIS: ICD-10-CM

## 2018-12-28 DIAGNOSIS — J45.21 MILD INTERMITTENT ASTHMATIC BRONCHITIS WITH ACUTE EXACERBATION: ICD-10-CM

## 2018-12-28 PROCEDURE — 99213 OFFICE O/P EST LOW 20 MIN: CPT | Performed by: FAMILY MEDICINE

## 2018-12-28 RX ORDER — AMOXICILLIN AND CLAVULANATE POTASSIUM 875; 125 MG/1; MG/1
1 TABLET, FILM COATED ORAL EVERY 12 HOURS SCHEDULED
Qty: 20 TABLET | Refills: 0 | Status: SHIPPED | OUTPATIENT
Start: 2018-12-28 | End: 2019-01-07

## 2018-12-28 RX ORDER — ALBUTEROL SULFATE 90 UG/1
2 AEROSOL, METERED RESPIRATORY (INHALATION) EVERY 4 HOURS PRN
Qty: 18 G | Refills: 0 | Status: SHIPPED | OUTPATIENT
Start: 2018-12-28 | End: 2019-04-26 | Stop reason: ALTCHOICE

## 2018-12-28 NOTE — PATIENT INSTRUCTIONS
Rest, limit activity  Augmentin twice a day until finished (please take probiotics)  Two puffs albuterol inhaler every 4 hours as needed for wheezing and tightness in chest   Cold/cough medication as needed  Tylenol, or ibuprofen (Advil/Motrin) as needed  Gargle and swish mouth with warm salt water or mouthwash  Recheck/follow-up with family physician  Please go to the hospital emergency department if needed

## 2018-12-28 NOTE — PROGRESS NOTES
[facility]        NAME: Mary Corbett is a 50 y o  female  : 1970    MRN: [id number]  DATE: 2018  TIME: 10:20 AM    Assessment and Plan   Acute upper respiratory infection [J06 9]  1  Acute upper respiratory infection     2  Acute non-recurrent pansinusitis  amoxicillin-clavulanate (AUGMENTIN) 875-125 mg per tablet   3  Mild intermittent asthmatic bronchitis with acute exacerbation  albuterol (VENTOLIN HFA) 90 mcg/act inhaler         Patient Instructions     Patient Instructions   Rest, limit activity  Augmentin twice a day until finished (please take probiotics)  Two puffs albuterol inhaler every 4 hours as needed for wheezing and tightness in chest   Cold/cough medication as needed  Tylenol, or ibuprofen (Advil/Motrin) as needed  Gargle and swish mouth with warm salt water or mouthwash  Recheck/follow-up with family physician  Please go to the hospital emergency department if needed  Follow up with PCP in 3-5 days  Proceed to  ER if symptoms worsen  Chief Complaint     Chief Complaint   Patient presents with    Cold Like Symptoms     x , cough, reports of being seen at [facility]  for same complaints   Earache    Sore Throat    Shortness of Breath     x today         History of Present Illness       Congestion, cough, sore throat, ear discomfort, sinus pressure, shortness of breath (patient states she is using her albuterol inhaler)        Review of Systems   Review of Systems   HENT: Positive for congestion, ear pain, sinus pressure and sore throat  Respiratory: Positive for cough and shortness of breath  Cardiovascular: Negative  Musculoskeletal: Negative  Skin: Negative  Neurological: Negative            Current Medications       Current Outpatient Prescriptions:     acetaminophen (TYLENOL) 650 mg CR tablet, Take 650 mg by mouth every 8 (eight) hours as needed, Disp: , Rfl:     albuterol (VENTOLIN HFA) 90 mcg/act inhaler, Inhale 2 puffs every 4 (four) hours as needed for wheezing or shortness of breath, Disp: 18 g, Rfl: 0    amoxicillin-clavulanate (AUGMENTIN) 875-125 mg per tablet, Take 1 tablet by mouth every 12 (twelve) hours for 20 doses, Disp: 20 tablet, Rfl: 0    atorvastatin (LIPITOR) 40 mg tablet, Take 1 tablet (40 mg total) by mouth daily, Disp: 30 tablet, Rfl: 5    ciprofloxacin-dexamethasone (CIPRODEX) otic suspension, Administer 4 drops to the right ear 2 (two) times a day for 7 days, Disp: 7 5 mL, Rfl: 0    fexofenadine (ALLEGRA) 60 MG tablet, Take 1 tablet (60 mg total) by mouth daily (Patient not taking: Reported on 12/28/2018 ), Disp: 60 tablet, Rfl: 1    fluticasone (FLONASE) 50 mcg/act nasal spray, 2 sprays into each nostril daily (Patient taking differently: 2 sprays into each nostril as needed  ), Disp: 16 g, Rfl: 0    hydrochlorothiazide (HYDRODIURIL) 12 5 mg tablet, Take 1 tablet (12 5 mg total) by mouth daily, Disp: 90 tablet, Rfl: 3    ibuprofen (MOTRIN) 600 mg tablet, Take 1 tablet (600 mg total) by mouth every 6 (six) hours as needed for mild pain (Patient not taking: Reported on 12/28/2018 ), Disp: 30 tablet, Rfl: 0    PROAIR  (90 Base) MCG/ACT inhaler, Inhale 2 puffs every 6 (six) hours as needed, Disp: , Rfl: 0    varenicline (CHANTIX RAMESH) 0 5 MG X 11 & 1 MG X 42 tablet, Take by mouth 2 (two) times a day Take one 0 5mg tab by mouth 1x daily for 3 days, then increase to one 0 5mg tab 2x daily for 3 days, then increase to one 1mg tab 2x daily, Disp: , Rfl:     Current Allergies     Allergies as of 12/28/2018 - Reviewed 12/28/2018   Allergen Reaction Noted    Bee pollen  12/13/2012    Pollen extract Allergic Rhinitis and Sneezing 12/13/2012            The following portions of the patient's history were reviewed and updated as appropriate: allergies, current medications, past family history, past medical history, past social history, past surgical history and problem list      Past Medical History: Diagnosis Date    Discharge from right nipple     last assessed 03/20/2013    Hyperlipidemia     Hypertension        Past Surgical History:   Procedure Laterality Date    CARPAL TUNNEL RELEASE      MT HYSTEROSCOPY,W/ENDO BX N/A 11/20/2018    Procedure: DILATATION AND CURETTAGE (D&C) WITH HYSTEROSCOPY;  Surgeon: Pastor Pati DO;  [facility];  Service: Gynecology    TUBAL LIGATION      WRIST SURGERY Bilateral        Family History   Problem Relation Age of Onset    Heart attack Mother     Diabetes Father     Hypertension Father     Hyperlipidemia Father     Breast cancer Maternal Grandmother          Medications have been verified  Objective   /76   Pulse 90   Temp (!) 97 °F (36 1 °C) (Temporal)   Resp 18   Ht 5' 3" (1 6 m)   Wt 88 5 kg (195 lb)   LMP 01/01/2016   SpO2 97%   BMI 34 54 kg/m²        Physical Exam     Physical Exam   Constitutional: She is oriented to person, place, and time  She appears well-developed and well-nourished  Patient appears ill   HENT:   Nasal congestion; discomfort over the sinuses; slight retraction of the eardrums; injection of the oropharynx   Neck: Normal range of motion  Neck supple  Pulmonary/Chest: Effort normal  No respiratory distress  She has no wheezes  Coarse breath sounds   Neurological: She is alert and oriented to person, place, and time  No nuchal rigidity   Skin: Skin is warm  There is pallor  Fair turgor   Psychiatric: She has a normal mood and affect  Her behavior is normal    Nursing note and vitals reviewed

## 2018-12-28 NOTE — LETTER
December 28, 2018     Patient: Jasmin Jurado   YOB: 1970   Date of Visit: 12/28/2018       To Whom It May Concern: It is my medical opinion that Jasmin Jurado may return to work on 12/31/2018  If you have any questions or concerns, please don't hesitate to call           Sincerely,        Sugey Gutierrez DO    CC: No Recipients

## 2019-01-03 ENCOUNTER — OFFICE VISIT (OUTPATIENT)
Dept: URGENT CARE | Age: 49
End: 2019-01-03
Payer: COMMERCIAL

## 2019-01-03 VITALS
BODY MASS INDEX: 33.46 KG/M2 | WEIGHT: 196 LBS | DIASTOLIC BLOOD PRESSURE: 80 MMHG | HEART RATE: 87 BPM | SYSTOLIC BLOOD PRESSURE: 136 MMHG | HEIGHT: 64 IN | RESPIRATION RATE: 18 BRPM | OXYGEN SATURATION: 97 % | TEMPERATURE: 99.8 F

## 2019-01-03 DIAGNOSIS — H66.90 ACUTE OTITIS MEDIA, UNSPECIFIED OTITIS MEDIA TYPE: ICD-10-CM

## 2019-01-03 DIAGNOSIS — J01.00 ACUTE MAXILLARY SINUSITIS, RECURRENCE NOT SPECIFIED: Primary | ICD-10-CM

## 2019-01-03 PROCEDURE — 99213 OFFICE O/P EST LOW 20 MIN: CPT | Performed by: FAMILY MEDICINE

## 2019-01-03 NOTE — LETTER
January 3, 2019     Patient: Robert Alexis   YOB: 1970   Date of Visit: 1/3/2019       To Whom It May Concern: It is my medical opinion that Robert Alexis may return to work on 1/5/19  If you have any questions or concerns, please don't hesitate to call           Sincerely,        Char Hooper PA-C    CC: No Recipients

## 2019-01-04 NOTE — PATIENT INSTRUCTIONS
Continue antibiotics as prescribed  Use Flonase as prescribed  Stay hydrated with lots of water/fluids  Follow-up with PCP in the next few days for reexamination and to ensure resolution of symptoms  Go to the ED if any intractable fevers, unable to stay hydrated, abdominal pain, chest pain, shortness of breath, new or worsening symptoms or other concerning symptoms

## 2019-01-04 NOTE — PROGRESS NOTES
[facility]        NAME: Lucius Hobbs is a 50 y o  female  : 1970    MRN: [id number]  DATE: January 3, 2019  TIME: 8:00 PM    Assessment and Plan   Acute maxillary sinusitis, recurrence not specified [J01 00]  1  Acute maxillary sinusitis, recurrence not specified     2  Acute otitis media, unspecified otitis media type       Patient is not failed her current antibiotic regimen so do not want to change antibiotics at this time  Patient states she is here for a work note  Discussed PCP follow-up or patient verbalized good understanding to go to the ER if any worsening or new symptoms  Patient Instructions     Continue antibiotics as prescribed  Use Flonase as prescribed  Stay hydrated with lots of water/fluids  Follow-up with PCP in the next few days for reexamination and to ensure resolution of symptoms  Go to the ED if any intractable fevers, unable to stay hydrated, abdominal pain, chest pain, shortness of breath, new or worsening symptoms or other concerning symptoms  Chief Complaint     Chief Complaint   Patient presents with    Cough     FEELS HOT/COLD, LEFT EAR PAIN AND HEAD CONGESTION IT'S BEEN FOR 3 WEEKS  PATIENT STATES  SHE GOT WORSE AGAIN TODAY   Earache         History of Present Illness       70-year-old female presents with continued left ear pain, sinus pressure and mild productive cough since she was seen on   States she has been taking her Augmentin and still has about 4 days left but states her symptoms have not resolved  Also states she has been taking the Flonase with little relief  Patient denies any worsening of symptoms  States he did not feel good enough to go back to work so she needs another work note  She denies any chest pain, shortness of breath, GI/ symptoms, headaches or vision changes or other complaints  Review of Systems   Review of Systems   Constitutional: Negative for chills and fever     HENT: Positive for congestion, rhinorrhea, sinus pressure and sore throat  Negative for ear pain, facial swelling, trouble swallowing and voice change  Eyes: Negative for discharge, itching and visual disturbance  Respiratory: Positive for cough  Negative for chest tightness, shortness of breath and wheezing  Cardiovascular: Negative for chest pain  Gastrointestinal: Negative for abdominal pain, diarrhea, nausea and vomiting  Musculoskeletal: Negative for back pain and neck pain  Skin: Negative for rash  Neurological: Negative for dizziness, syncope, weakness, numbness and headaches  All other systems reviewed and are negative          Current Medications       Current Outpatient Prescriptions:     acetaminophen (TYLENOL) 650 mg CR tablet, Take 650 mg by mouth every 8 (eight) hours as needed, Disp: , Rfl:     albuterol (VENTOLIN HFA) 90 mcg/act inhaler, Inhale 2 puffs every 4 (four) hours as needed for wheezing or shortness of breath, Disp: 18 g, Rfl: 0    amoxicillin-clavulanate (AUGMENTIN) 875-125 mg per tablet, Take 1 tablet by mouth every 12 (twelve) hours for 20 doses, Disp: 20 tablet, Rfl: 0    atorvastatin (LIPITOR) 40 mg tablet, Take 1 tablet (40 mg total) by mouth daily, Disp: 30 tablet, Rfl: 5    fluticasone (FLONASE) 50 mcg/act nasal spray, 2 sprays into each nostril daily (Patient taking differently: 2 sprays into each nostril as needed  ), Disp: 16 g, Rfl: 0    hydrochlorothiazide (HYDRODIURIL) 12 5 mg tablet, Take 1 tablet (12 5 mg total) by mouth daily, Disp: 90 tablet, Rfl: 3    ibuprofen (MOTRIN) 600 mg tablet, Take 1 tablet (600 mg total) by mouth every 6 (six) hours as needed for mild pain, Disp: 30 tablet, Rfl: 0    PROAIR  (90 Base) MCG/ACT inhaler, Inhale 2 puffs every 6 (six) hours as needed, Disp: , Rfl: 0    varenicline (CHANTIX RAMESH) 0 5 MG X 11 & 1 MG X 42 tablet, Take by mouth 2 (two) times a day Take one 0 5mg tab by mouth 1x daily for 3 days, then increase to one 0 5mg tab 2x daily for 3 days, then increase to one 1mg tab 2x daily, Disp: , Rfl:     ciprofloxacin-dexamethasone (CIPRODEX) otic suspension, Administer 4 drops to the right ear 2 (two) times a day for 7 days, Disp: 7 5 mL, Rfl: 0    fexofenadine (ALLEGRA) 60 MG tablet, Take 1 tablet (60 mg total) by mouth daily (Patient not taking: Reported on 12/28/2018 ), Disp: 60 tablet, Rfl: 1    Current Allergies     Allergies as of 01/03/2019 - Reviewed 01/03/2019   Allergen Reaction Noted    Bee pollen  12/13/2012    Pollen extract Allergic Rhinitis and Sneezing 12/13/2012            The following portions of the patient's history were reviewed and updated as appropriate: allergies, current medications, past family history, past medical history, past social history, past surgical history and problem list      Past Medical History:   Diagnosis Date    Discharge from right nipple     last assessed 03/20/2013    Hyperlipidemia     Hypertension        Past Surgical History:   Procedure Laterality Date    CARPAL TUNNEL RELEASE      [place] HYSTEROSCOPY,W/ENDO BX N/A 11/20/2018    Procedure: DILATATION AND CURETTAGE (D&C) WITH HYSTEROSCOPY;  Surgeon: Malou Barber DO;  [facility];  Service: Gynecology    TUBAL LIGATION      WRIST SURGERY Bilateral        Family History   Problem Relation Age of Onset    Heart attack Mother     Diabetes Father     Hypertension Father     Hyperlipidemia Father     Breast cancer Maternal Grandmother          Medications have been verified  Objective   /80   Pulse 87   Temp 99 8 °F (37 7 °C) (Temporal)   Resp 18   Ht 5' 4" (1 626 m)   Wt 88 9 kg (196 lb)   LMP 01/01/2016   SpO2 97%   BMI 33 64 kg/m²        Physical Exam     Physical Exam   Constitutional: She is oriented to person, place, and time  She appears well-developed and well-nourished  No distress  HENT:   Head: Normocephalic and atraumatic     Right Ear: Tympanic membrane normal    Left Ear: Tympanic membrane is erythematous and bulging  PND present with mildly erythematous posterior pharynx  Mild bilateral sinus pressure/discomfort to palpation   Airway patent, handling secretions  Eyes: Pupils are equal, round, and reactive to light  Conjunctivae are normal    Neck: Normal range of motion  Neck supple  Cardiovascular: Normal rate, regular rhythm and normal heart sounds  Pulmonary/Chest: Effort normal and breath sounds normal  She has no wheezes  Neurological: She is alert and oriented to person, place, and time  Skin: Skin is warm and dry  Psychiatric: She has a normal mood and affect  Her behavior is normal    Nursing note and vitals reviewed

## 2019-01-09 ENCOUNTER — OFFICE VISIT (OUTPATIENT)
Dept: FAMILY MEDICINE CLINIC | Facility: CLINIC | Age: 49
End: 2019-01-09

## 2019-01-09 VITALS
TEMPERATURE: 97.1 F | HEART RATE: 80 BPM | SYSTOLIC BLOOD PRESSURE: 120 MMHG | RESPIRATION RATE: 18 BRPM | BODY MASS INDEX: 33.67 KG/M2 | HEIGHT: 64 IN | WEIGHT: 197.2 LBS | DIASTOLIC BLOOD PRESSURE: 80 MMHG

## 2019-01-09 DIAGNOSIS — F17.210 CIGARETTE NICOTINE DEPENDENCE WITHOUT COMPLICATION: Chronic | ICD-10-CM

## 2019-01-09 DIAGNOSIS — H60.502 ACUTE OTITIS EXTERNA OF LEFT EAR, UNSPECIFIED TYPE: Primary | ICD-10-CM

## 2019-01-09 PROCEDURE — 99213 OFFICE O/P EST LOW 20 MIN: CPT | Performed by: FAMILY MEDICINE

## 2019-01-09 RX ORDER — AMOXICILLIN AND CLAVULANATE POTASSIUM 875; 125 MG/1; MG/1
1 TABLET, FILM COATED ORAL EVERY 12 HOURS SCHEDULED
Qty: 14 TABLET | Refills: 0 | Status: SHIPPED | OUTPATIENT
Start: 2019-01-09 | End: 2019-01-16

## 2019-01-09 RX ORDER — VARENICLINE TARTRATE 25 MG
KIT ORAL
Qty: 53 TABLET | Refills: 0 | Status: SHIPPED | OUTPATIENT
Start: 2019-01-09 | End: 2019-01-15 | Stop reason: SDUPTHER

## 2019-01-09 RX ORDER — VARENICLINE TARTRATE 25 MG
KIT ORAL 2 TIMES DAILY
Qty: 53 TABLET | Refills: 0 | Status: CANCELLED | OUTPATIENT
Start: 2019-01-09

## 2019-01-09 NOTE — PROGRESS NOTES
[address]    NAME: Suman Stanley  AGE: 50 y o  SEX: female    DATE OF ENCOUNTER: 1/9/2019    Assessment and Plan     Problem List Items Addressed This Visit        Nervous and Auditory    Acute otitis externa of left ear - Primary     Patient seen in urgent care for recurrent URI and left ear otitis, continues to have sinus pain/pressure and pain/pressure in left ear   Start Augmentin for persistent URI         Relevant Medications    amoxicillin-clavulanate (AUGMENTIN) 875-125 mg per tablet       Other    Dependence on nicotine from cigarettes (Chronic)     Patient has restarted smoking after quitting for six months, has previously used Chantix with success and would like to use again, will start dose today         Relevant Medications    varenicline (CHANTIX RAMESH) 0 5 MG X 11 & 1 MG X 42 tablet      - Counseling Documentation: patient was counseled regarding: diagnostic results, instructions for management, risk factor reductions, prognosis, patient and family education, impressions, risks and benefits of treatment options and importance of compliance with treatment    Chief Complaint     Chief Complaint   Patient presents with    Follow-up     ER for ear infection and bronchitis     History of Present Illness     Maryam Javed is here today for evaluation of continued sinus pain and pressure as well as congestion with left ear pain  This has been persistent for the past two weeks and is not improving off of antibiotics  She has sick contacts including her grandson who is hospitalized with URI/influenza  She denies fever or chills, body or muscle aches or other symptoms consistent with influenza  She has recently been treated with Ciprodex ear drops for an ear infection and has not had resolution of the pain and pressure in her left ear with the use of drops  Ear Fullness    There is pain in the left ear  This is a recurrent problem   The current episode started 1 to 4 weeks ago  The problem occurs constantly  The problem has been unchanged  There has been no fever  The pain is moderate  Associated symptoms include hearing loss (muffled hearing left)  Pertinent negatives include no coughing or sore throat  She has tried ear drops for the symptoms  The treatment provided no relief  The following portions of the patient's history were reviewed and updated as appropriate: allergies, current medications, past family history, past medical history, past social history, past surgical history and problem list     Review of Systems     Review of Systems   Constitutional: Negative for activity change, appetite change, chills, fatigue and fever  HENT: Positive for congestion, ear pain, hearing loss (muffled hearing left), sinus pain and sinus pressure  Negative for postnasal drip, sore throat, trouble swallowing and voice change  Eyes: Negative  Respiratory: Negative for cough, shortness of breath and wheezing  Cardiovascular: Negative for chest pain and palpitations  Gastrointestinal: Negative  Endocrine: Negative  Musculoskeletal: Negative  Psychiatric/Behavioral: Negative  All other systems reviewed and are negative  Active Problem List     Patient Active Problem List   Diagnosis    Postmenopausal bleeding    Loose stools    Allergic rhinitis    Smoking    Essential hypertension    Dyslipidemia    Obesity (BMI 30-39  9)    Prediabetes    Anxiety disorder    IBS (irritable bowel syndrome)    Other fatigue    Otitis externa of right ear    Need for influenza vaccination    Acute otitis externa of left ear    Dependence on nicotine from cigarettes       Objective     /80   Pulse 80   Temp (!) 97 1 °F (36 2 °C)   Resp 18   Ht 5' 4" (1 626 m)   Wt 89 4 kg (197 lb 3 2 oz)   LMP 01/01/2016   BMI 33 85 kg/m²     Physical Exam   Constitutional: She is oriented to person, place, and time   She appears well-developed and well-nourished  No distress  Appears stated age, NAD   HENT:   Head: Normocephalic and atraumatic  Right Ear: Hearing, external ear and ear canal normal  Tympanic membrane is perforated  Left Ear: External ear normal    Mouth/Throat: Oropharynx is clear and moist  No oropharyngeal exudate  Left ear erythema of canal with cerumen impaction, pain on evaluation and with otoscopic exam, no periauricular lymph node swelling noted   Neck: Normal range of motion  Neck supple  No tracheal deviation present  No thyromegaly present  Cardiovascular: Normal rate, regular rhythm, normal heart sounds and intact distal pulses  Exam reveals no gallop and no friction rub  No murmur heard  Pulmonary/Chest: Breath sounds normal  No respiratory distress  She has no wheezes  Abdominal: Soft  Bowel sounds are normal  She exhibits no distension  Musculoskeletal: Normal range of motion  Neurological: She is oriented to person, place, and time  Skin: Skin is warm and dry  She is not diaphoretic  Psychiatric: She has a normal mood and affect  Nursing note and vitals reviewed      Pertinent Laboratory/Diagnostic Studies:  CBC:   Results from last 6 Months  Lab Units 11/25/18 1934   WBC Thousand/uL 10 82*   RBC Million/uL 4 16   HEMOGLOBIN g/dL 12 2   HEMATOCRIT % 36 8   MCV fL 89   MCH pg 29 3   MCHC g/dL 33 2   RDW % 12 9   MPV fL 9 4   PLATELETS Thousands/uL 275   NRBC AUTO /100 WBCs 0   NEUTROS PCT % 60   LYMPHS PCT % 31   MONOS PCT % 6   EOS PCT % 3   BASOS PCT % 0   NEUTROS ABS Thousands/µL 6 33   LYMPHS ABS Thousands/µL 3 39   MONOS ABS Thousand/µL 0 69   EOS ABS Thousand/µL 0 34     Chemistry Profile:   Results from last 6 Months  Lab Units 11/25/18 1934 07/31/18  1102   POTASSIUM mmol/L 3 4* 3 8   CHLORIDE mmol/L 102 106   CO2 mmol/L 31 26   BUN mg/dL 18 16   CREATININE mg/dL 0 84 0 62   GLUCOSE FASTING mg/dL  --  90   GLUCOSE RANDOM mg/dL 99  --    CALCIUM mg/dL 8 6 8 6   AST U/L 19 9   ALT U/L 33 15   ALK PHOS U/L 130* 101   EGFR ml/min/1 73sq m 82 107       Current Medications     Current Outpatient Prescriptions:     acetaminophen (TYLENOL) 650 mg CR tablet, Take 650 mg by mouth every 8 (eight) hours as needed, Disp: , Rfl:     albuterol (VENTOLIN HFA) 90 mcg/act inhaler, Inhale 2 puffs every 4 (four) hours as needed for wheezing or shortness of breath, Disp: 18 g, Rfl: 0    atorvastatin (LIPITOR) 40 mg tablet, Take 1 tablet (40 mg total) by mouth daily, Disp: 30 tablet, Rfl: 5    ciprofloxacin-dexamethasone (CIPRODEX) otic suspension, Administer 4 drops to the right ear 2 (two) times a day for 7 days, Disp: 7 5 mL, Rfl: 0    fluticasone (FLONASE) 50 mcg/act nasal spray, 2 sprays into each nostril daily (Patient taking differently: 2 sprays into each nostril as needed  ), Disp: 16 g, Rfl: 0    hydrochlorothiazide (HYDRODIURIL) 12 5 mg tablet, Take 1 tablet (12 5 mg total) by mouth daily, Disp: 90 tablet, Rfl: 3    ibuprofen (MOTRIN) 600 mg tablet, Take 1 tablet (600 mg total) by mouth every 6 (six) hours as needed for mild pain, Disp: 30 tablet, Rfl: 0    PROAIR  (90 Base) MCG/ACT inhaler, Inhale 2 puffs every 6 (six) hours as needed, Disp: , Rfl: 0    varenicline (CHANTIX RAMESH) 0 5 MG X 11 & 1 MG X 42 tablet, Take by mouth 2 (two) times a day Take one 0 5mg tab by mouth 1x daily for 3 days, then increase to one 0 5mg tab 2x daily for 3 days, then increase to one 1mg tab 2x daily, Disp: , Rfl:     amoxicillin-clavulanate (AUGMENTIN) 875-125 mg per tablet, Take 1 tablet by mouth every 12 (twelve) hours for 7 days, Disp: 14 tablet, Rfl: 0    fexofenadine (ALLEGRA) 60 MG tablet, Take 1 tablet (60 mg total) by mouth daily (Patient not taking: Reported on 12/28/2018 ), Disp: 60 tablet, Rfl: 1    varenicline (CHANTIX RAMESH) 0 5 MG X 11 & 1 MG X 42 tablet, Take one 0 5mg tab by mouth 1x daily for 3 days, then increase to one 0 5mg tab 2x daily for 3 days, then increase to one 1mg tab 2x daily, Disp: 53 tablet, Rfl: 0    Health Maintenance     Health Maintenance   Topic Date Due    DTaP,Tdap,and Td Vaccines (1 - Tdap) 02/19/1991    PAP SMEAR  05/20/2019    Depression Screening PHQ  01/03/2020    MAMMOGRAM  04/10/2020    INFLUENZA VACCINE  Completed     Immunization History   Administered Date(s) Administered    Influenza TIV (IM) 09/28/2013    Influenza, injectable, quadrivalent, preservative free 0 5 mL 11/16/2018    Tuberculin Skin Test-PPD Intradermal 05/04/2018, 05/14/2018, 05/29/2018     Lyn GOLDBERG    Geriatrics Fellow   1/9/2019 8:57 AM

## 2019-01-09 NOTE — PATIENT INSTRUCTIONS
Please take the antibiotic two times daily for 7 days  If you develop fever or shaking chills or drainage from your left ear please call the office or go to the nearest emergency department  Please start the Chantix dose pack and call the office or 911 or go to nearest ED if you develop symptoms of worsening anxiety or wanting to hurt yourself or anyone else

## 2019-01-09 NOTE — ASSESSMENT & PLAN NOTE
Patient has restarted smoking after quitting for six months, has previously used Chantix with success and would like to use again, will start dose today

## 2019-01-09 NOTE — ASSESSMENT & PLAN NOTE
Patient seen in urgent care for recurrent URI and left ear otitis, continues to have sinus pain/pressure and pain/pressure in left ear   Start Augmentin for persistent URI

## 2019-01-15 ENCOUNTER — OFFICE VISIT (OUTPATIENT)
Dept: URGENT CARE | Age: 49
End: 2019-01-15
Payer: COMMERCIAL

## 2019-01-15 VITALS
TEMPERATURE: 97.8 F | HEIGHT: 63 IN | BODY MASS INDEX: 35.08 KG/M2 | OXYGEN SATURATION: 98 % | HEART RATE: 87 BPM | DIASTOLIC BLOOD PRESSURE: 55 MMHG | RESPIRATION RATE: 18 BRPM | WEIGHT: 198 LBS | SYSTOLIC BLOOD PRESSURE: 116 MMHG

## 2019-01-15 DIAGNOSIS — R30.0 DYSURIA: ICD-10-CM

## 2019-01-15 DIAGNOSIS — N76.0 ACUTE VAGINITIS: ICD-10-CM

## 2019-01-15 DIAGNOSIS — R35.0 URINARY FREQUENCY: Primary | ICD-10-CM

## 2019-01-15 LAB
SL AMB  POCT GLUCOSE, UA: NORMAL
SL AMB LEUKOCYTE ESTERASE,UA: NORMAL
SL AMB POCT BILIRUBIN,UA: NORMAL
SL AMB POCT BLOOD,UA: NORMAL
SL AMB POCT CLARITY,UA: NORMAL
SL AMB POCT COLOR,UA: YELLOW
SL AMB POCT KETONES,UA: NORMAL
SL AMB POCT NITRITE,UA: NORMAL
SL AMB POCT PH,UA: 5
SL AMB POCT SPECIFIC GRAVITY,UA: 1.03
SL AMB POCT URINE PROTEIN: NORMAL
SL AMB POCT UROBILINOGEN: 0.2

## 2019-01-15 PROCEDURE — 99213 OFFICE O/P EST LOW 20 MIN: CPT | Performed by: PHYSICIAN ASSISTANT

## 2019-01-15 PROCEDURE — 87086 URINE CULTURE/COLONY COUNT: CPT | Performed by: PHYSICIAN ASSISTANT

## 2019-01-15 RX ORDER — FLUCONAZOLE 150 MG/1
TABLET ORAL
Qty: 2 TABLET | Refills: 0 | Status: SHIPPED | OUTPATIENT
Start: 2019-01-15 | End: 2019-01-18 | Stop reason: SDUPTHER

## 2019-01-16 LAB
BACTERIA UR CULT: ABNORMAL
BACTERIA UR CULT: ABNORMAL

## 2019-01-16 NOTE — PATIENT INSTRUCTIONS
Take Diflucan as directed  If persistent symptoms, follow-up with your gynecologist for further testing

## 2019-01-16 NOTE — PROGRESS NOTES
[facility]    NAME: Heather Reina is a 50 y o  female  : 1970    MRN: [id number]  DATE: January 15, 2019  TIME: 7:34 PM    Assessment and Plan   Urinary frequency [R35 0]  1  Urinary frequency  POCT urine dip    Urine culture   2  Acute vaginitis  fluconazole (DIFLUCAN) 150 mg tablet   3  Dysuria         Patient Instructions     Patient Instructions   Take Diflucan as directed  If persistent symptoms, follow-up with your gynecologist for further testing  Chief Complaint     Chief Complaint   Patient presents with    Urinary Frequency     patients stated she is having yeast infection, she is having discharged color yellow, burning upon wipe, and itching it's been for 1 week   Abdominal Pain       History of Present Illness   Heather Reina presents to the clinic c/o  59-year-old female with lower abdominal pressure, burning on urination and frequency with vaginal discharge that is yellow and itchy  No gross odor  She has been on a couple rounds of antibiotics recently  Review of Systems   Review of Systems   Constitutional: Negative  Respiratory: Negative  Cardiovascular: Negative  Gastrointestinal: Positive for abdominal pain  Negative for blood in stool, constipation and diarrhea  Genitourinary: Positive for difficulty urinating, dysuria, frequency, urgency and vaginal discharge  Negative for hematuria         Current Medications     Long-Term Prescriptions   Medication Sig Dispense Refill    atorvastatin (LIPITOR) 40 mg tablet Take 1 tablet (40 mg total) by mouth daily 30 tablet 5    ciprofloxacin-dexamethasone (CIPRODEX) otic suspension Administer 4 drops to the right ear 2 (two) times a day for 7 days 7 5 mL 0    fexofenadine (ALLEGRA) 60 MG tablet Take 1 tablet (60 mg total) by mouth daily (Patient not taking: Reported on 2018 ) 60 tablet 1    fluticasone (FLONASE) 50 mcg/act nasal spray 2 sprays into each nostril daily (Patient taking differently: 2 sprays into each nostril as needed  ) 16 g 0    hydrochlorothiazide (HYDRODIURIL) 12 5 mg tablet Take 1 tablet (12 5 mg total) by mouth daily 90 tablet 3    ibuprofen (MOTRIN) 600 mg tablet Take 1 tablet (600 mg total) by mouth every 6 (six) hours as needed for mild pain (Patient not taking: Reported on 1/15/2019 ) 30 tablet 0    varenicline (CHANTIX RAMESH) 0 5 MG X 11 & 1 MG X 42 tablet Take by mouth 2 (two) times a day Take one 0 5mg tab by mouth 1x daily for 3 days, then increase to one 0 5mg tab 2x daily for 3 days, then increase to one 1mg tab 2x daily      [DISCONTINUED] varenicline (CHANTIX RAMESH) 0 5 MG X 11 & 1 MG X 42 tablet Take one 0 5mg tab by mouth 1x daily for 3 days, then increase to one 0 5mg tab 2x daily for 3 days, then increase to one 1mg tab 2x daily 53 tablet 0       Current Allergies     Allergies as of 01/15/2019 - Reviewed 01/15/2019   Allergen Reaction Noted    Bee pollen  12/13/2012    Pollen extract Allergic Rhinitis and Sneezing 12/13/2012          The following portions of the patient's history were reviewed and updated as appropriate: allergies, current medications, past family history, past medical history, past social history, past surgical history and problem list   Past Medical History:   Diagnosis Date    Discharge from right nipple     last assessed 03/20/2013    Hyperlipidemia     Hypertension      Past Surgical History:   Procedure Laterality Date    CARPAL TUNNEL RELEASE      CA HYSTEROSCOPY,W/ENDO BX N/A 11/20/2018    Procedure: DILATATION AND CURETTAGE (D&C) WITH HYSTEROSCOPY;  Surgeon: Sherrie Hampton DO;  Location: [facility];  Service: Gynecology    TUBAL LIGATION      WRIST SURGERY Bilateral      Family History   Problem Relation Age of Onset    Heart attack Mother     Diabetes Father     Hypertension Father     Hyperlipidemia Father     Breast cancer Maternal Grandmother        Objective   /55   Pulse 87   Temp 97 8 °F (36 6 °C) (Temporal)   Resp 18   Ht 5' 3" (1 6 m)   Wt 89 8 kg (198 lb)   LMP 01/01/2016   SpO2 98%   BMI 35 07 kg/m²   Patient's last menstrual period was 01/01/2016  Physical Exam     Physical Exam   Constitutional: She is oriented to person, place, and time  She appears well-developed and well-nourished  No distress  Eyes: Conjunctivae are normal  No scleral icterus  Neck: Normal range of motion  Neck supple  Cardiovascular: Normal rate and regular rhythm  Exam reveals no gallop and no friction rub  No murmur heard  Pulmonary/Chest: Effort normal and breath sounds normal  No respiratory distress  She has no wheezes  She has no rales  Abdominal: There is tenderness  Mild suprapubic TTP  No CVAT  Lymphadenopathy:     She has no cervical adenopathy  Neurological: She is alert and oriented to person, place, and time  Skin: Skin is warm and dry  She is not diaphoretic  Psychiatric: She has a normal mood and affect  Nursing note and vitals reviewed

## 2019-01-18 ENCOUNTER — OFFICE VISIT (OUTPATIENT)
Dept: FAMILY MEDICINE CLINIC | Facility: CLINIC | Age: 49
End: 2019-01-18

## 2019-01-18 VITALS
RESPIRATION RATE: 16 BRPM | TEMPERATURE: 98.1 F | SYSTOLIC BLOOD PRESSURE: 130 MMHG | HEIGHT: 63 IN | DIASTOLIC BLOOD PRESSURE: 80 MMHG | HEART RATE: 86 BPM | WEIGHT: 196 LBS | BODY MASS INDEX: 34.73 KG/M2

## 2019-01-18 DIAGNOSIS — N89.8 VAGINAL ITCHING: Primary | ICD-10-CM

## 2019-01-18 DIAGNOSIS — N76.0 ACUTE VAGINITIS: ICD-10-CM

## 2019-01-18 LAB
BACTERIA UR QL AUTO: ABNORMAL /HPF
BILIRUB UR QL STRIP: NEGATIVE
CLARITY UR: CLEAR
COLOR UR: YELLOW
GLUCOSE UR STRIP-MCNC: NEGATIVE MG/DL
HGB UR QL STRIP.AUTO: NEGATIVE
HYALINE CASTS #/AREA URNS LPF: ABNORMAL /LPF
KETONES UR STRIP-MCNC: NEGATIVE MG/DL
LEUKOCYTE ESTERASE UR QL STRIP: ABNORMAL
NITRITE UR QL STRIP: NEGATIVE
NON-SQ EPI CELLS URNS QL MICRO: ABNORMAL /HPF
PH UR STRIP.AUTO: 7 [PH] (ref 4.5–8)
PROT UR STRIP-MCNC: NEGATIVE MG/DL
RBC #/AREA URNS AUTO: ABNORMAL /HPF
SP GR UR STRIP.AUTO: 1.02 (ref 1–1.03)
UROBILINOGEN UR QL STRIP.AUTO: 0.2 E.U./DL
WBC #/AREA URNS AUTO: ABNORMAL /HPF

## 2019-01-18 PROCEDURE — 87510 GARDNER VAG DNA DIR PROBE: CPT | Performed by: FAMILY MEDICINE

## 2019-01-18 PROCEDURE — 81001 URINALYSIS AUTO W/SCOPE: CPT | Performed by: FAMILY MEDICINE

## 2019-01-18 PROCEDURE — 99213 OFFICE O/P EST LOW 20 MIN: CPT | Performed by: FAMILY MEDICINE

## 2019-01-18 PROCEDURE — 87480 CANDIDA DNA DIR PROBE: CPT | Performed by: FAMILY MEDICINE

## 2019-01-18 PROCEDURE — 87660 TRICHOMONAS VAGIN DIR PROBE: CPT | Performed by: FAMILY MEDICINE

## 2019-01-18 RX ORDER — FLUCONAZOLE 150 MG/1
TABLET ORAL
Qty: 2 TABLET | Refills: 0 | Status: SHIPPED | OUTPATIENT
Start: 2019-01-18 | End: 2019-01-25

## 2019-01-20 LAB
CANDIDA RRNA VAG QL PROBE: NEGATIVE
G VAGINALIS RRNA GENITAL QL PROBE: NEGATIVE
T VAGINALIS RRNA GENITAL QL PROBE: NEGATIVE

## 2019-01-20 NOTE — ASSESSMENT & PLAN NOTE
On purse speculum exam, thin white discharge was noted, microscopic examination did not show any yeast or clue cells  Swab sent for Affirm test   Will also send urine for formal microscopic examination with reflex to culture  In the meantime will give another dose of fluconazole to be taken after 72 hours from previous dose  Patient agreed with the plan

## 2019-01-20 NOTE — PROGRESS NOTES
Assessment/Plan     Vaginal itching  On purse speculum exam, thin white discharge was noted, microscopic examination did not show any yeast or clue cells  Swab sent for Affirm test   Will also send urine for formal microscopic examination with reflex to culture  In the meantime will give another dose of fluconazole to be taken after 72 hours from previous dose  Patient agreed with the plan  Diagnoses and all orders for this visit:    Vaginal itching  -     VAGINOSIS DNA PROBE (AFFIRM); Future  -     UA w Reflex to Microscopic w Reflex to Culture - Clinic Collect  -     VAGINOSIS DNA PROBE (AFFIRM)  -     Urine Microscopic    Acute vaginitis  -     fluconazole (DIFLUCAN) 150 mg tablet; Take 1 now and 1 after finish antibiotics         Subjective     Chief Complaint   Patient presents with    Follow-up     Possible UTI itchy in vaginal area, was given two pills from urgent care for yeast infection        51-year-old female presented to office for an acute visit with complaints of vaginal itching for 3 days  Patient states that she was treated with 17 days of Augmentin for acute pansinusitis and otitis media, when she was nearing the completion of her antibiotic course she started to have vaginal itching with whitish discharge, she again went to urgent care and was prescribed fluconazole for yeast infection  Urine was also sent for culture that also showed yeast   Patient states that fluconazole has not provided any relief to her  She took fluconazole yesterday  The following portions of the patient's history were reviewed and updated as appropriate: allergies, current medications, past family history, past medical history, past social history, past surgical history and problem list     Review of Systems   Constitutional: Negative for chills and fever  HENT: Negative for congestion  Respiratory: Negative for shortness of breath  Cardiovascular: Negative for chest pain     Gastrointestinal: Negative for diarrhea, nausea and vomiting  Genitourinary: Positive for vaginal discharge  Negative for difficulty urinating  Neurological: Negative for headaches  Objective     Vitals:Blood pressure 130/80, pulse 86, temperature 98 1 °F (36 7 °C), resp  rate 16, height 5' 3" (1 6 m), weight 88 9 kg (196 lb), last menstrual period 01/01/2016  Physical Exam:  Physical Exam   Constitutional: She is oriented to person, place, and time  She appears well-developed and well-nourished  HENT:   Head: Normocephalic and atraumatic  Mouth/Throat: Oropharynx is clear and moist    Eyes: Conjunctivae and EOM are normal    Neck: Normal range of motion  Neck supple  Cardiovascular: Normal rate, regular rhythm and normal heart sounds  Exam reveals no friction rub  No murmur heard  Pulmonary/Chest: Effort normal and breath sounds normal  No respiratory distress  She has no wheezes  She has no rales  Abdominal: Soft  Bowel sounds are normal  She exhibits no distension  There is no tenderness  Genitourinary: Vaginal discharge found  Genitourinary Comments: On inspection:  No lesions noted on external genitalia, no erythema  Per speculum examination: Thin White discharge noted, cervix multiparous   Musculoskeletal: Normal range of motion  Neurological: She is alert and oriented to person, place, and time  Skin: Skin is warm and dry

## 2019-01-24 ENCOUNTER — OFFICE VISIT (OUTPATIENT)
Dept: FAMILY MEDICINE CLINIC | Facility: CLINIC | Age: 49
End: 2019-01-24

## 2019-01-24 VITALS
HEIGHT: 63 IN | RESPIRATION RATE: 16 BRPM | SYSTOLIC BLOOD PRESSURE: 130 MMHG | TEMPERATURE: 101.8 F | DIASTOLIC BLOOD PRESSURE: 82 MMHG | BODY MASS INDEX: 34.05 KG/M2 | WEIGHT: 192.2 LBS | HEART RATE: 104 BPM

## 2019-01-24 DIAGNOSIS — J11.1 INFLUENZA: Primary | ICD-10-CM

## 2019-01-24 PROCEDURE — 99213 OFFICE O/P EST LOW 20 MIN: CPT | Performed by: FAMILY MEDICINE

## 2019-01-24 RX ORDER — SENNOSIDES 8.6 MG
650 CAPSULE ORAL EVERY 8 HOURS PRN
Qty: 30 TABLET | Refills: 1 | Status: SHIPPED | OUTPATIENT
Start: 2019-01-24 | End: 2019-12-10 | Stop reason: ALTCHOICE

## 2019-01-24 RX ORDER — OSELTAMIVIR PHOSPHATE 75 MG/1
75 CAPSULE ORAL 2 TIMES DAILY
Qty: 10 CAPSULE | Refills: 0 | Status: SHIPPED | OUTPATIENT
Start: 2019-01-24 | End: 2019-01-29

## 2019-01-24 RX ORDER — IBUPROFEN 600 MG/1
600 TABLET ORAL EVERY 6 HOURS PRN
Qty: 30 TABLET | Refills: 1 | Status: SHIPPED | OUTPATIENT
Start: 2019-01-24 | End: 2019-04-26 | Stop reason: ALTCHOICE

## 2019-01-24 NOTE — LETTER
January 24, 2019     Patient: Brunilda Nevarez   YOB: 1970   Date of Visit: 1/24/2019       To Whom it May Concern:    Brunilda Nevarez is under my professional care  She was seen in my office on 1/24/2019  She may return to work on Monday January 28 2019  Mayito Matamoros Please excuse for Thursday Jan 24 and Friday Jan 25  If you have any questions or concerns, please don't hesitate to call           Sincerely,          Neehmiah Ocampo MD        CC: No Recipients

## 2019-01-24 NOTE — PROGRESS NOTES
Assessment/Plan:     Diagnoses and all orders for this visit:    Influenza  -     oseltamivir (TAMIFLU) 75 mg capsule; Take 1 capsule (75 mg total) by mouth 2 (two) times a day for 5 days  -     acetaminophen (TYLENOL) 650 mg CR tablet; Take 1 tablet (650 mg total) by mouth every 8 (eight) hours as needed (fever)  -     ibuprofen (MOTRIN) 600 mg tablet; Take 1 tablet (600 mg total) by mouth every 6 (six) hours as needed for mild pain          Subjective:   Chief Complaint   Patient presents with    Cold Like Symptoms    Sore Throat    Fever        Patient ID: Johnnye Castleman is a 50 y o  female  HPI  Started having a sore throat 2 days ago  Fever and myalgias started last night  Grandson (who she has custody of) was admitted with influenza A earlier this month  Taking Ibuprofen  Last dose last night  The following portions of the patient's history were reviewed and updated as appropriate: allergies, current medications, past family history, past medical history, past social history, past surgical history and problem list     Review of Systems   Constitutional: Positive for activity change, appetite change, chills, diaphoresis, fatigue and fever  HENT: Positive for rhinorrhea  Negative for congestion, ear pain, sinus pain, sinus pressure and trouble swallowing  Eyes: Negative  Respiratory: Positive for cough  Negative for shortness of breath, wheezing and stridor  Cardiovascular: Negative for chest pain  Gastrointestinal: Negative for constipation, diarrhea, nausea and vomiting  Musculoskeletal: Positive for myalgias  Skin: Negative  Neurological: Positive for headaches  Objective:      /82   Pulse 104   Temp (!) 101 8 °F (38 8 °C)   Resp 16   Ht 5' 3" (1 6 m)   Wt 87 2 kg (192 lb 3 2 oz)   LMP 01/01/2016   BMI 34 05 kg/m²          Physical Exam   Constitutional: She is oriented to person, place, and time  She appears well-developed  No distress     Ill-appearing, NAD    HENT:   Head: Normocephalic and atraumatic  Right Ear: External ear normal    Left Ear: External ear normal    Mouth/Throat: Oropharyngeal exudate present  Eyes: Conjunctivae and EOM are normal    Neck: Normal range of motion  Neck supple  Cardiovascular: Normal rate, regular rhythm and normal heart sounds  Pulmonary/Chest: Effort normal and breath sounds normal    Abdominal: Soft  Bowel sounds are normal    Lymphadenopathy:     She has no cervical adenopathy  Neurological: She is alert and oriented to person, place, and time  Skin: No rash noted  She is diaphoretic  Vitals reviewed

## 2019-01-26 ENCOUNTER — TELEPHONE (OUTPATIENT)
Dept: OTHER | Facility: OTHER | Age: 49
End: 2019-01-26

## 2019-01-26 ENCOUNTER — OFFICE VISIT (OUTPATIENT)
Dept: URGENT CARE | Age: 49
End: 2019-01-26
Payer: COMMERCIAL

## 2019-01-26 VITALS
WEIGHT: 195 LBS | BODY MASS INDEX: 34.55 KG/M2 | SYSTOLIC BLOOD PRESSURE: 118 MMHG | RESPIRATION RATE: 18 BRPM | HEART RATE: 86 BPM | DIASTOLIC BLOOD PRESSURE: 72 MMHG | HEIGHT: 63 IN | TEMPERATURE: 97.6 F | OXYGEN SATURATION: 96 %

## 2019-01-26 DIAGNOSIS — J02.9 ACUTE VIRAL PHARYNGITIS: Primary | ICD-10-CM

## 2019-01-26 LAB — S PYO AG THROAT QL: NEGATIVE

## 2019-01-26 PROCEDURE — 87070 CULTURE OTHR SPECIMN AEROBIC: CPT | Performed by: PHYSICIAN ASSISTANT

## 2019-01-26 PROCEDURE — 87430 STREP A AG IA: CPT | Performed by: FAMILY MEDICINE

## 2019-01-26 PROCEDURE — 99213 OFFICE O/P EST LOW 20 MIN: CPT | Performed by: FAMILY MEDICINE

## 2019-01-26 NOTE — TELEPHONE ENCOUNTER
Rashard Doherty 1970  CONFIDENTIALTY NOTICE: This fax transmission is intended only for the addressee  It contains information that is legally privileged,  confidential or otherwise protected from use or disclosure  If you are not the intended recipient, you are strictly prohibited from reviewing,  disclosing, copying using or disseminating any of this information or taking any action in reliance on or regarding this information  If you have  received this fax in error, please notify us immediately by telephone so that we can arrange for its return to us  Page:  3  Call Id: [id number]  Health Call  Standard Call Report  Health Call  Patient Name: Rashard Doherty  Gender: Female  : 1970  Age: 50 Y 6 M 7 D  Return Phone  Number: [phone] (Home)  Address: [address]  City/State/Zip: [address] [place] [address]  Practice Name: [address]  Practice Charged:  Physician:  [facility] Name:  Relationship To  Patient: Self  Return Phone Number: [phone] (Home)  Presenting Problem: " I have a sore throat with white  spots "  Service Type: Triage  Charged Service 1: Toya Chan U  38  Name and  Number:  Nurse Assessment  Nurse: Nora Ortega Date/Time: 2019 10:22:28 AM  Type of assessment required:  ---General (Adult or Child)  Duration of Current S/S  ---  Location/Radiation  ---Throat  Temperature (F) and route:  ---"My fever broke "  Symptom Specific Meds (Dose/Time):  ---Ibuprofen @ 0900  Other S/S  ---Not able to drink much because of throat pain  Back of throat has white spots noted  Ibuprofen not improving pain  Coughing up thick green mucous  Pain Scale on scale of 1-10, 10 being the worst:  ---4 / 10 but bookends of the day are worse    Symptom progression:  ---worse  Intake and Output  ---Decreased/ WNL  LMP/ Pregnancy:  ---2016/ Denies  Breastfeeding  ---No  Last Exam/Treatment:  --- - Diagnosed with the flu   Protocols  Protocol Title Nurse Date/Time  Sore Throat [place] 1/26/2019 10:28:17 AM  Question Caller Affirmed  Disp  Time Disposition Final User  1/26/2019 10:33:09 AM See Physician within 24 Hours Yes MALVIN Vega, [facility] Advice Given Per Protocol  SEE PHYSICIAN WITHIN 24 HOURS: * IF OFFICE WILL BE CLOSED AND NO PCP TRIAGE: You need to be seen within the  next 24 hours  An urgent care center is often a good source of care if your doctor's office is closed  SORE THROAT - For relief of  sore throat: * Sip warm chicken broth or apple juice  * Suck on hard candy or a throat lozenge (OTC)  * Gargle with warm salt water  four times a day  To make salt water, put 1/2 teaspoon of salt in 8 oz (240 ml) of warm water  * Avoid cigarette smoke  PAIN OR  FEVER MEDICINES: * For pain and fever relief, take acetaminophen or ibuprofen  * Treat fevers above 101° F (38 3° C)  * The goal  of fever therapy is to bring the fever down to a comfortable level  Remember that fever medicine usually lowers fever 2-3° F (1-1 5° C)  ACETAMINOPHEN (E G , TYLENOL): * Take 650 mg (two 325 mg pills) by mouth every 4-6 hours as needed  Each Regular Strength  Tylenol pill has 325 mg of acetaminophen  The most you should take each day is 3,250 mg (10 Regular Strength pills a day)  * Another  choice is to take 1,000 mg (two 500 mg pills) every 8 hours as needed  Each Extra Strength Tylenol pill has 500 mg of acetaminophen  The most you should take each day is 3,000 mg (6 Extra Strength pills a day)  IBUPROFEN (E G , MOTRIN, ADVIL): * Take 400  mg (two 200 mg pills) by mouth every 6 hours as needed  * Another choice is to take 600 mg (three 200 mg pills) by mouth every 8  hours as needed  * The most you should take each day is 1,200 mg (six 200 mg pills a day), unless your doctor has told you to take  more  NAPROXEN (E G , ALEVE): * Take 220 mg (one 220 mg pill) by mouth every 8 hours as needed   You may take 440 mg (two  220 mg pills) for your first dose  * The most you should take each day is 660 mg (three 220 mg pills a day), unless your doctor has told  you to take more  EXTRA NOTES: * Acetaminophen is thought to be safer than ibuprofen or naproxen for people over 72years old  Acetaminophen is in many OTC and prescription medicines  It might be in more than one medicine that you are taking  You need to be  careful and not take an overdose  An acetaminophen overdose can hurt the liver  [employer], the company that makes Tylenol, has different  dosage instructions for Tylenol in [place] and the [facility]  In [place] the maximum recommended dose per day is 4,000 mg or  twelve (12) Regular-Strength (325 mg) pills  In the [place], [employer] recommends a maximum dose of ten (10) Regular-Strength  (325 mg) pills  * Before taking any medicine, read all the instructions on the package  CAUTION - NSAIDS (E G , IBUPROFEN,  NAPROXEN): * Do not take nonsteroidal anti-inflammatory drugs (NSAIDs) if you have stomach problems, kidney disease, heart  failure, or other contraindications to using this type of medication  * Do not take NSAID medications for over 7 days without consulting  your PCP  * Do not take NSAID medications if you are pregnant  * You may take this medicine with or without food  Taking it with food  or milk may lessen the chance the drug will upset your stomach  * GASTROINTESTINAL RISK: There is an increased risk of stomach  ulcers, GI bleeding, perforation  * CARDIOVASCULAR RISK: There may be an increased risk of heart attack and stroke  SOFT DIET:  * Eat a soft diet  * Cold drinks, popsicles, and milk shakes are especially good  Avoid citrus fruits  DRINK PLENTY LIQUIDS: * Drink  plenty of liquids  This is important to prevent dehydration  * A healthy adult should drink 8 cups (240 ml) or more of liquid each day   *  Jasmin Jurado 1970  CONFIDENTIALTY NOTICE: This fax transmission is intended only for the addressee  It contains information that is legally privileged,  confidential or otherwise protected from use or disclosure  If you are not the intended recipient, you are strictly prohibited from reviewing,  disclosing, copying using or disseminating any of this information or taking any action in reliance on or regarding this information  If you have  received this fax in error, please notify us immediately by telephone so that we can arrange for its return to us  Page: 3 of 3  Call Id: [place] [address] Advice Given Per Protocol  How can you tell if you are drinking enough liquids? The goal is to keep the urine clear or light-yellow in color  If your urine is bright  yellow or dark yellow, you are probably not drinking enough liquids  * Caution: Some medical problems require fluid restriction  CALL  BACK IF: * You become worse  CARE ADVICE given per Sore Throat (Adult) guideline    Caller Understands: Yes  Caller Disagree/Comply: Comply  PreDisposition: Home Care

## 2019-01-26 NOTE — PATIENT INSTRUCTIONS
Use the Magic mouthwash as directed    Follow-up with your primary care physician if symptoms are persisting  Your rapid strep test was negative  No antibiotic indicated at this time  Throat swab will be sent for definitive culture  Results take approximately 48-72 hours to return  If you have not heard from the provider by the end of 3 business days, please call phone number at top of clinical summary to request the results  In the meantime you may do warm salt water gargles, over-the-counter medications and throat lozenges as needed

## 2019-01-26 NOTE — PROGRESS NOTES
[employer] [facility]        NAME: Ever De La Garza is a 50 y o  female  : 1970    MRN: [id number]  DATE: 2019  TIME: 2:16 PM    Assessment and Plan   Acute viral pharyngitis [J02 8, B97 89]  1  Acute viral pharyngitis  POCT rapid strepA    al mag oxide-diphenhydramine-lidocaine viscous (MAGIC MOUTHWASH) 1:1:1 suspension    Throat culture    Throat culture         Patient Instructions       Follow up with PCP in 3-5 days  Proceed to  ER if symptoms worsen  Chief Complaint     Chief Complaint   Patient presents with    Sore Throat     pt states she was seen thurs by PCP and dx flu, has had sore throat with white spots since  History of Present Illness       Patient for evaluation of persistent sore throat  Patient was seen by her primary care physician and diagnosed with influenza and is on Tamiflu  She states her throat is continued to be sore and she noted small ulcers on the inner part of her lip  She states she does feel a little better since being on the Tamiflu          Review of Systems   Review of Systems      Current Medications       Current Outpatient Prescriptions:     acetaminophen (TYLENOL) 650 mg CR tablet, Take 1 tablet (650 mg total) by mouth every 8 (eight) hours as needed (fever), Disp: 30 tablet, Rfl: 1    albuterol (VENTOLIN HFA) 90 mcg/act inhaler, Inhale 2 puffs every 4 (four) hours as needed for wheezing or shortness of breath, Disp: 18 g, Rfl: 0    atorvastatin (LIPITOR) 40 mg tablet, Take 1 tablet (40 mg total) by mouth daily, Disp: 30 tablet, Rfl: 5    fexofenadine (ALLEGRA) 60 MG tablet, Take 1 tablet (60 mg total) by mouth daily, Disp: 60 tablet, Rfl: 1    fluticasone (FLONASE) 50 mcg/act nasal spray, 2 sprays into each nostril daily (Patient taking differently: 2 sprays into each nostril as needed  ), Disp: 16 g, Rfl: 0    hydrochlorothiazide (HYDRODIURIL) 12 5 mg tablet, Take 1 tablet (12 5 mg total) by mouth daily, Disp: 90 tablet, Rfl: 3   ibuprofen (MOTRIN) 600 mg tablet, Take 1 tablet (600 mg total) by mouth every 6 (six) hours as needed for mild pain, Disp: 30 tablet, Rfl: 1    oseltamivir (TAMIFLU) 75 mg capsule, Take 1 capsule (75 mg total) by mouth 2 (two) times a day for 5 days, Disp: 10 capsule, Rfl: 0    PROAIR  (90 Base) MCG/ACT inhaler, Inhale 2 puffs every 6 (six) hours as needed, Disp: , Rfl: 0    varenicline (CHANTIX RAMESH) 0 5 MG X 11 & 1 MG X 42 tablet, Take by mouth 2 (two) times a day Take one 0 5mg tab by mouth 1x daily for 3 days, then increase to one 0 5mg tab 2x daily for 3 days, then increase to one 1mg tab 2x daily, Disp: , Rfl:     al mag oxide-diphenhydramine-lidocaine viscous (MAGIC MOUTHWASH) 1:1:1 suspension, Swish and spit 10 mL every 4 (four) hours as needed for mouth pain or discomfort, Disp: 180 mL, Rfl: 0    ciprofloxacin-dexamethasone (CIPRODEX) otic suspension, Administer 4 drops to the right ear 2 (two) times a day for 7 days, Disp: 7 5 mL, Rfl: 0    Current Allergies     Allergies as of 01/26/2019 - Reviewed 01/26/2019   Allergen Reaction Noted    Bee pollen  12/13/2012    Pollen extract Allergic Rhinitis and Sneezing 12/13/2012            The following portions of the patient's history were reviewed and updated as appropriate: allergies, current medications, past family history, past medical history, past social history, past surgical history and problem list      Past Medical History:   Diagnosis Date    Discharge from right nipple     last assessed 03/20/2013    Hyperlipidemia     Hypertension        Past Surgical History:   Procedure Laterality Date    CARPAL TUNNEL RELEASE      AZ HYSTEROSCOPY,W/ENDO BX N/A 11/20/2018    Procedure: DILATATION AND CURETTAGE (D&C) WITH HYSTEROSCOPY;  Surgeon: Malou Barber DO;  Location: [facility];  Service: Gynecology    TUBAL LIGATION      WRIST SURGERY Bilateral        Family History   Problem Relation Age of Onset    Heart attack Mother     Diabetes Father     Hypertension Father     Hyperlipidemia Father     Breast cancer Maternal Grandmother          Medications have been verified  Objective   /72 (BP Location: Right arm, Patient Position: Sitting)   Pulse 86   Temp 97 6 °F (36 4 °C) (Temporal)   Resp 18   Ht 5' 3" (1 6 m)   Wt 88 5 kg (195 lb)   LMP 01/01/2016   SpO2 96%   BMI 34 54 kg/m²        Physical Exam     Physical Exam   Constitutional: She is oriented to person, place, and time  She appears well-developed and well-nourished  No distress  HENT:   Head: Normocephalic and atraumatic  Right Ear: External ear normal    Left Ear: External ear normal    Bilateral tonsillar erythema with +1 soft tissue swelling  With small amount of exudate  There are small aphthous ulcers inside of the lower lip  Eyes: Pupils are equal, round, and reactive to light  Conjunctivae and EOM are normal    Lymphadenopathy:     She has cervical adenopathy  Neurological: She is alert and oriented to person, place, and time  Skin: Skin is warm and dry  Psychiatric: She has a normal mood and affect  Her behavior is normal    Nursing note and vitals reviewed

## 2019-01-28 LAB — BACTERIA THROAT CULT: NORMAL

## 2019-01-29 ENCOUNTER — OFFICE VISIT (OUTPATIENT)
Dept: FAMILY MEDICINE CLINIC | Facility: CLINIC | Age: 49
End: 2019-01-29

## 2019-01-29 VITALS
WEIGHT: 194 LBS | SYSTOLIC BLOOD PRESSURE: 122 MMHG | DIASTOLIC BLOOD PRESSURE: 70 MMHG | HEIGHT: 63 IN | HEART RATE: 78 BPM | BODY MASS INDEX: 34.38 KG/M2 | TEMPERATURE: 96.1 F | RESPIRATION RATE: 18 BRPM

## 2019-01-29 DIAGNOSIS — J06.9 VIRAL URI: Primary | ICD-10-CM

## 2019-01-29 PROBLEM — R19.5 LOOSE STOOLS: Status: RESOLVED | Noted: 2018-01-30 | Resolved: 2019-01-29

## 2019-01-29 PROBLEM — Z23 NEED FOR INFLUENZA VACCINATION: Status: RESOLVED | Noted: 2018-11-16 | Resolved: 2019-01-29

## 2019-01-29 PROCEDURE — 99213 OFFICE O/P EST LOW 20 MIN: CPT | Performed by: FAMILY MEDICINE

## 2019-01-29 RX ORDER — ONDANSETRON 4 MG/1
4 TABLET, ORALLY DISINTEGRATING ORAL EVERY 8 HOURS PRN
COMMUNITY
Start: 2019-01-28 | End: 2019-04-26 | Stop reason: ALTCHOICE

## 2019-01-29 RX ORDER — AMOXICILLIN 500 MG/1
500 TABLET, FILM COATED ORAL
COMMUNITY
Start: 2019-01-28 | End: 2019-02-07

## 2019-01-29 NOTE — LETTER
January 29, 2019     Patient: Farhana Williamson   YOB: 1970   Date of Visit: 1/29/2019       To Whom it May Concern:    Farhana Williamson is under my professional care  She was seen in my office on 1/29/2019  She may return to work on 1/30/2019  If you have any questions or concerns, please don't hesitate to call           Sincerely,          Jaya Major DO        CC: No Recipients

## 2019-01-30 ENCOUNTER — TELEPHONE (OUTPATIENT)
Dept: FAMILY MEDICINE CLINIC | Facility: CLINIC | Age: 49
End: 2019-01-30

## 2019-01-30 NOTE — TELEPHONE ENCOUNTER
Patient called today and stated she didn't go to work today due to fever and still didn't feel well she would like to extended her work to return to work on 02/02  Thanks

## 2019-02-19 ENCOUNTER — OFFICE VISIT (OUTPATIENT)
Dept: FAMILY MEDICINE CLINIC | Facility: CLINIC | Age: 49
End: 2019-02-19

## 2019-02-19 VITALS
DIASTOLIC BLOOD PRESSURE: 90 MMHG | WEIGHT: 196.6 LBS | TEMPERATURE: 98 F | RESPIRATION RATE: 16 BRPM | BODY MASS INDEX: 33.57 KG/M2 | SYSTOLIC BLOOD PRESSURE: 132 MMHG | HEIGHT: 64 IN | HEART RATE: 80 BPM

## 2019-02-19 DIAGNOSIS — I10 ESSENTIAL HYPERTENSION: Primary | ICD-10-CM

## 2019-02-19 DIAGNOSIS — F17.200 SMOKING: ICD-10-CM

## 2019-02-19 DIAGNOSIS — J02.9 SORE THROAT: ICD-10-CM

## 2019-02-19 LAB — S PYO AG THROAT QL: NEGATIVE

## 2019-02-19 PROCEDURE — 99213 OFFICE O/P EST LOW 20 MIN: CPT | Performed by: FAMILY MEDICINE

## 2019-02-19 PROCEDURE — 87880 STREP A ASSAY W/OPTIC: CPT | Performed by: FAMILY MEDICINE

## 2019-02-19 PROCEDURE — 87070 CULTURE OTHR SPECIMN AEROBIC: CPT | Performed by: FAMILY MEDICINE

## 2019-02-19 NOTE — ASSESSMENT & PLAN NOTE
Rapid strep in office negative, will send for throat culture because grandson has acute otitis media and treated with antibiotics  Encouraged hydration, supportive care in the meantime

## 2019-02-19 NOTE — PROGRESS NOTES
Assessment/Plan     Smoking  Patient has not smoked for 10 days, was not Chantix, not taking that any more, encouraged her to continue to not smoke    Essential hypertension  Within goal, continue hydrochlorothiazide 12 5 mg daily  Continue DASH diet  Will have her follow up in 3 month    Sore throat  Rapid strep in office negative, will send for throat culture because grandson has acute otitis media and treated with antibiotics  Encouraged hydration, supportive care in the meantime    Diagnoses and all orders for this visit:    Essential hypertension    Smoking    Sore throat  -     Throat culture; Future         Subjective     Chief Complaint   Patient presents with    Follow-up        Linda Berry when presented to office for follow-up visit  She states that she was put back on Chantix and now has stopped smoking for more than 10 days, and hopefully it will continue like that  She had acute otitis media in December, treated with antibiotics, that was followed with yeast infection, and then flu  She states that she still has cough, it was beginning to get better but over the last week it worsened, she lost her voice and had sore throat for last few days  Now her voice is back, but the itchiness and soreness of throat is still there  She denies any fevers, difficulty in breathing, wheezing, nausea, vomiting or any other symptoms  She states that the cough is productive, greenish-yellow sputum  The following portions of the patient's history were reviewed and updated as appropriate: allergies, current medications, past family history, past medical history, past social history, past surgical history and problem list     Review of Systems   Constitutional: Negative for activity change, appetite change, chills and fever  HENT: Positive for congestion, sore throat and voice change  Respiratory: Positive for cough  Negative for shortness of breath and wheezing      Cardiovascular: Negative for chest pain and palpitations  Gastrointestinal: Negative for abdominal distention, diarrhea, nausea and vomiting  Endocrine: Negative for cold intolerance  Genitourinary: Negative for difficulty urinating  Musculoskeletal: Negative for back pain  Neurological: Negative for headaches  Objective     Vitals:Blood pressure 132/90, pulse 80, temperature 98 °F (36 7 °C), temperature source Tympanic, resp  rate 16, height 5' 3 8" (1 621 m), weight 89 2 kg (196 lb 9 6 oz), last menstrual period 01/01/2016    Physical Exam:  Physical Exam    Lab Results:

## 2019-02-19 NOTE — ASSESSMENT & PLAN NOTE
Within goal, continue hydrochlorothiazide 12 5 mg daily  Continue DASH diet    Will have her follow up in 3 month

## 2019-02-21 LAB — BACTERIA THROAT CULT: NORMAL

## 2019-02-22 ENCOUNTER — TELEPHONE (OUTPATIENT)
Dept: FAMILY MEDICINE CLINIC | Facility: CLINIC | Age: 49
End: 2019-02-22

## 2019-02-22 NOTE — TELEPHONE ENCOUNTER
Patient took self off chantix due to being depressed  Patient stated that the day after stopping the chantix, she started smoking again  Patient requesting something different to help quit smoking  Please advise  Thank you

## 2019-03-22 ENCOUNTER — OFFICE VISIT (OUTPATIENT)
Dept: FAMILY MEDICINE CLINIC | Facility: CLINIC | Age: 49
End: 2019-03-22

## 2019-03-22 VITALS
TEMPERATURE: 96.9 F | HEIGHT: 64 IN | HEART RATE: 78 BPM | DIASTOLIC BLOOD PRESSURE: 88 MMHG | SYSTOLIC BLOOD PRESSURE: 130 MMHG | BODY MASS INDEX: 33.39 KG/M2 | RESPIRATION RATE: 16 BRPM | WEIGHT: 195.6 LBS

## 2019-03-22 DIAGNOSIS — R30.0 DYSURIA: Primary | ICD-10-CM

## 2019-03-22 DIAGNOSIS — M62.838 MUSCLE SPASM: ICD-10-CM

## 2019-03-22 DIAGNOSIS — R10.9 FLANK PAIN: ICD-10-CM

## 2019-03-22 LAB
BACTERIA UR QL AUTO: ABNORMAL /HPF
BILIRUB UR QL STRIP: NEGATIVE
CLARITY UR: CLEAR
COLOR UR: YELLOW
GLUCOSE UR STRIP-MCNC: NEGATIVE MG/DL
HGB UR QL STRIP.AUTO: NEGATIVE
HYALINE CASTS #/AREA URNS LPF: ABNORMAL /LPF
KETONES UR STRIP-MCNC: NEGATIVE MG/DL
LEUKOCYTE ESTERASE UR QL STRIP: ABNORMAL
NITRITE UR QL STRIP: NEGATIVE
NON-SQ EPI CELLS URNS QL MICRO: ABNORMAL /HPF
PH UR STRIP.AUTO: 6.5 [PH]
PROT UR STRIP-MCNC: NEGATIVE MG/DL
RBC #/AREA URNS AUTO: ABNORMAL /HPF
SL AMB  POCT GLUCOSE, UA: NEGATIVE
SL AMB LEUKOCYTE ESTERASE,UA: NEGATIVE
SL AMB POCT BILIRUBIN,UA: NEGATIVE
SL AMB POCT BLOOD,UA: ABNORMAL
SL AMB POCT CLARITY,UA: CLEAR
SL AMB POCT COLOR,UA: YELLOW
SL AMB POCT KETONES,UA: NEGATIVE
SL AMB POCT NITRITE,UA: NEGATIVE
SL AMB POCT PH,UA: 6.5
SL AMB POCT SPECIFIC GRAVITY,UA: 1.01
SL AMB POCT URINE PROTEIN: NEGATIVE
SL AMB POCT UROBILINOGEN: 0.2
SP GR UR STRIP.AUTO: 1.01 (ref 1–1.03)
UROBILINOGEN UR QL STRIP.AUTO: 0.2 E.U./DL
WBC #/AREA URNS AUTO: ABNORMAL /HPF

## 2019-03-22 PROCEDURE — 99213 OFFICE O/P EST LOW 20 MIN: CPT | Performed by: FAMILY MEDICINE

## 2019-03-22 PROCEDURE — 81001 URINALYSIS AUTO W/SCOPE: CPT | Performed by: FAMILY MEDICINE

## 2019-03-22 PROCEDURE — 81003 URINALYSIS AUTO W/O SCOPE: CPT | Performed by: FAMILY MEDICINE

## 2019-03-23 NOTE — ASSESSMENT & PLAN NOTE
Differentials include Show  Unlikely to be secondary to pyelonephritis, as patient has no history of fever, chills, but UA pending for formal evaluation  Given patient has history of renal stones and with trace blood in urine will also check for x-ray KUB  On chart review, CT in 3/2018 showed renal stone in right kidney which was not seen in CT in 11/18  Advised patient to take Tylenol for pain in the meantime  Return to care parameters explained

## 2019-03-23 NOTE — ASSESSMENT & PLAN NOTE
Urine dip in office showed negative leukocytes, negative nitrites with trace blood    Will send sample for formal microscopic examination with reflex to culture  Advised hydration in the meantime

## 2019-03-23 NOTE — ASSESSMENT & PLAN NOTE
Differentials include pyelonephritis, kidney stone, muscle spasm   On chart review, CT in 3/2018 showed renal stone in right kidney which was not seen in CT in 11/18 but given history of renal stones and with trace blood in urine will also check for x-ray KUB  Unlikely to be secondary to pyelonephritis, as patient has no history of fever, chills, but UA pending for formal evaluation    Advised patient to take Tylenol for pain in the meantime  Return to care parameters explained

## 2019-03-23 NOTE — PROGRESS NOTES
Assessment/Plan     Dysuria  Urine dip in office showed negative leukocytes, negative nitrites with trace blood  Will send sample for formal microscopic examination with reflex to culture  Advised hydration in the meantime    Flank pain  Differentials include pyelonephritis, kidney stone, muscle spasm   On chart review, CT in 3/2018 showed renal stone in right kidney which was not seen in CT in 11/18 but given history of renal stones and with trace blood in urine will also check for x-ray KUB  Unlikely to be secondary to pyelonephritis, as patient has no history of fever, chills, but UA pending for formal evaluation    Advised patient to take Tylenol for pain in the meantime  Return to care parameters explained    Diagnoses and all orders for this visit:    Dysuria  -     POCT urine dip auto non-scope  -     XR abdomen 1 view kub; Future  -     UA w Reflex to Microscopic w Reflex to Culture - Clinic Collect  -     Urine Microscopic    Muscle spasm  -     XR abdomen 1 view kub; Future    Flank pain         Subjective     Chief Complaint   Patient presents with    Flank Pain     lower left side pain X 5 days        51-year-old female presented to office for an acute visit with complaints of left flank pain for 5 days with burning with urination for 2 days  Patient states that she has been feeling pain in her left flank going to back for last 3 days  The pain increases with pressure on it, change in posture and increases with prolonged sitting  She denies any injury with the pain, also reports that she has not taken anything for the pain yet as it is dull ache that increases with prolonged sitting  She also has been feeling burning with urination for last 2 days, more so at the and of passing urine  She denies any fever, chills or any other symptoms with it    She does have history of renal stones which passed on their own 5 years ago but states that she was told that there was one stone still in left kidney  Flank Pain   Associated symptoms include dysuria  Pertinent negatives include no chest pain, fever or headaches  The following portions of the patient's history were reviewed and updated as appropriate: allergies, current medications, past family history, past medical history, past social history, past surgical history and problem list     Review of Systems   Constitutional: Negative for chills and fever  HENT: Negative for congestion  Respiratory: Negative for shortness of breath  Cardiovascular: Negative for chest pain  Gastrointestinal: Negative for diarrhea, nausea and vomiting  Genitourinary: Positive for dysuria and flank pain  Neurological: Negative for headaches  Objective     Vitals:Blood pressure 130/88, pulse 78, temperature (!) 96 9 °F (36 1 °C), resp  rate 16, height 5' 3 8" (1 621 m), weight 88 7 kg (195 lb 9 6 oz), last menstrual period 01/01/2016  Physical Exam:  Physical Exam   Constitutional: She is oriented to person, place, and time  She appears well-developed and well-nourished  HENT:   Head: Normocephalic and atraumatic  Mouth/Throat: Oropharynx is clear and moist    Eyes: Conjunctivae and EOM are normal    Neck: Normal range of motion  Neck supple  Cardiovascular: Normal rate, regular rhythm and normal heart sounds  Exam reveals no friction rub  No murmur heard  Pulmonary/Chest: Effort normal and breath sounds normal  No respiratory distress  She has no wheezes  She has no rales  Abdominal: Soft  Bowel sounds are normal  She exhibits no distension  There is no tenderness  Musculoskeletal: She exhibits tenderness (left flank)  No CVA tenderness   Neurological: She is alert and oriented to person, place, and time  Skin: Skin is warm and dry

## 2019-03-24 ENCOUNTER — APPOINTMENT (EMERGENCY)
Dept: RADIOLOGY | Facility: HOSPITAL | Age: 49
End: 2019-03-24
Payer: COMMERCIAL

## 2019-03-24 ENCOUNTER — HOSPITAL ENCOUNTER (EMERGENCY)
Facility: HOSPITAL | Age: 49
Discharge: HOME | End: 2019-03-24
Attending: EMERGENCY MEDICINE | Admitting: EMERGENCY MEDICINE
Payer: COMMERCIAL

## 2019-03-24 ENCOUNTER — TELEPHONE (OUTPATIENT)
Dept: OTHER | Facility: OTHER | Age: 49
End: 2019-03-24

## 2019-03-24 VITALS
WEIGHT: 195.55 LBS | DIASTOLIC BLOOD PRESSURE: 63 MMHG | RESPIRATION RATE: 16 BRPM | BODY MASS INDEX: 34.65 KG/M2 | HEART RATE: 77 BPM | SYSTOLIC BLOOD PRESSURE: 131 MMHG | TEMPERATURE: 98.1 F | OXYGEN SATURATION: 97 % | HEIGHT: 63 IN

## 2019-03-24 DIAGNOSIS — R10.9 FLANK PAIN: Primary | ICD-10-CM

## 2019-03-24 LAB
ANION GAP BLD CALC-SCNC: 18 MMOL/L (ref 4–13)
BACTERIA UR QL AUTO: ABNORMAL /HPF
BILIRUB UR QL STRIP: NEGATIVE
BUN BLD-MCNC: 20 MG/DL (ref 5–25)
CA-I BLD-SCNC: 1.16 MMOL/L (ref 1.12–1.32)
CAOX CRY URNS QL MICRO: ABNORMAL /HPF
CHLORIDE BLD-SCNC: 102 MMOL/L (ref 100–108)
CLARITY UR: CLEAR
COLOR UR: YELLOW
COLOR, POC: YELLOW
CREAT BLD-MCNC: 0.8 MG/DL (ref 0.6–1.3)
GFR SERPL CREATININE-BSD FRML MDRD: 87 ML/MIN/1.73SQ M
GLUCOSE SERPL-MCNC: 118 MG/DL (ref 65–140)
GLUCOSE UR STRIP-MCNC: NEGATIVE MG/DL
HCT VFR BLD CALC: 36 % (ref 34.8–46.1)
HGB BLDA-MCNC: 12.2 G/DL (ref 11.5–15.4)
HGB UR QL STRIP.AUTO: ABNORMAL
KETONES UR STRIP-MCNC: NEGATIVE MG/DL
LEUKOCYTE ESTERASE UR QL STRIP: ABNORMAL
NITRITE UR QL STRIP: NEGATIVE
NON-SQ EPI CELLS URNS QL MICRO: ABNORMAL /HPF
PCO2 BLD: 28 MMOL/L (ref 21–32)
PH UR STRIP.AUTO: 5.5 [PH] (ref 4.5–8)
POTASSIUM BLD-SCNC: 3.6 MMOL/L (ref 3.5–5.3)
PROT UR STRIP-MCNC: NEGATIVE MG/DL
RBC #/AREA URNS AUTO: ABNORMAL /HPF
SODIUM BLD-SCNC: 143 MMOL/L (ref 136–145)
SP GR UR STRIP.AUTO: >=1.03 (ref 1–1.03)
SPECIMEN SOURCE: ABNORMAL
UROBILINOGEN UR QL STRIP.AUTO: 0.2 E.U./DL
WBC #/AREA URNS AUTO: ABNORMAL /HPF

## 2019-03-24 PROCEDURE — 85014 HEMATOCRIT: CPT

## 2019-03-24 PROCEDURE — 99284 EMERGENCY DEPT VISIT MOD MDM: CPT

## 2019-03-24 PROCEDURE — 87147 CULTURE TYPE IMMUNOLOGIC: CPT

## 2019-03-24 PROCEDURE — 87086 URINE CULTURE/COLONY COUNT: CPT

## 2019-03-24 PROCEDURE — 80047 BASIC METABLC PNL IONIZED CA: CPT

## 2019-03-24 PROCEDURE — 74176 CT ABD & PELVIS W/O CONTRAST: CPT

## 2019-03-24 PROCEDURE — 81001 URINALYSIS AUTO W/SCOPE: CPT

## 2019-03-24 NOTE — TELEPHONE ENCOUNTER
Gerard Cardenas 1970  CONFIDENTIALTY NOTICE: This fax transmission is intended only for the addressee  It contains information that is legally privileged,  confidential or otherwise protected from use or disclosure  If you are not the intended recipient, you are strictly prohibited from reviewing,  disclosing, copying using or disseminating any of this information or taking any action in reliance on or regarding this information  If you have  received this fax in error, please notify us immediately by telephone so that we can arrange for its return to us  Page:   Call Id: [id number]  Health Call  Standard Call Report  Health Call  Patient Name: Gerard Cardenas  Gender: Female  : 1970  Age: 52 Y 1 M 5 D  Return Phone  Number: [phone] (Home)  Address: [address]  City/State/Zip: [address] [place] [address]  Practice Name: [address]  Practice Charged:  Physician:  [address] [place] [address] Name:  Relationship To  Patient: Self  Return Phone Number: [phone] (Home)  Presenting Problem: "I have blood in my urine and back  pain "  Service Type: Triage  Charged Service 1: Toya Chan U  38  Name and  Number:  Nurse Assessment  Nurse: Rafael Glass RN Monday Date/Time: 3/24/2019 8:30:17 AM  Type of assessment required:  ---General (Adult or Child)  Duration of Current S/S  ---Since last week  Location/Radiation  ---  Temperature (F) and route:  ---Denies fever  Symptom Specific Meds (Dose/Time):  ---None  Other S/S  ---Burning sensation when urinating  Noticed after urinating there was some blood on  the tissue paper  Is now experiencing lower back pain  Pain Scale on scale of 1-10, 10 being the worst:  ---3/10  Symptom progression:  ---same  Intake and Output  ---Drinking normally / WNL  [employer] 1970  CONFIDENTIALTY NOTICE: This fax transmission is intended only for the addressee   It contains information that is legally privileged,  confidential or otherwise protected from use or disclosure  If you are not the intended recipient, you are strictly prohibited from reviewing,  disclosing, copying using or disseminating any of this information or taking any action in reliance on or regarding this information  If you have  received this fax in error, please notify us immediately by telephone so that we can arrange for its return to us  Page: 2 of 2  Call Id: [id number]  Nurse Assessment  LMP/ Pregnancy:  ---LMP: December of 2018  Breastfeeding  ---No  Last Exam/Treatment:  ---In the office on 3/22/2019 for Dysuria and muscle pain  Protocols  Protocol Title Nurse Date/Time  Urination Pain - Female Sonia Hodge RN, Dayna Lares 3/24/2019 8:35:15 AM  Question Caller Affirmed  Disp  Time Disposition Final User  3/24/2019 8:38:58 AM See Physician within 4 Hours (or PCP  triage)  Sonia Hodge RN, Suellen Madison  3/24/2019 8:39:35 AM RN Triaged Yes Sonia Hodge RN, [facility] Advice Given Per Protocol  SEE PHYSICIAN WITHIN 4 HOURS (or PCP triage): * IF OFFICE WILL BE CLOSED AND NO PCP TRIAGE: You need to be seen  within the next 3 or 4 hours  A nearby Urgent Care Center is often a good source of care  Another choice is to go to the ER  Go sooner if  you become worse  PAIN MEDICINES: IBUPROFEN (E G , MOTRIN, ADVIL): * Take 400 mg (two 200 mg pills) by mouth every 6  hours as needed  * Another choice is to take 600 mg (three 200 mg pills) by mouth every 8 hours as needed  * The most you should take  each day is 1,200 mg (six 200 mg pills a day), unless your doctor has told you to take more  CAUTION - NSAIDS (E G , IBUPROFEN,  NAPROXEN): * Do not take nonsteroidal anti-inflammatory drugs (NSAIDs) if you have stomach problems, kidney disease, heart  failure, or other contraindications to using this type of medication  * Do not take NSAID medications for over 7 days without consulting  your PCP  * Do not take NSAID medications if you are pregnant  * You may take this medicine with or without food   Taking it with food  or milk may lessen the chance the drug will upset your stomach  * GASTROINTESTINAL RISK: There is an increased risk of stomach  ulcers, GI bleeding, perforation  * CARDIOVASCULAR RISK: There may be an increased risk of heart attack and stroke  CALL BACK  IF: * You become worse  CARE ADVICE given per Urination Pain - Female (Adult) guideline  Caller Understands: Yes  Caller Disagree/Comply: Comply  PreDisposition: Unsure  Comments  User: Kendra Albright RN Date/Time: 3/24/2019 8:39:20 AM  Prefers going to [facility] ED after work

## 2019-03-25 LAB
BACTERIA UR CULT: ABNORMAL
BACTERIA UR CULT: ABNORMAL

## 2019-03-25 NOTE — TELEPHONE ENCOUNTER
Spoke with patient, she did go to ER, had UA, Labs, @ CT scan  She was told to follow up with PCP, Dr Hathaway Fell not available this week  Due to work schedule, Tues was her best day due to work schedule, scheduled @ 2:00pm on Tues

## 2019-03-26 ENCOUNTER — OFFICE VISIT (OUTPATIENT)
Dept: FAMILY MEDICINE CLINIC | Facility: CLINIC | Age: 49
End: 2019-03-26

## 2019-03-26 VITALS
HEIGHT: 63 IN | RESPIRATION RATE: 16 BRPM | SYSTOLIC BLOOD PRESSURE: 130 MMHG | TEMPERATURE: 96.8 F | DIASTOLIC BLOOD PRESSURE: 80 MMHG | WEIGHT: 197.8 LBS | HEART RATE: 78 BPM | BODY MASS INDEX: 35.05 KG/M2

## 2019-03-26 DIAGNOSIS — J06.9 VIRAL URI: ICD-10-CM

## 2019-03-26 DIAGNOSIS — N20.0 CALCIUM OXALATE STONE IN URINE: ICD-10-CM

## 2019-03-26 DIAGNOSIS — N28.1 RENAL CYST, LEFT: Primary | ICD-10-CM

## 2019-03-26 PROCEDURE — 99213 OFFICE O/P EST LOW 20 MIN: CPT | Performed by: FAMILY MEDICINE

## 2019-03-26 RX ORDER — GUAIFENESIN 600 MG
600 TABLET, EXTENDED RELEASE 12 HR ORAL EVERY 12 HOURS SCHEDULED
Qty: 20 TABLET | Refills: 0 | Status: SHIPPED | OUTPATIENT
Start: 2019-03-26 | End: 2019-04-26 | Stop reason: SDUPTHER

## 2019-03-26 NOTE — PATIENT INSTRUCTIONS
Instructions to prevent kidney stones:       Appropriate protein intake (< 30 percent of total caloric intake)  Drink at least 2 L of water per 24 hours  Take at least 250 mg per dose, or total calcium > 850 mg per day with meals  Restrict high oxalate foods (more than 6 mg per serving), such as beans, spinach, rhubarb, chocolate, wheat, nuts, and berries  Avoid foods high in salt (e g , canned or processed foods, cheese, pickles, dried meats), and do not add salt to food

## 2019-03-26 NOTE — PROGRESS NOTES
Heather Reina 1970 female MRN: [id number]  Family medicine follow up Visit        ASSESSMENT/PLAN  Diagnoses and all orders for this visit:    Renal cyst, left:  Ordered ultrasound of left kidney for further evaluation of renal cyst     -     US kidney and bladder; Future    Calcium oxalate stone in urine:  Discussed with patient about some measures to prevent recurrent renal stone:  Advised below:  Appropriate protein intake (< 30 percent of total caloric intake)  Drink at least 2 L of water per 24 hours  Take at least 250 mg per dose, or total calcium > 850 mg per day with meals  Restrict high oxalate foods (more than 6 mg per serving), such as beans, spinach, rhubarb, chocolate, wheat, nuts, and berries  Avoid foods high in salt (e g , canned or processed foods, cheese, pickles, dried meats), and do not add salt to food  Written instructions given to patient  Viral URI:   Advised supportive care at this time  Drink 6-8 glasses of water to maintain hydration  Take tylenol every 6 hours for fever and body aches  Frequent hand washing to prevent spread of infection  Honey with hot water or tea for cough  Return to office and ER precautions discussed  -     guaiFENesin (MUCINEX) 600 mg 12 hr tablet; Take 1 tablet (600 mg total) by mouth every 12 (twelve) hours          Advised patient that if she has any further vaginal spotting, follow up with OBGYN  Patient reports understanding of above assessment and plan and agrees with plan  FU in 2 weeks with PCP  Future Appointments   Date Time Provider Susannah Nicholson   4/12/2019  2:40 PM [facility]   4/26/2019  2:00 PM Jami Pickens MD [facility] [place]        SUBJECTIVE  CC: Follow-up (ER )       HPI  Heather Reina is a 52 y o  female here for ER follow up for flank pain and vaginal spotting  Patient was seen in office on 3/22/2019 for flank pain, x-ray KUB was ordered    Patient reports that her pain got worse over the weekend and she went to ER  Her pain was mostly on left side  She did saw some blood on toilet paper which she did not think was blood in her urine but she thinks may have vaginal spotting  Urine microscopic in the ER showed innumerable calcium oxalate crystals  She did have CT scan which showed punctate bilateral nonobstructing calculi with small amount of high-density calcification associated with previously seen renal cyst on left side, follow-up ultrasound recommended for evaluation for development of solid tissue  At today's visit, patient reports that her symptoms are completely resolved  But she is worried about renal cyst  She reports that she did have renal stone 6 years ago also  She reports that she did have D and C in November at [facility] [place] for her postmenopausal bleeding  She did not have any spotting after that except this 1 time  At today's visit she is also complaining of some nasal congestion, coughing since 2 day  She denies any fever  She is eating and drinking as per normal   She did have sick contacts with grandson at home  She reports that she quit smoking few weeks ago  Review of Systems   Constitutional: Negative for chills and fever  HENT: Positive for congestion, postnasal drip and rhinorrhea  Respiratory: Positive for cough  Negative for shortness of breath and wheezing  Cardiovascular: Negative for chest pain, palpitations and leg swelling  Gastrointestinal: Negative for abdominal pain, blood in stool, constipation, diarrhea and vomiting  Genitourinary: Negative for difficulty urinating, dysuria, flank pain and vaginal bleeding  Musculoskeletal: Negative for back pain         Historical Information   The patient history was reviewed as follows:  Past Medical History:   Diagnosis Date    Discharge from right nipple     last assessed 03/20/2013    Hyperlipidemia     Hypertension     Loose stools 1/30/2018     Past Surgical History: Procedure Laterality Date    CARPAL TUNNEL RELEASE      KS HYSTEROSCOPY,W/ENDO BX N/A 11/20/2018    Procedure: DILATATION AND CURETTAGE (D&C) WITH HYSTEROSCOPY;  Surgeon: Paresh Galo DO;  Location: [facility];  Service: Gynecology    TUBAL LIGATION      WRIST SURGERY Bilateral      Family History   Problem Relation Age of Onset    Heart attack Mother     Diabetes Father     Hypertension Father     Hyperlipidemia Father     Breast cancer Maternal Grandmother       Social History   Social History     Substance and Sexual Activity   Alcohol Use No     Social History     Substance and Sexual Activity   Drug Use No     Social History     Tobacco Use   Smoking Status Former Smoker   Smokeless Tobacco Former User   Tobacco Comment    2pk day, quit 2/2018       Medications:   Meds/Allergies   Current Outpatient Medications   Medication Sig Dispense Refill    acetaminophen (TYLENOL) 650 mg CR tablet Take 1 tablet (650 mg total) by mouth every 8 (eight) hours as needed (fever) 30 tablet 1    al mag oxide-diphenhydramine-lidocaine viscous (MAGIC MOUTHWASH) 1:1:1 suspension Swish and spit 10 mL every 4 (four) hours as needed for mouth pain or discomfort (Patient not taking: Reported on 3/22/2019) 180 mL 0    albuterol (VENTOLIN HFA) 90 mcg/act inhaler Inhale 2 puffs every 4 (four) hours as needed for wheezing or shortness of breath 18 g 0    atorvastatin (LIPITOR) 40 mg tablet Take 1 tablet (40 mg total) by mouth daily 30 tablet 5    ciprofloxacin-dexamethasone (CIPRODEX) otic suspension Administer 4 drops to the right ear 2 (two) times a day for 7 days (Patient not taking: Reported on 2/19/2019) 7 5 mL 0    fexofenadine (ALLEGRA) 60 MG tablet Take 1 tablet (60 mg total) by mouth daily (Patient not taking: Reported on 2/19/2019) 60 tablet 1    fluticasone (FLONASE) 50 mcg/act nasal spray 2 sprays into each nostril daily (Patient taking differently: 2 sprays into each nostril as needed  ) 16 g 0    hydrochlorothiazide (HYDRODIURIL) 12 5 mg tablet Take 1 tablet (12 5 mg total) by mouth daily 90 tablet 3    ibuprofen (MOTRIN) 600 mg tablet Take 1 tablet (600 mg total) by mouth every 6 (six) hours as needed for mild pain 30 tablet 1    Lidocaine HCl (DR ROMAN'S MAGIC MOUTH WASH) SWISH AND SPIT 10 ML EVERY 4 (FOUR) HOURS AS NEEDED FOR MOUTH PAIN OR DISCOMFORT  0    ondansetron (ZOFRAN-ODT) 4 mg disintegrating tablet Take 4 mg by mouth every 8 (eight) hours as needed      PROAIR  (90 Base) MCG/ACT inhaler Inhale 2 puffs every 6 (six) hours as needed  0    varenicline (CHANTIX RAMESH) 0 5 MG X 11 & 1 MG X 42 tablet Take by mouth 2 (two) times a day Take one 0 5mg tab by mouth 1x daily for 3 days, then increase to one 0 5mg tab 2x daily for 3 days, then increase to one 1mg tab 2x daily       No current facility-administered medications for this visit  Allergies   Allergen Reactions    Bee Pollen      Other reaction(s): Allergic Rhinitis, Sneezing    Pollen Extract Allergic Rhinitis and Sneezing       OBJECTIVE  Vitals:   Vitals:    03/26/19 1402   BP: 130/80   Pulse: 78   Resp: 16   Temp: (!) 96 8 °F (36 °C)   Weight: 89 7 kg (197 lb 12 8 oz)   Height: 5' 3" (1 6 m)       Invasive Devices          None          Physical Exam   Constitutional: She is oriented to person, place, and time  She appears well-developed and well-nourished  HENT:   Head: Normocephalic  Right Ear: External ear normal    Left Ear: External ear normal    Mouth/Throat: Oropharynx is clear and moist    Very minimal mucosal edema and rhinorrhea  Eyes: Conjunctivae and EOM are normal    Neck: Neck supple  Cardiovascular: Normal rate, regular rhythm, normal heart sounds and intact distal pulses  No murmur heard  Pulmonary/Chest: Effort normal and breath sounds normal  She has no wheezes  She has no rales  Abdominal: Soft  Bowel sounds are normal  She exhibits no mass  There is no tenderness     Musculoskeletal: Normal range of motion  Neurological: She is alert and oriented to person, place, and time  She exhibits normal muscle tone  Skin: Skin is warm  Capillary refill takes less than 2 seconds  Psychiatric: She has a normal mood and affect   Her behavior is normal                       _____________________________________________________________________

## 2019-03-27 ENCOUNTER — TRANSCRIBE ORDERS (OUTPATIENT)
Dept: ADMISSIONS | Facility: HOSPITAL | Age: 49
End: 2019-03-27

## 2019-03-29 ENCOUNTER — HOSPITAL ENCOUNTER (OUTPATIENT)
Dept: RADIOLOGY | Facility: HOSPITAL | Age: 49
Discharge: HOME | End: 2019-03-29
Payer: COMMERCIAL

## 2019-03-29 DIAGNOSIS — N28.1 RENAL CYST, LEFT: ICD-10-CM

## 2019-03-29 PROCEDURE — 76770 US EXAM ABDO BACK WALL COMP: CPT

## 2019-04-09 ENCOUNTER — OFFICE VISIT (OUTPATIENT)
Dept: FAMILY MEDICINE CLINIC | Facility: CLINIC | Age: 49
End: 2019-04-09

## 2019-04-09 ENCOUNTER — TELEPHONE (OUTPATIENT)
Dept: FAMILY MEDICINE CLINIC | Facility: CLINIC | Age: 49
End: 2019-04-09

## 2019-04-09 VITALS
SYSTOLIC BLOOD PRESSURE: 120 MMHG | WEIGHT: 198.2 LBS | TEMPERATURE: 98.3 F | HEART RATE: 78 BPM | HEIGHT: 63 IN | DIASTOLIC BLOOD PRESSURE: 80 MMHG | RESPIRATION RATE: 16 BRPM | BODY MASS INDEX: 35.12 KG/M2

## 2019-04-09 DIAGNOSIS — N28.1 RENAL CYST, LEFT: ICD-10-CM

## 2019-04-09 DIAGNOSIS — J30.1 ALLERGIC RHINITIS DUE TO POLLEN, UNSPECIFIED SEASONALITY: Primary | ICD-10-CM

## 2019-04-09 DIAGNOSIS — J20.9 ACUTE BRONCHITIS, UNSPECIFIED ORGANISM: ICD-10-CM

## 2019-04-09 DIAGNOSIS — M54.50 ACUTE BILATERAL LOW BACK PAIN WITHOUT SCIATICA: ICD-10-CM

## 2019-04-09 DIAGNOSIS — M62.838 MUSCLE SPASM: Primary | ICD-10-CM

## 2019-04-09 PROBLEM — J06.9 VIRAL URI: Status: RESOLVED | Noted: 2019-01-29 | Resolved: 2019-04-09

## 2019-04-09 PROBLEM — J02.9 SORE THROAT: Status: RESOLVED | Noted: 2019-02-19 | Resolved: 2019-04-09

## 2019-04-09 PROBLEM — J02.9 ACUTE VIRAL PHARYNGITIS: Status: RESOLVED | Noted: 2019-01-26 | Resolved: 2019-04-09

## 2019-04-09 PROBLEM — N89.8 VAGINAL ITCHING: Status: RESOLVED | Noted: 2019-01-18 | Resolved: 2019-04-09

## 2019-04-09 PROCEDURE — 99214 OFFICE O/P EST MOD 30 MIN: CPT | Performed by: FAMILY MEDICINE

## 2019-04-09 RX ORDER — NAPROXEN 500 MG/1
500 TABLET ORAL 2 TIMES DAILY WITH MEALS
Qty: 30 TABLET | Refills: 0 | Status: SHIPPED | OUTPATIENT
Start: 2019-04-09 | End: 2019-04-25 | Stop reason: SDUPTHER

## 2019-04-09 RX ORDER — NAPROXEN 500 MG/1
500 TABLET ORAL 2 TIMES DAILY PRN
Qty: 30 TABLET | Refills: 0 | Status: SHIPPED | OUTPATIENT
Start: 2019-04-09 | End: 2019-04-09

## 2019-04-09 RX ORDER — FLUTICASONE PROPIONATE 50 MCG
2 SPRAY, SUSPENSION (ML) NASAL DAILY
Qty: 16 G | Refills: 0 | Status: SHIPPED | OUTPATIENT
Start: 2019-04-09 | End: 2019-05-06 | Stop reason: SDUPTHER

## 2019-04-12 ENCOUNTER — HOSPITAL ENCOUNTER (OUTPATIENT)
Dept: RADIOLOGY | Age: 49
Discharge: HOME | End: 2019-04-12
Payer: COMMERCIAL

## 2019-04-12 VITALS — WEIGHT: 198 LBS | HEIGHT: 63 IN | BODY MASS INDEX: 35.08 KG/M2

## 2019-04-12 DIAGNOSIS — Z12.31 ENCOUNTER FOR SCREENING MAMMOGRAM FOR MALIGNANT NEOPLASM OF BREAST: ICD-10-CM

## 2019-04-12 PROCEDURE — 77067 SCR MAMMO BI INCL CAD: CPT

## 2019-04-12 PROCEDURE — 77063 BREAST TOMOSYNTHESIS BI: CPT

## 2019-04-25 DIAGNOSIS — M62.838 MUSCLE SPASM: ICD-10-CM

## 2019-04-25 RX ORDER — NAPROXEN 500 MG/1
TABLET ORAL
Qty: 30 TABLET | Refills: 0 | Status: SHIPPED | OUTPATIENT
Start: 2019-04-25 | End: 2019-05-12 | Stop reason: SDUPTHER

## 2019-04-26 ENCOUNTER — OFFICE VISIT (OUTPATIENT)
Dept: FAMILY MEDICINE CLINIC | Facility: CLINIC | Age: 49
End: 2019-04-26

## 2019-04-26 VITALS
BODY MASS INDEX: 35.51 KG/M2 | SYSTOLIC BLOOD PRESSURE: 130 MMHG | HEIGHT: 63 IN | DIASTOLIC BLOOD PRESSURE: 80 MMHG | HEART RATE: 78 BPM | TEMPERATURE: 97.9 F | WEIGHT: 200.4 LBS | RESPIRATION RATE: 16 BRPM

## 2019-04-26 DIAGNOSIS — I10 ESSENTIAL HYPERTENSION: ICD-10-CM

## 2019-04-26 DIAGNOSIS — R73.03 PREDIABETES: ICD-10-CM

## 2019-04-26 DIAGNOSIS — J30.9 ALLERGIC RHINITIS, UNSPECIFIED SEASONALITY, UNSPECIFIED TRIGGER: Primary | ICD-10-CM

## 2019-04-26 DIAGNOSIS — J06.9 VIRAL URI: ICD-10-CM

## 2019-04-26 DIAGNOSIS — F17.200 SMOKING: ICD-10-CM

## 2019-04-26 DIAGNOSIS — E03.8 SUBCLINICAL HYPOTHYROIDISM: ICD-10-CM

## 2019-04-26 DIAGNOSIS — E78.5 DYSLIPIDEMIA: ICD-10-CM

## 2019-04-26 PROCEDURE — 3075F SYST BP GE 130 - 139MM HG: CPT | Performed by: FAMILY MEDICINE

## 2019-04-26 PROCEDURE — 99213 OFFICE O/P EST LOW 20 MIN: CPT | Performed by: FAMILY MEDICINE

## 2019-04-26 PROCEDURE — 3079F DIAST BP 80-89 MM HG: CPT | Performed by: FAMILY MEDICINE

## 2019-04-26 RX ORDER — GUAIFENESIN 600 MG
600 TABLET, EXTENDED RELEASE 12 HR ORAL EVERY 12 HOURS SCHEDULED
Qty: 20 TABLET | Refills: 0 | Status: SHIPPED | OUTPATIENT
Start: 2019-04-26 | End: 2019-10-18

## 2019-04-26 RX ORDER — FEXOFENADINE HYDROCHLORIDE 60 MG/1
60 TABLET, FILM COATED ORAL DAILY
Qty: 30 TABLET | Refills: 0 | Status: SHIPPED | OUTPATIENT
Start: 2019-04-26 | End: 2019-05-25 | Stop reason: SDUPTHER

## 2019-04-27 DIAGNOSIS — J45.21 MILD INTERMITTENT ASTHMATIC BRONCHITIS WITH ACUTE EXACERBATION: ICD-10-CM

## 2019-04-30 ENCOUNTER — LAB (OUTPATIENT)
Dept: LAB | Facility: HOSPITAL | Age: 49
End: 2019-04-30
Payer: COMMERCIAL

## 2019-04-30 DIAGNOSIS — I10 ESSENTIAL HYPERTENSION: ICD-10-CM

## 2019-04-30 DIAGNOSIS — E78.5 DYSLIPIDEMIA: ICD-10-CM

## 2019-04-30 DIAGNOSIS — E03.8 SUBCLINICAL HYPOTHYROIDISM: ICD-10-CM

## 2019-04-30 DIAGNOSIS — R73.03 PREDIABETES: ICD-10-CM

## 2019-04-30 LAB
ALBUMIN SERPL BCP-MCNC: 3.6 G/DL (ref 3.5–5)
ALP SERPL-CCNC: 133 U/L (ref 46–116)
ALT SERPL W P-5'-P-CCNC: 16 U/L (ref 12–78)
ANION GAP SERPL CALCULATED.3IONS-SCNC: 2 MMOL/L (ref 4–13)
AST SERPL W P-5'-P-CCNC: 8 U/L (ref 5–45)
BILIRUB SERPL-MCNC: 0.3 MG/DL (ref 0.2–1)
BUN SERPL-MCNC: 18 MG/DL (ref 5–25)
CALCIUM SERPL-MCNC: 8 MG/DL (ref 8.3–10.1)
CHLORIDE SERPL-SCNC: 105 MMOL/L (ref 100–108)
CHOLEST SERPL-MCNC: 204 MG/DL (ref 50–200)
CO2 SERPL-SCNC: 31 MMOL/L (ref 21–32)
CREAT SERPL-MCNC: 0.76 MG/DL (ref 0.6–1.3)
EST. AVERAGE GLUCOSE BLD GHB EST-MCNC: 126 MG/DL
GFR SERPL CREATININE-BSD FRML MDRD: 92 ML/MIN/1.73SQ M
GLUCOSE P FAST SERPL-MCNC: 103 MG/DL (ref 65–99)
HBA1C MFR BLD: 6 % (ref 4.2–6.3)
HDLC SERPL-MCNC: 40 MG/DL (ref 40–60)
LDLC SERPL CALC-MCNC: 153 MG/DL (ref 0–100)
NONHDLC SERPL-MCNC: 164 MG/DL
POTASSIUM SERPL-SCNC: 3.6 MMOL/L (ref 3.5–5.3)
PROT SERPL-MCNC: 7.4 G/DL (ref 6.4–8.2)
SODIUM SERPL-SCNC: 138 MMOL/L (ref 136–145)
TRIGL SERPL-MCNC: 56 MG/DL
TSH SERPL DL<=0.05 MIU/L-ACNC: 3.43 UIU/ML (ref 0.36–3.74)

## 2019-04-30 PROCEDURE — 84443 ASSAY THYROID STIM HORMONE: CPT

## 2019-04-30 PROCEDURE — 80053 COMPREHEN METABOLIC PANEL: CPT

## 2019-04-30 PROCEDURE — 36415 COLL VENOUS BLD VENIPUNCTURE: CPT

## 2019-04-30 PROCEDURE — 83036 HEMOGLOBIN GLYCOSYLATED A1C: CPT

## 2019-04-30 PROCEDURE — 80061 LIPID PANEL: CPT

## 2019-05-06 DIAGNOSIS — J20.9 ACUTE BRONCHITIS, UNSPECIFIED ORGANISM: ICD-10-CM

## 2019-05-06 RX ORDER — FLUTICASONE PROPIONATE 50 MCG
SPRAY, SUSPENSION (ML) NASAL
Qty: 1 BOTTLE | Refills: 0 | Status: SHIPPED | OUTPATIENT
Start: 2019-05-06 | End: 2019-06-10 | Stop reason: SDUPTHER

## 2019-05-12 DIAGNOSIS — M62.838 MUSCLE SPASM: ICD-10-CM

## 2019-05-13 RX ORDER — NAPROXEN 500 MG/1
TABLET ORAL
Qty: 30 TABLET | Refills: 0 | Status: SHIPPED | OUTPATIENT
Start: 2019-05-13 | End: 2019-12-10 | Stop reason: ALTCHOICE

## 2019-05-25 DIAGNOSIS — J30.9 ALLERGIC RHINITIS, UNSPECIFIED SEASONALITY, UNSPECIFIED TRIGGER: ICD-10-CM

## 2019-05-26 RX ORDER — FEXOFENADINE HYDROCHLORIDE 60 MG/1
TABLET, FILM COATED ORAL
Qty: 30 TABLET | Refills: 0 | Status: SHIPPED | OUTPATIENT
Start: 2019-05-26 | End: 2019-10-18

## 2019-05-28 ENCOUNTER — OFFICE VISIT (OUTPATIENT)
Dept: FAMILY MEDICINE CLINIC | Facility: CLINIC | Age: 49
End: 2019-05-28

## 2019-05-28 VITALS
HEART RATE: 78 BPM | SYSTOLIC BLOOD PRESSURE: 140 MMHG | TEMPERATURE: 97.8 F | RESPIRATION RATE: 16 BRPM | WEIGHT: 200.4 LBS | HEIGHT: 63 IN | DIASTOLIC BLOOD PRESSURE: 90 MMHG | BODY MASS INDEX: 35.51 KG/M2

## 2019-05-28 DIAGNOSIS — F34.1 DYSTHYMIA: ICD-10-CM

## 2019-05-28 DIAGNOSIS — F17.200 TOBACCO DEPENDENCE: Primary | ICD-10-CM

## 2019-05-28 PROCEDURE — 99213 OFFICE O/P EST LOW 20 MIN: CPT | Performed by: FAMILY MEDICINE

## 2019-05-28 RX ORDER — BUPROPION HYDROCHLORIDE 150 MG/1
150 TABLET, EXTENDED RELEASE ORAL 2 TIMES DAILY
Qty: 60 TABLET | Refills: 1 | Status: CANCELLED | OUTPATIENT
Start: 2019-05-28

## 2019-05-28 RX ORDER — BUPROPION HYDROCHLORIDE 150 MG/1
TABLET, EXTENDED RELEASE ORAL
Qty: 60 TABLET | Refills: 0 | Status: SHIPPED | OUTPATIENT
Start: 2019-05-28 | End: 2019-06-19 | Stop reason: SDUPTHER

## 2019-05-30 ENCOUNTER — TELEPHONE (OUTPATIENT)
Dept: FAMILY MEDICINE CLINIC | Facility: CLINIC | Age: 49
End: 2019-05-30

## 2019-06-09 DIAGNOSIS — E78.5 DYSLIPIDEMIA: ICD-10-CM

## 2019-06-10 DIAGNOSIS — J20.9 ACUTE BRONCHITIS, UNSPECIFIED ORGANISM: ICD-10-CM

## 2019-06-10 RX ORDER — FLUTICASONE PROPIONATE 50 MCG
SPRAY, SUSPENSION (ML) NASAL
Qty: 1 BOTTLE | Refills: 1 | Status: SHIPPED | OUTPATIENT
Start: 2019-06-10 | End: 2019-08-04 | Stop reason: SDUPTHER

## 2019-06-10 RX ORDER — ATORVASTATIN CALCIUM 40 MG/1
TABLET, FILM COATED ORAL
Qty: 30 TABLET | Refills: 5 | Status: SHIPPED | OUTPATIENT
Start: 2019-06-10 | End: 2019-12-07 | Stop reason: SDUPTHER

## 2019-06-19 DIAGNOSIS — F17.200 TOBACCO DEPENDENCE: ICD-10-CM

## 2019-06-20 RX ORDER — BUPROPION HYDROCHLORIDE 150 MG/1
TABLET, EXTENDED RELEASE ORAL
Qty: 60 TABLET | Refills: 1 | Status: SHIPPED | OUTPATIENT
Start: 2019-06-20 | End: 2019-08-24 | Stop reason: SDUPTHER

## 2019-08-04 DIAGNOSIS — J20.9 ACUTE BRONCHITIS, UNSPECIFIED ORGANISM: ICD-10-CM

## 2019-08-05 RX ORDER — FLUTICASONE PROPIONATE 50 MCG
SPRAY, SUSPENSION (ML) NASAL
Qty: 16 ML | Refills: 1 | Status: SHIPPED | OUTPATIENT
Start: 2019-08-05 | End: 2019-12-10 | Stop reason: ALTCHOICE

## 2019-08-24 DIAGNOSIS — F17.200 TOBACCO DEPENDENCE: ICD-10-CM

## 2019-08-26 RX ORDER — BUPROPION HYDROCHLORIDE 150 MG/1
TABLET, EXTENDED RELEASE ORAL
Qty: 60 TABLET | Refills: 1 | Status: SHIPPED | OUTPATIENT
Start: 2019-08-26 | End: 2019-10-27 | Stop reason: SDUPTHER

## 2019-10-18 ENCOUNTER — OFFICE VISIT (OUTPATIENT)
Dept: FAMILY MEDICINE CLINIC | Facility: CLINIC | Age: 49
End: 2019-10-18

## 2019-10-18 VITALS
RESPIRATION RATE: 16 BRPM | SYSTOLIC BLOOD PRESSURE: 148 MMHG | WEIGHT: 194.4 LBS | DIASTOLIC BLOOD PRESSURE: 86 MMHG | BODY MASS INDEX: 34.44 KG/M2 | HEART RATE: 80 BPM | TEMPERATURE: 97.6 F

## 2019-10-18 DIAGNOSIS — J06.9 UPPER RESPIRATORY TRACT INFECTION, UNSPECIFIED TYPE: Primary | ICD-10-CM

## 2019-10-18 DIAGNOSIS — R03.0 ELEVATED BLOOD PRESSURE READING: ICD-10-CM

## 2019-10-18 PROCEDURE — 99213 OFFICE O/P EST LOW 20 MIN: CPT | Performed by: FAMILY MEDICINE

## 2019-10-18 RX ORDER — HYDRALAZINE HYDROCHLORIDE 10 MG/1
10 TABLET, FILM COATED ORAL 3 TIMES DAILY
COMMUNITY
End: 2019-12-10 | Stop reason: ALTCHOICE

## 2019-10-18 RX ORDER — TERBINAFINE HYDROCHLORIDE 1 G/100G
CREAM TOPICAL
Refills: 2 | COMMUNITY
Start: 2019-08-07 | End: 2019-12-10 | Stop reason: ALTCHOICE

## 2019-10-18 NOTE — ASSESSMENT & PLAN NOTE
- BP initially 160/108, repeat after rest 148/86, patient asymptomatic   - Continue antihypertensive regimen and home BP monitoring

## 2019-10-18 NOTE — PROGRESS NOTES
Assessment/Plan:       Problem List Items Addressed This Visit        Respiratory    Upper respiratory tract infection - Primary     - Supportive care discussed including rest, hydration, OTC medications to avoid that could potentially increase her BP discussed  - Return precautions discussed             Other    Elevated blood pressure reading     - BP initially 160/108, repeat after rest 148/86, patient asymptomatic   - Continue antihypertensive regimen and home BP monitoring                  Subjective:      Patient ID: Shruti Purvis is a 52 y o  female presenting for nasal congestion, sore throat, ear pain  HPI  The patient presents with nasal congestion, sore throat and bilateral ear pain that began about 3-4 days ago  She denies any fevers or chills, denies any shortness of breath  She admits to a productive cough with yellow sputum  She admits to some nausea, denies any abdominal pain, vomiting, or diarrhea  She denies any difficulty swallowing or decrease in appetite  She has a positive sick contact in family member with recent URI symptoms  She feels like her nasal congestion is getting worse so she decided to come in  When asked about her elevated blood pressure reading, she states she monitors it at home and she rarely sees numbers above 140/90  The following portions of the patient's history were reviewed and updated as appropriate: allergies, current medications, past family history, past medical history, past social history, past surgical history and problem list     Review of Systems   Constitutional: Negative for activity change, appetite change, chills and fever  HENT: Positive for congestion, ear pain, postnasal drip, rhinorrhea and sore throat  Negative for ear discharge, trouble swallowing and voice change  Eyes: Negative for discharge and redness  Respiratory: Positive for cough  Negative for chest tightness, shortness of breath and wheezing      Cardiovascular: Positive for chest pain (only with coughing )  Negative for palpitations and leg swelling  Gastrointestinal: Positive for nausea  Negative for abdominal distention, abdominal pain, constipation, diarrhea and vomiting  Genitourinary: Negative for decreased urine volume and difficulty urinating  Skin: Negative for pallor and rash  Neurological: Negative for dizziness, light-headedness and headaches  Hematological: Negative for adenopathy  Objective:      /86   Pulse 80   Temp 97 6 °F (36 4 °C) (Tympanic)   Resp 16   Wt 88 2 kg (194 lb 6 4 oz)   LMP 01/01/2016   BMI 34 44 kg/m²          Physical Exam   Constitutional: She is oriented to person, place, and time  She appears well-developed and well-nourished  No distress  HENT:   Head: Normocephalic and atraumatic  Right Ear: External ear normal    Left Ear: External ear normal    Nose: Nose normal    Mouth/Throat: Oropharynx is clear and moist  No oropharyngeal exudate  Slightly erythematous posterior pharynx, no tonsillar exudates  TM clear bilaterally  Eyes: Pupils are equal, round, and reactive to light  Conjunctivae and EOM are normal  Right eye exhibits no discharge  Left eye exhibits no discharge  Neck: Normal range of motion  Neck supple  Cardiovascular: Normal rate, regular rhythm, normal heart sounds and intact distal pulses  No murmur heard  Pulmonary/Chest: Effort normal and breath sounds normal  No stridor  No respiratory distress  She has no wheezes  She has no rales  Musculoskeletal: She exhibits no edema  Lymphadenopathy:     She has no cervical adenopathy  Neurological: She is alert and oriented to person, place, and time  She exhibits normal muscle tone  Skin: Skin is warm  Capillary refill takes less than 2 seconds  No rash noted  She is not diaphoretic  Psychiatric: She has a normal mood and affect  Her behavior is normal    Vitals reviewed        Christiano Moreno DO PGY-3   [address]

## 2019-10-18 NOTE — ASSESSMENT & PLAN NOTE
- Supportive care discussed including rest, hydration, OTC medications to avoid that could potentially increase her BP discussed  - Return precautions discussed

## 2019-10-23 ENCOUNTER — OFFICE VISIT (OUTPATIENT)
Dept: FAMILY MEDICINE CLINIC | Facility: CLINIC | Age: 49
End: 2019-10-23

## 2019-10-23 ENCOUNTER — TELEPHONE (OUTPATIENT)
Dept: FAMILY MEDICINE CLINIC | Facility: CLINIC | Age: 49
End: 2019-10-23

## 2019-10-23 VITALS
TEMPERATURE: 98.4 F | BODY MASS INDEX: 34.3 KG/M2 | WEIGHT: 193.6 LBS | RESPIRATION RATE: 18 BRPM | HEART RATE: 78 BPM | HEIGHT: 63 IN | SYSTOLIC BLOOD PRESSURE: 120 MMHG | DIASTOLIC BLOOD PRESSURE: 80 MMHG

## 2019-10-23 DIAGNOSIS — Z87.891 HISTORY OF TOBACCO ABUSE: ICD-10-CM

## 2019-10-23 DIAGNOSIS — J20.8 ACUTE BRONCHITIS DUE TO OTHER SPECIFIED ORGANISMS: Primary | ICD-10-CM

## 2019-10-23 PROCEDURE — 99213 OFFICE O/P EST LOW 20 MIN: CPT | Performed by: NURSE PRACTITIONER

## 2019-10-23 RX ORDER — PREDNISONE 20 MG/1
20 TABLET ORAL 2 TIMES DAILY WITH MEALS
Qty: 10 TABLET | Refills: 0 | Status: SHIPPED | OUTPATIENT
Start: 2019-10-23 | End: 2019-12-10 | Stop reason: ALTCHOICE

## 2019-10-23 RX ORDER — AZITHROMYCIN 250 MG/1
TABLET, FILM COATED ORAL
Qty: 6 TABLET | Refills: 0 | Status: SHIPPED | OUTPATIENT
Start: 2019-10-23 | End: 2019-10-28

## 2019-10-23 NOTE — PROGRESS NOTES
Assessment/Plan:    Acute bronchitis due to other specified organisms  Most likely had stage 1 COPD due to 35 year smoking history and quit 3 months ago  States gets frequent bouts of bronchitis  Will RX Z-violeta  parvin RS 5 days of prednisone  Robitussin    History of tobacco abuse  Stopped 3 months ago  Will get spirometry to assess for COPD         Problem List Items Addressed This Visit        Respiratory    Acute bronchitis due to other specified organisms - Primary     Most likely had stage 1 COPD due to 35 year smoking history and quit 3 months ago  States gets frequent bouts of bronchitis  Will RX Z-violeta  parvin RS 5 days of prednisone  Robitussin         Relevant Medications    azithromycin (ZITHROMAX) 250 mg tablet    predniSONE 20 mg tablet    Other Relevant Orders    Complete PFT with post bronchodilator       Other    History of tobacco abuse     Stopped 3 months ago  Will get spirometry to assess for COPD                 Subjective:      Patient ID: Kimberly Pelaez is a 52 y o  female  52year old presents with cough and SOB for the past 2 weeks  She is using SAB 3-4 times a day  Smoking history since age 12 years but quit smoking 3 months ago  The following portions of the patient's history were reviewed and updated as appropriate: allergies, current medications, past family history, past medical history, past social history, past surgical history and problem list     Review of Systems   Constitutional: Negative  Respiratory: Positive for cough (green mucus)  Cardiovascular: Negative  Allergic/Immunologic: Positive for environmental allergies (bees)  All other systems reviewed and are negative          Objective:      /80 (BP Location: Right arm, Patient Position: Sitting, Cuff Size: Large)   Pulse 78   Temp 98 4 °F (36 9 °C) (Tympanic)   Resp 18   Ht 5' 3" (1 6 m)   Wt 87 8 kg (193 lb 9 6 oz)   LMP 01/01/2016   BMI 34 29 kg/m²          Physical Exam   Constitutional: She is oriented to person, place, and time  She appears well-developed and well-nourished  HENT:   Head: Normocephalic and atraumatic  Right Ear: External ear normal    Left Ear: External ear normal    Nose: Nose normal    Mouth/Throat: Oropharynx is clear and moist    Eyes: Pupils are equal, round, and reactive to light  EOM are normal    Neck: Normal range of motion  Neck supple  Cardiovascular: Normal rate, regular rhythm and normal heart sounds  Pulmonary/Chest: Effort normal  She has wheezes  Neurological: She is alert and oriented to person, place, and time  Skin: Skin is warm and dry  Psychiatric: She has a normal mood and affect   Her behavior is normal

## 2019-10-23 NOTE — TELEPHONE ENCOUNTER
Patient was in to see Dr Crow Dominguez last Friday the 18th  She states,  She's still not better  With cough and congestion,  was hoping to have something called to [facility] [address]

## 2019-10-23 NOTE — TELEPHONE ENCOUNTER
Spoke with patient, she states she coughs all day and all night with occasional wheezing, she also c/o green/yellow phlem, nasal congestion  She states she doesn't usually get fevers but does feel feverish  She has tried supportive care that was discussed at visit, but symptoms are not improving

## 2019-10-23 NOTE — ASSESSMENT & PLAN NOTE
Most likely had stage 1 COPD due to 35 year smoking history and quit 3 months ago  States gets frequent bouts of bronchitis  Will RX Nelda melendrez RS 5 days of prednisone  Robitussin

## 2019-10-23 NOTE — PATIENT INSTRUCTIONS
Take medication as directed   Robitussin DM  Make appointment for breathing test  Use rescue inhaler as needed

## 2019-10-23 NOTE — TELEPHONE ENCOUNTER
Could we ask the patient to come in for a visit? She may need antibiotics however the antibiotic choice will depend on her exam (sinusitis versus pneumonia for example) and we should assess her asthma given wheezing, so I would recommend that she is seen today before anything is prescribed  Thanks!

## 2019-10-27 DIAGNOSIS — F17.200 TOBACCO DEPENDENCE: ICD-10-CM

## 2019-10-28 RX ORDER — BUPROPION HYDROCHLORIDE 150 MG/1
TABLET, EXTENDED RELEASE ORAL
Qty: 60 TABLET | Refills: 1 | Status: SHIPPED | OUTPATIENT
Start: 2019-10-28 | End: 2019-12-10 | Stop reason: ALTCHOICE

## 2019-11-12 ENCOUNTER — OFFICE VISIT (OUTPATIENT)
Dept: FAMILY MEDICINE CLINIC | Facility: CLINIC | Age: 49
End: 2019-11-12

## 2019-11-12 VITALS
HEART RATE: 78 BPM | WEIGHT: 190.2 LBS | SYSTOLIC BLOOD PRESSURE: 120 MMHG | HEIGHT: 63 IN | DIASTOLIC BLOOD PRESSURE: 90 MMHG | BODY MASS INDEX: 33.7 KG/M2 | RESPIRATION RATE: 18 BRPM | TEMPERATURE: 97.4 F

## 2019-11-12 DIAGNOSIS — J06.9 VIRAL URI WITH COUGH: ICD-10-CM

## 2019-11-12 DIAGNOSIS — J45.21 MILD INTERMITTENT ASTHMATIC BRONCHITIS WITH ACUTE EXACERBATION: ICD-10-CM

## 2019-11-12 DIAGNOSIS — F32.A DEPRESSION, UNSPECIFIED DEPRESSION TYPE: Primary | ICD-10-CM

## 2019-11-12 PROCEDURE — 1036F TOBACCO NON-USER: CPT | Performed by: FAMILY MEDICINE

## 2019-11-12 PROCEDURE — 3008F BODY MASS INDEX DOCD: CPT | Performed by: FAMILY MEDICINE

## 2019-11-12 PROCEDURE — 99214 OFFICE O/P EST MOD 30 MIN: CPT | Performed by: FAMILY MEDICINE

## 2019-11-12 RX ORDER — SERTRALINE HYDROCHLORIDE 25 MG/1
25 TABLET, FILM COATED ORAL DAILY
Qty: 30 TABLET | Refills: 1 | Status: SHIPPED | OUTPATIENT
Start: 2019-11-12 | End: 2020-01-14 | Stop reason: SDUPTHER

## 2019-11-12 RX ORDER — ALBUTEROL SULFATE 90 UG/1
AEROSOL, METERED RESPIRATORY (INHALATION)
Qty: 8.5 INHALER | Refills: 0 | Status: SHIPPED | OUTPATIENT
Start: 2019-11-12 | End: 2020-09-10 | Stop reason: SDUPTHER

## 2019-11-12 NOTE — ASSESSMENT & PLAN NOTE
Cough, rhinorrhea, congestion  Educated patient on likely viral nature of symptoms  Instructed to finish course of doxycycline prescribed by urgent care  Discussed trial of zyrtec for symptomatic improvement

## 2019-11-12 NOTE — ASSESSMENT & PLAN NOTE
Will start zoloft 25mg daily  Educated regarding delayed onset of SSRI efficacy  Instructed to continue wellbyutrin 125 mg BID  Reassessment in 4 weeks

## 2019-11-12 NOTE — PROGRESS NOTES
Assessment/Plan:    Viral URI with cough  Cough, rhinorrhea, congestion  Educated patient on likely viral nature of symptoms  Instructed to finish course of doxycycline prescribed by urgent care  Discussed trial of zyrtec for symptomatic improvement  Depression  Will start zoloft 25mg daily  Educated regarding delayed onset of SSRI efficacy  Instructed to continue wellbyutrin 125 mg BID  Reassessment in 4 weeks  Problem List Items Addressed This Visit        Respiratory    Viral URI with cough     Cough, rhinorrhea, congestion  Educated patient on likely viral nature of symptoms  Instructed to finish course of doxycycline prescribed by urgent care  Discussed trial of zyrtec for symptomatic improvement  Other    Depression - Primary     Will start zoloft 25mg daily  Educated regarding delayed onset of SSRI efficacy  Instructed to continue wellbyutrin 125 mg BID  Reassessment in 4 weeks  Relevant Medications    sertraline (ZOLOFT) 25 mg tablet      Other Visit Diagnoses     Mild intermittent asthmatic bronchitis with acute exacerbation        Relevant Medications    albuterol (PROAIR HFA) 90 mcg/act inhaler            Subjective:      Patient ID: Don Phan is a 52 y o  female  Don Phan is a 52y o  year old female with PMH of depression, tobacco use presenting to the [address] [address] for cough, congestion and depression  Patient has had four days of persistent productive cough, rhinorrhea and congestion  Intermittent fevers with Tmax 101F at home one day ago  Patient was seen at urgent care four days ago and prescribed doxycycline  Symptoms have been slowly improving with doxycycline but cough has persisted  Intermittent headache in bilateral, posterior occiput which does not awaken from sleep and responds to motrin  Patient denies chest pain, dyspnea, leg pain/swelling  Patient also notes persistence of depression   Patient has been taking bupropion 125mg BID for past six months but states she would like to achieve improved mood  Patient states she is dealing with emotional stress at home stemming from caring for grandson and confrontations with her daughter  Subjective decrease in energy level, decrease in interest, feelings of guilt and difficulty falling asleep  Denies SI/HI  Smoking "a couple of cigarettes" per day  Depression   This is a chronic problem  The current episode started more than 1 month ago  The problem occurs daily  The problem has been gradually worsening  Associated symptoms include congestion, coughing, fatigue, headaches and nausea  Pertinent negatives include no abdominal pain, arthralgias, chest pain, chills, fever, rash, sore throat or vomiting  Treatments tried: bupropion  The treatment provided moderate relief  URI    This is a new problem  The current episode started in the past 7 days  The problem has been gradually improving  The maximum temperature recorded prior to her arrival was 101 - 101 9 F  The fever has been present for 1 to 2 days  Associated symptoms include congestion, coughing, headaches, nausea and rhinorrhea  Pertinent negatives include no abdominal pain, chest pain, diarrhea, dysuria, ear pain, rash, sinus pain, sneezing, sore throat, vomiting or wheezing  Treatments tried: doxycycline  The treatment provided moderate relief  The following portions of the patient's history were reviewed and updated as appropriate: allergies, current medications, past family history, past medical history, past social history and problem list     Review of Systems   Constitutional: Positive for fatigue  Negative for chills and fever  HENT: Positive for congestion and rhinorrhea  Negative for ear pain, sinus pain, sneezing and sore throat  Eyes: Negative for photophobia and visual disturbance  Respiratory: Positive for cough  Negative for choking, chest tightness, shortness of breath and wheezing  Cardiovascular: Negative for chest pain and leg swelling  Gastrointestinal: Positive for nausea  Negative for abdominal distention, abdominal pain, constipation, diarrhea and vomiting  Endocrine: Negative for polyuria  Genitourinary: Negative for difficulty urinating, dysuria, flank pain and hematuria  Musculoskeletal: Negative for arthralgias  Skin: Negative for rash  Neurological: Positive for headaches  Negative for seizures, syncope and light-headedness  Psychiatric/Behavioral: Positive for decreased concentration, depression and dysphoric mood  Negative for behavioral problems, confusion, hallucinations, self-injury and suicidal ideas  All other systems reviewed and are negative  Objective:      /90   Pulse 78   Temp (!) 97 4 °F (36 3 °C)   Resp 18   Ht 5' 3" (1 6 m)   Wt 86 3 kg (190 lb 3 2 oz)   LMP 01/01/2016   BMI 33 69 kg/m²          Physical Exam   Constitutional: She is oriented to person, place, and time  She appears well-developed and well-nourished  No distress  HENT:   Head: Normocephalic and atraumatic  Right Ear: External ear normal    Left Ear: External ear normal    Mouth/Throat: Oropharynx is clear and moist  No oropharyngeal exudate  Eyes: Conjunctivae are normal  Right eye exhibits no discharge  Left eye exhibits no discharge  Neck: Neck supple  Cardiovascular: Normal rate and regular rhythm  Pulmonary/Chest: Effort normal and breath sounds normal  No respiratory distress  She has no wheezes  She has no rales  She exhibits no tenderness  Abdominal: Soft  Bowel sounds are normal  She exhibits no distension  There is no tenderness  There is no rebound and no guarding  Lymphadenopathy:     She has no cervical adenopathy  Neurological: She is alert and oriented to person, place, and time  Skin: Capillary refill takes less than 2 seconds  No rash noted  She is not diaphoretic     Psychiatric: Her speech is normal and behavior is normal  Judgment normal  She is not actively hallucinating  Thought content is not paranoid and not delusional  Cognition and memory are normal  She exhibits a depressed mood  She expresses no homicidal and no suicidal ideation  She is attentive  Nursing note and vitals reviewed

## 2019-11-12 NOTE — LETTER
November 12, 2019     Patient: Kalpana Hill   YOB: 1970   Date of Visit: 11/12/2019       To Whom it May Concern:    Kalpana Hill is under my professional care  She was seen in my office on 11/12/2019  She may return to work on 11/13/2019  If you have any questions or concerns, please don't hesitate to call  Sincerely,      Amanda GOLDBERG      Resident [facility]        CC: No Recipients

## 2019-11-16 ENCOUNTER — TELEPHONE (OUTPATIENT)
Dept: OTHER | Facility: OTHER | Age: 49
End: 2019-11-16

## 2019-11-16 NOTE — TELEPHONE ENCOUNTER
Shruti Purvis 1970  CONFIDENTIALTY NOTICE: This fax transmission is intended only for the addressee  It contains information that is legally privileged,  confidential or otherwise protected from use or disclosure  If you are not the intended recipient, you are strictly prohibited from reviewing,  disclosing, copying using or disseminating any of this information or taking any action in reliance on or regarding this information  If you have  received this fax in error, please notify us immediately by telephone so that we can arrange for its return to us  Page:  2  Call Id: [id number]  Health Call  Standard Call Report  Health Call  Patient Name: Shruti Purvis  Gender: Female  : 1970  Age: 52 Y 6 M 29 D  Return Phone  Number: [phone] (Home)  Address: [address]  City/State/Zip: [address] [place] [address]  Practice Name: [address]  Practice Charged:  Physician:  [facility] Name:  Relationship To  Patient: Self  Return Phone Number: [phone] (Home)  Presenting Problem: "My glands are swollen "  Service Type: Triage  Charged Service 1: N/A  Pharmacy Name and  Number:  Nurse Assessment  Nurse: Daren Kawasaki Date/Time: 2019 7:54:21 AM  Type of assessment required:  ---General (Adult or Child)  Duration of Current S/S  ---Started   Location/Radiation  ---Throat / Respiratory  Temperature (F) and route:  ---Denies fever  Symptom Specific Meds (Dose/Time):  ---Advil (2) 200mg @ 0645  Other S/S  ---Runny nose with clear drainage, nasal congestion, sore throat, productive cough  but can't cough it out due to swollen tonsils and glands that pt  woke up today  Denies  difficulty breathing or swallowing  Pain Scale on scale of 1-10, 10 being the worst:  ---4 out of 10  Symptom progression:  ---worse  Intake and Output  ---Decreased appetite but is drinking fluids well    Shruti Purvis 1970  CONFIDENTIALTY NOTICE: This fax transmission is intended only for the addressee  It contains information that is legally privileged,  confidential or otherwise protected from use or disclosure  If you are not the intended recipient, you are strictly prohibited from reviewing,  disclosing, copying using or disseminating any of this information or taking any action in reliance on or regarding this information  If you have  received this fax in error, please notify us immediately by telephone so that we can arrange for its return to us  Page: 2 of 2  Call Id: [id number]  Nurse Assessment  LMP/ Pregnancy:  ---3 - 4 years ago / Denies pregnancy  Breastfeeding  ---No  Last Exam/Treatment:  ---11/12/2019 for URI  Protocols  Protocol Title Nurse Date/Time  Lymph Nodes Swollen Helio Hernandez RN, Cleopatra Oliva 11/16/2019 8:06:40 AM  Question Caller Affirmed  Disp  Time Disposition Final User  11/16/2019 8:08:17 AM Call PCP Now Helio Hernandez RN, Dorien Loyal  11/16/2019 8:10:56 AM RN Triaged Yes Helio Hernandez RN, Cleopatra Oliva  Disposition Overriden: See Physician within 24 Hours  Override Reason: Specify reason  (Please document in 'advice recommended' section)  Care Advice Given Per Protocol  SEE PHYSICIAN WITHIN 24 HOURS: PAIN MEDICINES: * For pain relief, take acetaminophen, ibuprofen, or naproxen  * Use the  lowest amount that makes your pain feel better  IBUPROFEN (E G , MOTRIN, ADVIL): * Take 400 mg (two 200 mg pills) by mouth  every 6 hours as needed  * Another choice is to take 600 mg (three 200 mg pills) by mouth every 8 hours as needed  * The most you  should take each day is 1,200 mg (six 200 mg pills a day), unless your doctor has told you to take more  SORE THROAT - For relief of  sore throat: * Sip warm chicken broth or apple juice  * Suck on hard candy or a throat lozenge (OTC)  * Gargle with warm salt water four  times a day  To make salt water, put 1/2 teaspoon of salt in 8 oz (240 ml) of warm water  * Avoid cigarette smoke  SOFT DIET: * Eat a  soft diet   * Cold drinks, popsicles, and milk shakes are especially good  Avoid citrus fruits  DRINK PLENTY LIQUIDS: * Drink plenty  of liquids  This is important to prevent dehydration  * A healthy adult should drink 8 cups (240 ml) or more of liquid each day  * How can  you tell if you are drinking enough liquids? The goal is to keep the urine clear or light-yellow in color  If your urine is bright yellow or  dark yellow, you are probably not drinking enough liquids  * Caution: Some medical problems require fluid restriction  CALL BACK IF:  * You become worse  CARE ADVICE given per Lymph Nodes Swollen (Adult) guideline  Caller Understands: Yes  Caller Disagree/Comply: Comply  PreDisposition: Unsure  Comments  User: Michael Carney RN Date/Time: 11/16/2019 8:10:48 AM  Patient was seen in the office during the week as well as [facility]  Patient was given doxycycline at [facility]  Patient  took one dose and did not like how it made her feel  Since Dr Kaiser Perez is on and was recently seen, I called and spoke to her in  regards to this  Dr Kaiser Perez recommended that if patient was very concerned, she should go to the ER  I called patient back and  and we discussed  Patient stated that she was going to go back to [facility] today

## 2019-11-20 ENCOUNTER — HOSPITAL ENCOUNTER (OUTPATIENT)
Dept: PULMONOLOGY | Facility: HOSPITAL | Age: 49
Discharge: HOME | End: 2019-11-20
Payer: COMMERCIAL

## 2019-11-20 DIAGNOSIS — J20.8 ACUTE BRONCHITIS DUE TO OTHER SPECIFIED ORGANISMS: ICD-10-CM

## 2019-11-20 PROCEDURE — 94060 EVALUATION OF WHEEZING: CPT | Performed by: INTERNAL MEDICINE

## 2019-11-20 PROCEDURE — 94729 DIFFUSING CAPACITY: CPT | Performed by: INTERNAL MEDICINE

## 2019-11-20 PROCEDURE — 94760 N-INVAS EAR/PLS OXIMETRY 1: CPT

## 2019-11-20 PROCEDURE — 94726 PLETHYSMOGRAPHY LUNG VOLUMES: CPT

## 2019-11-20 PROCEDURE — 94729 DIFFUSING CAPACITY: CPT

## 2019-11-20 PROCEDURE — 94726 PLETHYSMOGRAPHY LUNG VOLUMES: CPT | Performed by: INTERNAL MEDICINE

## 2019-11-20 PROCEDURE — 94060 EVALUATION OF WHEEZING: CPT

## 2019-11-20 RX ORDER — ALBUTEROL SULFATE 2.5 MG/3ML
2.5 SOLUTION RESPIRATORY (INHALATION) ONCE
Status: COMPLETED | OUTPATIENT
Start: 2019-11-20 | End: 2019-11-20

## 2019-11-20 RX ADMIN — ALBUTEROL SULFATE 2.5 MG: 2.5 SOLUTION RESPIRATORY (INHALATION) at 17:07

## 2019-12-07 DIAGNOSIS — E78.5 DYSLIPIDEMIA: ICD-10-CM

## 2019-12-07 RX ORDER — ATORVASTATIN CALCIUM 40 MG/1
TABLET, FILM COATED ORAL
Qty: 30 TABLET | Refills: 5 | Status: SHIPPED | OUTPATIENT
Start: 2019-12-07 | End: 2020-04-02

## 2019-12-10 ENCOUNTER — OFFICE VISIT (OUTPATIENT)
Dept: FAMILY MEDICINE CLINIC | Facility: CLINIC | Age: 49
End: 2019-12-10

## 2019-12-10 VITALS
WEIGHT: 192.4 LBS | HEIGHT: 63 IN | DIASTOLIC BLOOD PRESSURE: 80 MMHG | TEMPERATURE: 98.1 F | SYSTOLIC BLOOD PRESSURE: 120 MMHG | RESPIRATION RATE: 16 BRPM | BODY MASS INDEX: 34.09 KG/M2 | HEART RATE: 78 BPM

## 2019-12-10 DIAGNOSIS — J20.8 ACUTE BRONCHITIS DUE TO OTHER SPECIFIED ORGANISMS: ICD-10-CM

## 2019-12-10 DIAGNOSIS — I10 ESSENTIAL HYPERTENSION: ICD-10-CM

## 2019-12-10 DIAGNOSIS — F17.200 SMOKING: Primary | ICD-10-CM

## 2019-12-10 DIAGNOSIS — F32.A DEPRESSION, UNSPECIFIED DEPRESSION TYPE: ICD-10-CM

## 2019-12-10 LAB
CREAT UR-MCNC: 183 MG/DL
MICROALBUMIN UR-MCNC: 13 MG/L (ref 0–20)
MICROALBUMIN/CREAT 24H UR: 7 MG/G CREATININE (ref 0–30)

## 2019-12-10 PROCEDURE — 3074F SYST BP LT 130 MM HG: CPT | Performed by: FAMILY MEDICINE

## 2019-12-10 PROCEDURE — 99213 OFFICE O/P EST LOW 20 MIN: CPT | Performed by: FAMILY MEDICINE

## 2019-12-10 PROCEDURE — 3079F DIAST BP 80-89 MM HG: CPT | Performed by: FAMILY MEDICINE

## 2019-12-10 PROCEDURE — 82570 ASSAY OF URINE CREATININE: CPT | Performed by: FAMILY MEDICINE

## 2019-12-10 PROCEDURE — 3008F BODY MASS INDEX DOCD: CPT | Performed by: FAMILY MEDICINE

## 2019-12-10 PROCEDURE — 82043 UR ALBUMIN QUANTITATIVE: CPT | Performed by: FAMILY MEDICINE

## 2019-12-10 RX ORDER — HYDROCHLOROTHIAZIDE 12.5 MG/1
12.5 TABLET ORAL DAILY
Qty: 90 TABLET | Refills: 3 | Status: SHIPPED | OUTPATIENT
Start: 2019-12-10 | End: 2020-01-14

## 2019-12-10 RX ORDER — VARENICLINE TARTRATE 25 MG
KIT ORAL
Qty: 53 TABLET | Refills: 0 | Status: SHIPPED | OUTPATIENT
Start: 2019-12-10 | End: 2020-01-14 | Stop reason: SDUPTHER

## 2019-12-10 NOTE — PATIENT INSTRUCTIONS
Varenicline (By mouth)   Varenicline ([id number])  Helps you quit smoking, as part of a support program    Brand Name(s): Chantix, Chantix Starting Month Pak   There may be other brand names for this medicine  When This Medicine Should Not Be Used: This medicine is not right for everyone  Do not use it if you had an allergic reaction to varenicline  How to Use This Medicine:   Tablet  · Take your medicine as directed  Your dose or schedule may need to be changed to find what works best for you  · Tell your doctor what date you have set to stop smoking  This medicine needs to be started 1 week before that date  · It is best to take this medicine with a full glass of water after you eat  · This medicine should come with a Medication Guide  Ask your pharmacist for a copy if you do not have one  · Missed dose: Take a dose as soon as you remember  If it is almost time for your next dose, wait until then and take a regular dose  Do not take extra medicine to make up for a missed dose  · Store the medicine in a closed container at room temperature, away from heat, moisture, and direct light  Drugs and Foods to Avoid:   Ask your doctor or pharmacist before using any other medicine, including over-the-counter medicines, vitamins, and herbal products  · Some medicines can affect how varenicline works  Tell your doctor if you are using any of the following:  ¨ Insulin  ¨ Theophylline  ¨ Blood thinner (including warfarin)  · This medicine can affect your ability to tolerate alcohol  Limit the amount of alcohol that you drink until you know how this medicine affects you  Warnings While Using This Medicine:   · Tell your doctor if you are pregnant or breastfeeding, or if you have kidney disease, heart or blood vessel problems, angina, or a history of heart attack, stroke, depression or mental health problems, or seizures  · For some people, this medicine may increase mental or emotional problems   This may lead to thoughts of suicide and violence  Talk with your doctor right away if you have any thought or behavior changes that concern you  Tell your doctor if you or anyone in your family has a history of bipolar disorder or suicide attempts  · This medicine may cause the following problems:  ¨ Increased risk of heart attack or stroke  ¨ Serious skin reactions  ¨ Sleepwalking  · This medicine may cause you to become dizzy or drowsy, or have trouble concentrating  Do not drive or do anything else that could be dangerous until you know this medicine affects you  · Your doctor will check your progress and the effects of this medicine at regular visits  Keep all appointments  · Keep all medicine out of the reach of children  Never share your medicine with anyone  Possible Side Effects While Using This Medicine:   Call your doctor right away if you notice any of these side effects:  · Allergic reaction: Itching or hives, swelling in your face or hands, swelling or tingling in your mouth or throat, chest tightness, trouble breathing  · Blistering, peeling, red skin rash  · Chest pain, fast, pounding, or uneven heartbeat  · Feeling anxious, depressed, restless, or irritable  · Numbness or weakness on one side of your body, sudden or severe headache, problems with vision, speech, or walking  · Seeing or hearing things that are not really there  · Seizures  · Thoughts of hurting yourself or others, unusual moods or behaviors  If you notice these less serious side effects, talk with your doctor:   · Headache  · Nausea, gas  · Trouble sleeping, unusual dreams, sleepwalking  If you notice other side effects that you think are caused by this medicine, tell your doctor  Call your doctor for medical advice about side effects  You may report side effects to FDA at 5-603-FDA-3491  © 2017 [employer] Information is for End User's use only and may not be sold, redistributed or otherwise used for commercial purposes    The above information is an  only  It is not intended as medical advice for individual conditions or treatments  Talk to your doctor, nurse or pharmacist before following any medical regimen to see if it is safe and effective for you

## 2019-12-10 NOTE — ASSESSMENT & PLAN NOTE
Tobacco Cessation Counseling: Tobacco cessation counseling and education was provided  The patient is sincerely urged to quit consumption of tobacco  She is ready to quit tobacco  The numerous health risks of tobacco consumption were discussed  Prescribed the following medications: varenicline (Chantix)  Discussed side effects of Chantix     Duration: Continue maintenance dose for 11 weeks (for a total of 12 weeks of treatment); if the patient successfully quits smoking at the end of 12 weeks, an additional 12-week course may increase likelihood of success  Approaches to selecting a tobacco quit date: May either choose a fixed quit date (ie, start varenicline, then quit on day 8) or a flexible quit date (ie, start varenicline, then quit between days 8 to 35)   Alternatively, a gradual quit date (ie, start varenicline and reduce smoking 50% by week 4, reduce an additional 50% by week 8, and continue reducing with a goal of complete abstinence by week 12) is acceptable

## 2019-12-10 NOTE — ASSESSMENT & PLAN NOTE
Resolving cough  Explained that sometimes it may take as many as 4 weeks for cough to go away after acute bronchitis  Patient did have PFTs done in October repeat    She has no obstructive defect, mild restrictive defect

## 2019-12-10 NOTE — ASSESSMENT & PLAN NOTE
Reports she is doing well on current sertraline 25 mg daily  The denies any suicidal or homicidal ideation

## 2019-12-10 NOTE — ASSESSMENT & PLAN NOTE
Within goal   Goal blood pressure for her is less than 140/90  Her blood pressure is 120/80 today  Advised lifestyle modification with diet and exercise  Continue hydrochlorothiazide 12 5 mg daily, refill sent    Will check urine for microalbumin

## 2019-12-10 NOTE — PROGRESS NOTES
Assessment/Plan     Acute bronchitis due to other specified organisms  Resolving cough  Explained that sometimes it may take as many as 4 weeks for cough to go away after acute bronchitis  Patient did have PFTs done in October repeat  She has no obstructive defect, mild restrictive defect    Essential hypertension  Within goal   Goal blood pressure for her is less than 140/90  Her blood pressure is 120/80 today  Advised lifestyle modification with diet and exercise  Continue hydrochlorothiazide 12 5 mg daily, refill sent  Will check urine for microalbumin    Depression  Reports she is doing well on current sertraline 25 mg daily  The denies any suicidal or homicidal ideation  Smoking  Tobacco Cessation Counseling: Tobacco cessation counseling and education was provided  The patient is sincerely urged to quit consumption of tobacco  She is ready to quit tobacco  The numerous health risks of tobacco consumption were discussed  Prescribed the following medications: varenicline (Chantix)  Discussed side effects of Chantix     Duration: Continue maintenance dose for 11 weeks (for a total of 12 weeks of treatment); if the patient successfully quits smoking at the end of 12 weeks, an additional 12-week course may increase likelihood of success  Approaches to selecting a tobacco quit date: May either choose a fixed quit date (ie, start varenicline, then quit on day 8) or a flexible quit date (ie, start varenicline, then quit between days 8 to 35)  Alternatively, a gradual quit date (ie, start varenicline and reduce smoking 50% by week 4, reduce an additional 50% by week 8, and continue reducing with a goal of complete abstinence by week 12) is acceptable       Diagnoses and all orders for this visit:    Smoking  -     varenicline (CHANTIX RAMESH) 0 5 MG X 11 & 1 MG X 42 tablet;  Take one 0 5mg tab by mouth 1x daily for 3 days, then increase to one 0 5mg tab 2x daily for 3 days, then increase to one 1mg tab 2x daily    Essential hypertension  -     Microalbumin / creatinine urine ratio  -     hydrochlorothiazide (HYDRODIURIL) 12 5 mg tablet; Take 1 tablet (12 5 mg total) by mouth daily    Acute bronchitis due to other specified organisms    Depression, unspecified depression type         Subjective     Chief Complaint   Patient presents with    Depression        40-year-old patient presented to office for follow-up visit  She was diagnosed with acute right bronchitis last month, she still has some lingering cough, but she reports that it is much better  Patient reports that she is continuing to smokes about half a pack a day, and would like to quit  She has tried Chantix in the past, and wants to try Chantix again  She is aware of the side effects  She is taking sertraline for depression, and is doing well on it  She denies any suicidal or homicidal ideation  She has no other acute concerns today  The following portions of the patient's history were reviewed and updated as appropriate: allergies, current medications, past family history, past medical history, past social history, past surgical history and problem list     Review of Systems   Constitutional: Negative for chills and fever  HENT: Negative for congestion  Respiratory: Negative for shortness of breath  Cardiovascular: Negative for chest pain  Gastrointestinal: Negative for abdominal pain, diarrhea, nausea and vomiting  Genitourinary: Negative for difficulty urinating  Neurological: Negative for headaches  Psychiatric/Behavioral: Negative for behavioral problems  Objective     Vitals:Blood pressure 120/80, pulse 78, temperature 98 1 °F (36 7 °C), resp  rate 16, height 5' 3" (1 6 m), weight 87 3 kg (192 lb 6 4 oz), last menstrual period 01/01/2016, not currently breastfeeding  Physical Exam:  Physical Exam   Constitutional: She is oriented to person, place, and time  She appears well-developed and well-nourished     HENT: Head: Normocephalic and atraumatic  Mouth/Throat: Oropharynx is clear and moist    Scarring of tympanic membrane bilaterally, no erythema   Eyes: Conjunctivae and EOM are normal    Neck: Normal range of motion  Neck supple  Cardiovascular: Normal rate, regular rhythm and normal heart sounds  Exam reveals no friction rub  No murmur heard  Pulmonary/Chest: Effort normal and breath sounds normal  No respiratory distress  She has no wheezes  She has no rales  Abdominal: Soft  Bowel sounds are normal  She exhibits no distension  There is no tenderness  Musculoskeletal: Normal range of motion  Neurological: She is alert and oriented to person, place, and time  Skin: Skin is warm and dry  Vitals reviewed

## 2019-12-30 ENCOUNTER — HOSPITAL ENCOUNTER (EMERGENCY)
Facility: HOSPITAL | Age: 49
Discharge: HOME | End: 2019-12-30
Attending: EMERGENCY MEDICINE
Payer: COMMERCIAL

## 2019-12-30 VITALS
DIASTOLIC BLOOD PRESSURE: 81 MMHG | SYSTOLIC BLOOD PRESSURE: 140 MMHG | HEIGHT: 63 IN | HEART RATE: 82 BPM | WEIGHT: 190 LBS | TEMPERATURE: 97.9 F | BODY MASS INDEX: 33.66 KG/M2 | RESPIRATION RATE: 20 BRPM | OXYGEN SATURATION: 97 %

## 2019-12-30 DIAGNOSIS — M54.9 BACK PAIN: Primary | ICD-10-CM

## 2019-12-30 DIAGNOSIS — B37.9 YEAST INFECTION: ICD-10-CM

## 2019-12-30 LAB
BACTERIA UR QL AUTO: ABNORMAL /HPF
BILIRUB UR QL STRIP: NEGATIVE
CLARITY UR: CLEAR
COLOR UR: YELLOW
COLOR, POC: YELLOW
EXT PREG TEST URINE: NEGATIVE
EXT. CONTROL ED NAV: NORMAL
GLUCOSE UR STRIP-MCNC: NEGATIVE MG/DL
HGB UR QL STRIP.AUTO: NEGATIVE
HYALINE CASTS #/AREA URNS LPF: ABNORMAL /LPF
KETONES UR STRIP-MCNC: NEGATIVE MG/DL
LEUKOCYTE ESTERASE UR QL STRIP: ABNORMAL
NITRITE UR QL STRIP: NEGATIVE
NON-SQ EPI CELLS URNS QL MICRO: ABNORMAL /HPF
PH UR STRIP.AUTO: 5.5 [PH] (ref 4.5–8)
PROT UR STRIP-MCNC: NEGATIVE MG/DL
RBC #/AREA URNS AUTO: ABNORMAL /HPF
SP GR UR STRIP.AUTO: 1.02 (ref 1–1.03)
UROBILINOGEN UR QL STRIP.AUTO: 0.2 E.U./DL
WBC #/AREA URNS AUTO: ABNORMAL /HPF

## 2019-12-30 PROCEDURE — 99283 EMERGENCY DEPT VISIT LOW MDM: CPT

## 2019-12-30 PROCEDURE — 99284 EMERGENCY DEPT VISIT MOD MDM: CPT | Performed by: EMERGENCY MEDICINE

## 2019-12-30 PROCEDURE — 81001 URINALYSIS AUTO W/SCOPE: CPT

## 2019-12-30 PROCEDURE — 81025 URINE PREGNANCY TEST: CPT | Performed by: EMERGENCY MEDICINE

## 2019-12-30 RX ORDER — FLUCONAZOLE 200 MG/1
200 TABLET ORAL ONCE
Status: COMPLETED | OUTPATIENT
Start: 2019-12-30 | End: 2019-12-30

## 2019-12-30 RX ORDER — IBUPROFEN 600 MG/1
600 TABLET ORAL ONCE
Status: COMPLETED | OUTPATIENT
Start: 2019-12-30 | End: 2019-12-30

## 2019-12-30 RX ADMIN — IBUPROFEN 600 MG: 600 TABLET ORAL at 19:01

## 2019-12-30 RX ADMIN — FLUCONAZOLE 200 MG: 200 TABLET ORAL at 19:02

## 2019-12-30 NOTE — ED PROVIDER NOTES
History  Chief Complaint   Patient presents with    Back Pain     Presents with lower back "spasm" pain x3 days  States she took a prednisone yesterday and it took the edge off        68-year-old female with chronic back pain presents to the emergency department for evaluation of a rash over her vaginal area that has been present for approximately 3 days  Patient states that she has been having itchiness in her vaginal area with dysuria  Patient also states that she had a back spasm 3 days ago and took prednisone which improved her symptoms  Patient currently has her baseline back pain which is unchanged from her baseline  Patient denies fever, chills, nausea, vomiting, saddle anesthesia, urinary retention, weakness or numbness in lower extremities, changes to her back pain, vaginal discharge, vaginal bleeding, history of STI, vaginal ulcers, diarrhea, rectal pain  Patient denies any red flags regarding the back pain  Patient denies night pain/weight loss, fever, chills, sweats, bony tenderness, morning stiffness greater than 30 minutes, urinary/bowel retention, saddle anesthesia, recent spinal instrumentation,  immunosuppression, AC/AP use, lower extremity weakness /numbness            Prior to Admission Medications   Prescriptions Last Dose Informant Patient Reported? Taking? albuterol (PROAIR HFA) 90 mcg/act inhaler   No No   Sig: Use 2 puffs every 4 hours as needed for wheezing or shortness of breath     atorvastatin (LIPITOR) 40 mg tablet   No No   Sig: TAKE 1 TABLET BY MOUTH EVERY DAY   hydrochlorothiazide (HYDRODIURIL) 12 5 mg tablet   No No   Sig: Take 1 tablet (12 5 mg total) by mouth daily   sertraline (ZOLOFT) 25 mg tablet   No No   Sig: Take 1 tablet (25 mg total) by mouth daily   varenicline (CHANTIX RAMESH) 0 5 MG X 11 & 1 MG X 42 tablet   No No   Sig: Take one 0 5mg tab by mouth 1x daily for 3 days, then increase to one 0 5mg tab 2x daily for 3 days, then increase to one 1mg tab 2x daily Facility-Administered Medications: None       Past Medical History:   Diagnosis Date    Depression 11/12/2019    Discharge from right nipple     last assessed 03/20/2013    Hyperlipidemia     Hypertension     Loose stools 1/30/2018    Subclinical hypothyroidism 4/26/2019       Past Surgical History:   Procedure Laterality Date    CARPAL TUNNEL RELEASE      [place] HYSTEROSCOPY,W/ENDO BX N/A 11/20/2018    Procedure: DILATATION AND CURETTAGE (D&C) WITH HYSTEROSCOPY;  Surgeon: Liz Matthews DO;  Location: [facility];  Service: Gynecology    TUBAL LIGATION      WRIST SURGERY Bilateral        Family History   Problem Relation Age of Onset    Heart attack Mother     Diabetes Father     Hypertension Father     Hyperlipidemia Father     Breast cancer Maternal Grandmother 61     I have reviewed and agree with the history as documented  Social History     Tobacco Use    Smoking status: Former Smoker    Smokeless tobacco: Former User    Tobacco comment: 2pk day, quit 2/2018   Substance Use Topics    Alcohol use: No    Drug use: No        Review of Systems   Constitutional: Negative for chills, diaphoresis, fatigue and fever  HENT: Negative for congestion, ear discharge, facial swelling, hearing loss, rhinorrhea, sinus pressure, sinus pain, sneezing, sore throat, tinnitus and trouble swallowing  Eyes: Negative for pain, discharge and redness  Respiratory: Negative for cough, choking, chest tightness, shortness of breath, wheezing and stridor  Cardiovascular: Negative for chest pain, palpitations and leg swelling  Gastrointestinal: Negative for abdominal distention, abdominal pain, blood in stool, constipation, diarrhea, nausea and vomiting  Endocrine: Negative for cold intolerance, polydipsia and polyuria  Genitourinary: Positive for dysuria and vaginal pain   Negative for decreased urine volume, difficulty urinating, dyspareunia, enuresis, flank pain, frequency, genital sores, hematuria, menstrual problem, pelvic pain, urgency, vaginal bleeding and vaginal discharge  Musculoskeletal: Positive for back pain  Negative for arthralgias, gait problem and neck stiffness  Skin: Negative for rash and wound  Neurological: Negative for dizziness, seizures, syncope, weakness, numbness and headaches  Hematological: Negative for adenopathy  Psychiatric/Behavioral: Negative for agitation, confusion, hallucinations, sleep disturbance and suicidal ideas  All other systems reviewed and are negative  Physical Exam  ED Triage Vitals [12/30/19 1619]   Temperature Pulse Resp Blood Pressure SpO2   97 9 °F (36 6 °C) 87 -- 146/86 97 %      Temp Source Heart Rate Source Patient Position - Orthostatic VS BP Location FiO2 (%)   Oral Monitor -- Left arm --      Pain Score       6             Orthostatic Vital Signs  Vitals:    12/30/19 1619   BP: 146/86   Pulse: 87       Physical Exam   Constitutional: She is oriented to person, place, and time  She appears well-developed and well-nourished  No distress  HENT:   Head: Normocephalic and atraumatic  Right Ear: External ear normal    Left Ear: External ear normal    Nose: No sinus tenderness  No epistaxis  Mouth/Throat: No oropharyngeal exudate  Eyes: Pupils are equal, round, and reactive to light  Conjunctivae and EOM are normal  Right eye exhibits no discharge  Left eye exhibits no discharge  Neck: No JVD present  Cardiovascular: Normal rate, regular rhythm, normal heart sounds and intact distal pulses  Exam reveals no gallop and no friction rub  No murmur heard  Pulmonary/Chest: Effort normal and breath sounds normal  No stridor  No respiratory distress  She has no wheezes  She has no rales  Abdominal: Soft  Bowel sounds are normal  She exhibits no distension and no mass  There is no tenderness  There is no rebound and no guarding  Genitourinary:         Musculoskeletal: Normal range of motion  She exhibits no edema, tenderness or deformity  Lymphadenopathy:     She has no cervical adenopathy  Neurological: She is alert and oriented to person, place, and time  She has normal strength  No cranial nerve deficit or sensory deficit  GCS eye subscore is 4  GCS verbal subscore is 5  GCS motor subscore is 6  Reflex Scores:       Patellar reflexes are 2+ on the right side and 2+ on the left side  UE and LE 5/5 strength, No focal neuro deficits  Skin: Skin is warm, dry and intact  Capillary refill takes less than 2 seconds  She is not diaphoretic  Psychiatric: She has a normal mood and affect  Her speech is normal and behavior is normal  Judgment and thought content normal    Nursing note and vitals reviewed        ED Medications  Medications   ibuprofen (MOTRIN) tablet 600 mg (600 mg Oral Given 12/30/19 1901)   fluconazole (DIFLUCAN) tablet 200 mg (200 mg Oral Given 12/30/19 1902)       Diagnostic Studies  Results Reviewed     Procedure Component Value Units Date/Time    Urine Microscopic [[id number]]  (Abnormal) Collected:  12/30/19 1854    Lab Status:  Final result Specimen:  Urine, Clean Catch Updated:  12/30/19 1925     RBC, UA None Seen /hpf      WBC, UA 2-4 /hpf      Epithelial Cells None Seen /hpf      Bacteria, UA None Seen /hpf      Hyaline Casts, UA None Seen /lpf     POCT pregnancy, urine [[id number]]  (Normal) Resulted:  12/30/19 1854    Lab Status:  Final result Updated:  12/30/19 1854     EXT PREG TEST UR (Ref: Negative) negative     Control valid    POCT urinalysis dipstick [[id number]]  (Normal) Resulted:  12/30/19 1854    Lab Status:  Final result Specimen:  Urine Updated:  12/30/19 1854     Color, UA yellow    Urine Macroscopic, POC [[id number]]  (Abnormal) Collected:  12/30/19 1854    Lab Status:  Final result Specimen:  Urine Updated:  12/30/19 1853     Color, UA Yellow     Clarity, UA Clear     pH, UA 5 5     Leukocytes, UA Trace     Nitrite, UA Negative     Protein, UA Negative mg/dl      Glucose, UA Negative mg/dl      Ketones, UA Negative mg/dl      Urobilinogen, UA 0 2 E U /dl      Bilirubin, UA Negative     Blood, UA Negative     Specific Gravity, UA 1 020    Narrative:       CLINITEK RESULT                 No orders to display         Procedures  Procedures      ED Course                               MDM  Number of Diagnoses or Management Options  Back pain: new and requires workup  Yeast infection: new and requires workup  Diagnosis management comments: 1  Chronic back pain  -comprehensive spine referral  -patient is continue her current home regimen  -follow-up by PCP  -ED as needed    2  Yeast infection, vaginal  -Diflucan given 200 mg p o  Once         Disposition  Final diagnoses:   Back pain   Yeast infection     Time reflects when diagnosis was documented in both MDM as applicable and the Disposition within this note     Time User Action Codes Description Comment    12/30/2019  6:38 PM Armando Guillaume [M54 9] Back pain     12/30/2019  6:38 PM [address] [B37 9] Yeast infection       ED Disposition     ED Disposition Condition Date/Time Comment    Discharge Stable Mon Dec 30, 2019  6:38 PM Amina Sainz discharge to home/self care  Follow-up Information     Follow up With Specialties Details Why Contact Info Additional Information    [employer]  Schedule an appointment as soon as possible for a visit in 1 day Scheduled appointment with her primary care physician [phone]       [address] [place] [address] Emergency Department Emergency Medicine Go to  If symptoms worsen, As needed [address] [facility] ED, [address], [address] [phone]          Patient's Medications   Discharge Prescriptions    No medications on file         ED Provider  Attending physically available and evaluated Verónica Randle I managed the patient along with the ED Attending      Electronically Signed by         Stephanie Ni DO  12/30/19 7222

## 2019-12-30 NOTE — ED ATTENDING ATTESTATION
12/30/2019  I, Evonne Hill MD, saw and evaluated the patient  I have discussed the patient with the resident/non-physician practitioner and agree with the resident's/non-physician practitioner's findings, Plan of Care, and MDM as documented in the resident's/non-physician practitioner's note, except where noted  All available labs and Radiology studies were reviewed  I was present for key portions of any procedure(s) performed by the resident/non-physician practitioner and I was immediately available to provide assistance  At this point I agree with the current assessment done in the Emergency Department    I have conducted an independent evaluation of this patient a history and physical is as follows:   Pt has chronic back pain and normaly takes prednisone for flare and took some yesterday and feels better Pt co vaginal discharge and itching for about a week some dysuria no fevers or abd pain PE: alert nad heart reg lungs clear abd soft nontender  MDM: check urine pelvic look for discharge will treat  ED Course         Critical Care Time  Procedures

## 2020-01-14 ENCOUNTER — TELEPHONE (OUTPATIENT)
Dept: FAMILY MEDICINE CLINIC | Facility: CLINIC | Age: 50
End: 2020-01-14

## 2020-01-14 ENCOUNTER — OFFICE VISIT (OUTPATIENT)
Dept: FAMILY MEDICINE CLINIC | Facility: CLINIC | Age: 50
End: 2020-01-14

## 2020-01-14 VITALS
SYSTOLIC BLOOD PRESSURE: 142 MMHG | TEMPERATURE: 97.9 F | HEIGHT: 63 IN | WEIGHT: 196.4 LBS | BODY MASS INDEX: 34.8 KG/M2 | DIASTOLIC BLOOD PRESSURE: 82 MMHG | HEART RATE: 88 BPM | RESPIRATION RATE: 18 BRPM

## 2020-01-14 DIAGNOSIS — J30.9 ALLERGIC RHINITIS, UNSPECIFIED SEASONALITY, UNSPECIFIED TRIGGER: ICD-10-CM

## 2020-01-14 DIAGNOSIS — F17.200 SMOKING: ICD-10-CM

## 2020-01-14 DIAGNOSIS — M54.50 CHRONIC MIDLINE LOW BACK PAIN WITHOUT SCIATICA: Primary | ICD-10-CM

## 2020-01-14 DIAGNOSIS — G89.29 CHRONIC MIDLINE LOW BACK PAIN WITHOUT SCIATICA: Primary | ICD-10-CM

## 2020-01-14 DIAGNOSIS — I10 ESSENTIAL HYPERTENSION: ICD-10-CM

## 2020-01-14 DIAGNOSIS — F32.A DEPRESSION, UNSPECIFIED DEPRESSION TYPE: ICD-10-CM

## 2020-01-14 PROCEDURE — 3008F BODY MASS INDEX DOCD: CPT | Performed by: FAMILY MEDICINE

## 2020-01-14 PROCEDURE — 99213 OFFICE O/P EST LOW 20 MIN: CPT | Performed by: FAMILY MEDICINE

## 2020-01-14 RX ORDER — FLUTICASONE PROPIONATE 50 MCG
1 SPRAY, SUSPENSION (ML) NASAL DAILY
Qty: 1 BOTTLE | Refills: 1 | Status: SHIPPED | OUTPATIENT
Start: 2020-01-14 | End: 2020-02-18 | Stop reason: SDUPTHER

## 2020-01-14 RX ORDER — HYDROCHLOROTHIAZIDE 12.5 MG/1
25 TABLET ORAL DAILY
Qty: 90 TABLET | Refills: 3
Start: 2020-01-14 | End: 2020-01-27 | Stop reason: SDUPTHER

## 2020-01-14 RX ORDER — SERTRALINE HYDROCHLORIDE 25 MG/1
25 TABLET, FILM COATED ORAL DAILY
Qty: 30 TABLET | Refills: 1 | Status: SHIPPED | OUTPATIENT
Start: 2020-01-14 | End: 2020-01-15 | Stop reason: SDUPTHER

## 2020-01-14 RX ORDER — VARENICLINE TARTRATE 25 MG
KIT ORAL
Qty: 53 TABLET | Refills: 0 | Status: SHIPPED | OUTPATIENT
Start: 2020-01-14 | End: 2020-03-03

## 2020-01-14 NOTE — PROGRESS NOTES
Assessment/Plan:  BMI Counseling: Body mass index is 34 79 kg/m²  The BMI is above normal  Nutrition recommendations include reducing portion sizes, 3-5 servings of fruits/vegetables daily, reducing fast food intake and consuming healthier snacks  Allergic rhinitis  Advised to take Flonase  Also discussed option of Neti pot  Continue Allegra  Essential hypertension  Blood pressure 140/82 today, goal for her is less than 140/90  Patient reports that blood pressure at home was usually between 135 and 140 and 77-84 diastolic  Currently on hydrochlorothiazide 12 5 mg daily  Will increase hydrochlorothiazide 25 mg to daily and have her follow up in 1 month for blood pressure recheck  Recommended lifestyle modification with diet and exercise, dash diet    Chronic midline low back pain without sciatica  Advised to take Tylenol as needed  Referral for physical therapy given    Depression  Stable  Continue sertraline 25 mg daily  She denies any suicidal or homicidal ideation    Tobacco Cessation Counseling: Tobacco cessation counseling and education was provided  The patient is sincerely urged to quit consumption of tobacco  She is ready to quit tobacco  The numerous health risks of tobacco consumption were discussed  Prescribed the following medications: varenicline (Chantix)  Diagnoses and all orders for this visit:    Chronic midline low back pain without sciatica  -     Ambulatory referral to Physical Therapy; Future    Depression, unspecified depression type  -     Discontinue: sertraline (ZOLOFT) 25 mg tablet; Take 1 tablet (25 mg total) by mouth daily    Smoking  -     varenicline (CHANTIX RAMESH) 0 5 MG X 11 & 1 MG X 42 tablet; Take one 0 5mg tab by mouth 1x daily for 3 days, then increase to one 0 5mg tab 2x daily for 3 days, then increase to one 1mg tab 2x daily    Essential hypertension  -     hydrochlorothiazide (HYDRODIURIL) 12 5 mg tablet;  Take 2 tablets (25 mg total) by mouth daily    Allergic rhinitis, unspecified seasonality, unspecified trigger  -     fluticasone (FLONASE) 50 mcg/act nasal spray; 1 spray into each nostril daily          Subjective:      Patient ID: Kelly Wolff is a 52 y o  female  59-year-old female presented to office for follow-up visit  Patient reports that she was doing well Chantix family was not smoking cigarettes for 2 weeks, but got another pack of cigarettes today  Patient would like new prescription of Chantix today, and is determined this time to quit smoking  She was also recently seen in ER for chronic back pain  She reports that she has had this back pain for years, and was also given prednisone course last year  She is also concerned about allergies and postnasal drip  The following portions of the patient's history were reviewed and updated as appropriate: allergies, current medications, past family history, past medical history, past social history, past surgical history and problem list     Review of Systems   Constitutional: Negative for activity change, chills and fever  HENT: Positive for congestion and postnasal drip  Respiratory: Negative for shortness of breath and wheezing  Cardiovascular: Negative for chest pain and palpitations  Gastrointestinal: Negative for abdominal pain, diarrhea, nausea and vomiting  Genitourinary: Negative for difficulty urinating  Musculoskeletal: Positive for back pain  Neurological: Negative for dizziness and headaches  Objective:      /82 (BP Location: Left arm, Patient Position: Sitting, Cuff Size: Large)   Pulse 88   Temp 97 9 °F (36 6 °C) (Tympanic)   Resp 18   Ht 5' 3" (1 6 m)   Wt 89 1 kg (196 lb 6 4 oz)   LMP 01/01/2016   BMI 34 79 kg/m²          Physical Exam   Constitutional: She is oriented to person, place, and time  She appears well-developed and well-nourished  HENT:   Head: Normocephalic and atraumatic     Mouth/Throat: Oropharynx is clear and moist    Cobblestoning in posterior pharynx suggestive of postnasal drip   Eyes: Conjunctivae and EOM are normal    Neck: Normal range of motion  Neck supple  Cardiovascular: Normal rate, regular rhythm and normal heart sounds  Exam reveals no friction rub  No murmur heard  Pulmonary/Chest: Effort normal and breath sounds normal  No respiratory distress  She has no wheezes  She has no rales  Abdominal: Soft  Bowel sounds are normal  She exhibits no distension  There is no tenderness  Musculoskeletal: Normal range of motion  She exhibits no tenderness  Neurological: She is alert and oriented to person, place, and time  Skin: Skin is warm and dry  Vitals reviewed

## 2020-01-15 DIAGNOSIS — F32.A DEPRESSION, UNSPECIFIED DEPRESSION TYPE: ICD-10-CM

## 2020-01-15 RX ORDER — SERTRALINE HYDROCHLORIDE 25 MG/1
25 TABLET, FILM COATED ORAL DAILY
Qty: 30 TABLET | Refills: 1 | Status: SHIPPED | OUTPATIENT
Start: 2020-01-15 | End: 2020-03-16

## 2020-01-15 NOTE — ASSESSMENT & PLAN NOTE
Blood pressure 140/82 today, goal for her is less than 140/90  Patient reports that blood pressure at home was usually between 135 and 140 and 22-30 diastolic  Currently on hydrochlorothiazide 12 5 mg daily  Will increase hydrochlorothiazide 25 mg to daily and have her follow up in 1 month for blood pressure recheck    Recommended lifestyle modification with diet and exercise, dash diet

## 2020-01-15 NOTE — TELEPHONE ENCOUNTER
Zoloft prescription did not go thru yesterday electronically, entered new order for sign off  Thanks!

## 2020-01-27 ENCOUNTER — EVALUATION (OUTPATIENT)
Dept: PHYSICAL THERAPY | Facility: REHABILITATION | Age: 50
End: 2020-01-27
Payer: COMMERCIAL

## 2020-01-27 DIAGNOSIS — M54.50 CHRONIC MIDLINE LOW BACK PAIN WITHOUT SCIATICA: Primary | ICD-10-CM

## 2020-01-27 DIAGNOSIS — F17.200 SMOKING: ICD-10-CM

## 2020-01-27 DIAGNOSIS — G89.29 CHRONIC MIDLINE LOW BACK PAIN WITHOUT SCIATICA: Primary | ICD-10-CM

## 2020-01-27 DIAGNOSIS — I10 ESSENTIAL HYPERTENSION: ICD-10-CM

## 2020-01-27 PROCEDURE — 97112 NEUROMUSCULAR REEDUCATION: CPT | Performed by: PHYSICAL THERAPIST

## 2020-01-27 PROCEDURE — 97161 PT EVAL LOW COMPLEX 20 MIN: CPT | Performed by: PHYSICAL THERAPIST

## 2020-01-27 RX ORDER — HYDROCHLOROTHIAZIDE 25 MG/1
25 TABLET ORAL DAILY
Qty: 90 TABLET | Refills: 1 | Status: SHIPPED | OUTPATIENT
Start: 2020-01-27 | End: 2020-07-17

## 2020-01-27 RX ORDER — VARENICLINE TARTRATE 1 MG/1
1 TABLET, FILM COATED ORAL 2 TIMES DAILY
Qty: 60 TABLET | Refills: 2 | Status: SHIPPED | OUTPATIENT
Start: 2020-01-27 | End: 2020-03-17

## 2020-01-27 NOTE — PROGRESS NOTES
PT Discharge    Today's date: 2020  Patient name: Cecy Conroy  : 1970  MRN: [id number]  Referring provider: Graciela Abdalla MD  Dx:   Encounter Diagnosis     ICD-10-CM    1  Chronic midline low back pain without sciatica M54 5 Ambulatory referral to Physical Therapy    G89 29          Patient no showed her next appointment and after calling patient, she stated she has declined PT because she is feeling much better from her HEP  Assessment  Assessment details: Cecy Conroy is a pleasant 52 y o  female who presents with chronic low back pain  The patient's greatest concerns are fear of not being able to keep active and future ill health (and wanting to prevent it)  No further referral appears necessary at this time based upon examination results  Primary movement impairment diagnosis of lumbar spine movement coordination deficit, resulting in pathoanatomical symptoms of Chronic midline low back pain without sciatica, which is 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 as well as stay in one position for too long, especially standing  The 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 may have precipitated her current episode of low back pain secondary to poor body mechanics and chronic, repetitive microtrauma to the lumbar spine  She presented with a mild limitation of ROM into extension, which suggests she has more pain with facet closure, as well as right sidebending  She did also have some movement coordination deficits with evidence of poor squatting and lifting mechanics  The patient did have good strength, but had deficits of her hip extensors and abductors  She did not present with any radicular signs nor symptoms, as well as no red flags  She would benefit from skilled physical therapy to address her current deficits, improve her QOL, and restore her PLOF       Primary Impairments:  1) poor movement coordination   2) pain limiting function     Etiologic factors include none recalled by the patient  Impairments: abnormal coordination, abnormal muscle firing, abnormal muscle tone, abnormal or restricted ROM, abnormal movement, activity intolerance, difficulty understanding, impaired physical strength, lacks appropriate home exercise program, pain with function, poor posture  and poor body mechanics    Symptom irritability: moderateUnderstanding of Dx/Px/POC: good   Prognosis: good  Prognosis details: Positive prognostic indicators include positive attitude toward recovery  Negative prognostic indicators include chronicity of symptoms, sedentary lifestyle, and tobacco dependence  Goals  Impairment Based Goals: (6 weeks)   Patient will restore normal lumbar ROM   Patient will have decrease in pain score of 2 points or more   Patient will improve hip abductor and extensor strength by 1/2 grade or more   Patient will improve FOTO score greater than predicted increase     Functional based Goals: (8 weeks)   Patient will be independent with home exercise program    Patient will be able to manage symptoms independently  Patient 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 without limitations from low back pain   Patient will be able to walk for 1 hour without limitations from low back pain    Plan  Plan details: Prognosis above is given PT services 2x/week tapering to 1x/week over the next 2 months and home program adherence    Patient would benefit from: skilled physical therapy  Planned modality interventions: thermotherapy: hydrocollator packs  Planned therapy interventions: activity modification, joint mobilization, manual therapy, motor coordination training, neuromuscular re-education, patient education, self care, therapeutic activities, therapeutic exercise, graded activity, home exercise program, behavior modification, functional ROM exercises, flexibility, stretching, strengthening and abdominal trunk stabilization  Plan of Care beginning date: 1/27/2020  Plan of Care expiration date: 3/23/2020  Treatment plan discussed with: patient        Subjective Evaluation    History of Present Illness  Mechanism of injury: Patient reports that she has been experiencing low back pain for about the past 3 months that she is unable to tie to a specific incident  She states that if she is on her feet for too long, she will feel some aches in her back  She reports that if she has two pillows behind her back, that tends to help her symptoms when she is sitting  She states that usually when it is cold her symptoms are worse  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, 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  She states that she has had some back pain in the past, but not to this extent  She reports that it is more on the right side of her back versus the left side     Pain  Current pain ratin  At best pain ratin  At worst pain rating: 10  Quality: tight, dull ache and discomfort  Relieving factors: rest, relaxation, support and medications  Aggravating factors: sitting, walking, standing, stair climbing and lifting    Treatments  Previous treatment: medication  Current treatment: medication and physical therapy  Patient Goals  Patient goals for therapy: decreased pain, increased motion, increased strength, independence with ADLs/IADLs and return to sport/leisure activities  Patient goal: patient would like to be able to stand, work as a caregiver, and walk without pain in her low back          Objective     General Comments:      Lumbar Comments  Functional Limitations:  standing for long periods of time, walking for prolonged periods, sitting for prolonged periods of time, working extended hours as a caregiver and doing household chores      Occupation: Caregiver     Gait: Normal     Standing Posture: Normal     Red Flags:   No night pain, no previous history of cancer, no unexplained weight loss, negative Babinski and Samano pathological reflexes bilaterally, no signs of systemic infection (fever, chills)     Manual Muscle Testing:   Hip Flexion: R  4+/5       L  4+/5   Hip Extension: R  3+/5    L  3+/5   Hip Abduction: R  4/5  L  4/5   Hip Adduction: R  /5  L  /5   Knee Extension: R  5/5                         L 5/5   Knee Flexion:  R  5/5    L  5/5      Palpation Assessment: No tenderness to palpation      Range of Motion:   Lumbar Spine Flexion: WNL   Lumbar Spine Extension: 25% limited with pain  Lumbar Spine Sidebending: R    25% limited with pain        L WNL      Lifting Mechanics: Rounding of lumbar and thoracic spine when lifting object from floor  Failure to squat with legs and maintain a neutral spine during lifting activity     Squat:  Forward trunk lean, anterior displacement of body, lack of posterior weight shift, lack of neutral spine       Flowsheet Rows      Most Recent Value   PT/OT G-Codes   Current Score  67   Projected Score  75             Precautions: N/A      Manual                                                                                   Exercise Diary  1/27            TrA Activation  10x3"            Sharon Pose  10x3"            Cat/Camel 10x            TrA with march              Birddogs             Clamshells             Prone hip ext knee bent             Lumbar Rollouts             Treadmill                                                                                                                                                                Modalities

## 2020-02-10 ENCOUNTER — OFFICE VISIT (OUTPATIENT)
Dept: FAMILY MEDICINE CLINIC | Facility: CLINIC | Age: 50
End: 2020-02-10

## 2020-02-10 VITALS
RESPIRATION RATE: 16 BRPM | DIASTOLIC BLOOD PRESSURE: 88 MMHG | BODY MASS INDEX: 34.59 KG/M2 | TEMPERATURE: 98.8 F | WEIGHT: 195.2 LBS | SYSTOLIC BLOOD PRESSURE: 140 MMHG | HEART RATE: 82 BPM | HEIGHT: 63 IN

## 2020-02-10 DIAGNOSIS — J20.8 ACUTE BRONCHITIS DUE TO OTHER SPECIFIED ORGANISMS: Primary | ICD-10-CM

## 2020-02-10 PROCEDURE — 99213 OFFICE O/P EST LOW 20 MIN: CPT | Performed by: FAMILY MEDICINE

## 2020-02-10 PROCEDURE — 3077F SYST BP >= 140 MM HG: CPT | Performed by: FAMILY MEDICINE

## 2020-02-10 PROCEDURE — 1036F TOBACCO NON-USER: CPT | Performed by: FAMILY MEDICINE

## 2020-02-10 PROCEDURE — 3008F BODY MASS INDEX DOCD: CPT | Performed by: FAMILY MEDICINE

## 2020-02-10 PROCEDURE — 3079F DIAST BP 80-89 MM HG: CPT | Performed by: FAMILY MEDICINE

## 2020-02-10 RX ORDER — AZITHROMYCIN 250 MG/1
TABLET, FILM COATED ORAL
Qty: 6 TABLET | Refills: 0 | Status: SHIPPED | OUTPATIENT
Start: 2020-02-10 | End: 2020-02-15

## 2020-02-10 NOTE — LETTER
February 10, 2020     Patient: Miguelina Vanegas   YOB: 1970   Date of Visit: 2/10/2020       To Whom it May Concern:    Miguelina Vanegas is under my professional care  She was seen in my office on 2/10/2020  She may return to work on 02/12/20  If you have any questions or concerns, please don't hesitate to call           Sincerely,          Severiano Kilgore MD        CC: No Recipients

## 2020-02-10 NOTE — PROGRESS NOTES
Assessment/Plan:     Acute bronchitis due to other specified organisms  Patient complains of rhinorrhea, cough, and vomiting  Symptoms first began 7 days ago and seem to be worsening  Due to her hx of bronchitis and worsening symptoms, prescribed Z pack  Discussed with patient to begin taking Z pack after 2 days only if her symptoms do not begin improving  Encourage increase in p o hydration  Work note provided , may go back to work on Wednesday 02/12/20       Diagnoses and all orders for this visit:    Acute bronchitis due to other specified organisms  -     azithromycin (ZITHROMAX) 250 mg tablet; Take 2 tablets (500 mg total) by mouth daily for 1 day, THEN 1 tablet (250 mg total) daily for 4 days  Subjective:      Patient ID: Terry Hernandez is a 52 y o  female  Patient is a 51 y/o female with a PMHx of bronchitis presents today with a cough  She states she has had a runny nose for 7 days, and then 4 days ago she began having a really bad productive cough  She has been coughing up yellow/green mucous  She states her chest hurts from coughing  Yesterday she began vomiting the contents of her stomach  She has been unable to keep any food down but is keeping liquids down  She said that she believes it has been getting worse  She has also been having chills and feeling feverish  Patient also reports left ear pain, however there is no discharge  No recent travel  She is UTD on her immunizations including the annual influenza vaccine  Her grandson was sick last week with URI  She quit smoking one month ago and is using Chantix  The following portions of the patient's history were reviewed and updated as appropriate: allergies, current medications, past family history, past medical history, past social history, past surgical history and problem list     Review of Systems   Constitutional: Positive for chills  Negative for fever  HENT: Positive for ear pain and rhinorrhea   Negative for sinus pain     Eyes: Negative for pain and redness  Respiratory: Positive for cough  Negative for choking  Cardiovascular: Negative for chest pain  Gastrointestinal: Positive for vomiting  Negative for abdominal pain, constipation and diarrhea  Allergic/Immunologic: Negative for food allergies  Bee sting allergy  Objective:      /88   Pulse 82   Temp 98 8 °F (37 1 °C)   Resp 16   Ht 5' 3" (1 6 m)   Wt 88 5 kg (195 lb 3 2 oz)   LMP 01/01/2016   BMI 34 58 kg/m²          Physical Exam   Constitutional: She is oriented to person, place, and time  She appears well-developed and well-nourished  No distress  HENT:   Head: Normocephalic and atraumatic  Right Ear: External ear normal    Left Ear: External ear normal    Nose: Nose normal    Mouth/Throat: Oropharynx is clear and moist  No oropharyngeal exudate  Eyes: Pupils are equal, round, and reactive to light  Conjunctivae are normal    Neck: Normal range of motion  Neck supple  Cardiovascular: Normal rate, regular rhythm and normal heart sounds  Pulmonary/Chest: Effort normal and breath sounds normal  No respiratory distress  Abdominal: Soft  Bowel sounds are normal    Lymphadenopathy:     She has no cervical adenopathy  Neurological: She is alert and oriented to person, place, and time  Skin: Skin is warm and dry  Psychiatric: She has a normal mood and affect   Her behavior is normal  Judgment and thought content normal

## 2020-02-10 NOTE — ASSESSMENT & PLAN NOTE
Patient complains of rhinorrhea, cough, and vomiting  Symptoms first began 7 days ago and seem to be worsening  Due to her hx of bronchitis and worsening symptoms, prescribed Z pack  Discussed with patient to begin taking Z pack after 2 days only if her symptoms do not begin improving     Encourage increase in p o hydration  Work note provided , may go back to work on Wednesday 02/12/20

## 2020-02-10 NOTE — PATIENT INSTRUCTIONS
Acute Bronchitis   WHAT YOU NEED TO KNOW:   Acute bronchitis is swelling and irritation in the air passages of your lungs  This irritation may cause you to cough or have other breathing problems  Acute bronchitis often starts because of another illness, such as a cold or the flu  The illness spreads from your nose and throat to your windpipe and airways  Bronchitis is often called a chest cold  Acute bronchitis lasts about 3 to 6 weeks and is usually not a serious illness  Your cough can last for several weeks  DISCHARGE INSTRUCTIONS:   Return to the emergency department if:   · You cough up blood  · Your lips or fingernails turn blue  · You feel like you are not getting enough air when you breathe  Contact your healthcare provider if:   · You have a fever  · Your breathing problems do not go away or get worse  · Your cough does not get better within 4 weeks  · You have questions or concerns about your condition or care  Self-care:   · Get more rest   Rest helps your body to heal  Slowly start to do more each day  Rest when you feel it is needed  · Avoid irritants in the air  Avoid chemicals, fumes, and dust  Wear a face mask if you must work around dust or fumes  Stay inside on days when air pollution levels are high  If you have allergies, stay inside when pollen counts are high  Do not use aerosol products, such as spray-on deodorant, bug spray, and hair spray  · Do not smoke or be around others who smoke  Nicotine and other chemicals in cigarettes and cigars damages the cilia that move mucus out of your lungs  Ask your healthcare provider for information if you currently smoke and need help to quit  E-cigarettes or smokeless tobacco still contain nicotine  Talk to your healthcare provider before you use these products  · Drink liquids as directed  Liquids help keep your air passages moist and help you cough up mucus   You may need to drink more liquids when you have acute bronchitis  Ask how much liquid to drink each day and which liquids are best for you  · Use a humidifier or vaporizer  Use a cool mist humidifier or a vaporizer to increase air moisture in your home  This may make it easier for you to breathe and help decrease your cough  Decrease risk for acute bronchitis:   · Get the vaccinations you need  Ask your healthcare provider if you should get vaccinated against the flu or pneumonia  · Prevent the spread of germs  You can decrease your risk of acute bronchitis and other illnesses by doing the following:     [facility] your hands often with soap and water  Carry germ-killing hand lotion or gel with you  You can use the lotion or gel to clean your hands when soap and water are not available  ¨ Do not touch your eyes, nose, or mouth unless you have washed your hands first     ¨ Always cover your mouth when you cough to prevent the spread of germs  It is best to cough into a tissue or your shirt sleeve instead of into your hand  Ask those around you cover their mouths when they cough  ¨ Try to avoid people who have a cold or the flu  If you are sick, stay away from others as much as possible  Medicines: Your healthcare provider may  give you any of the following:  · Ibuprofen or acetaminophen  are medicines that help lower your fever  They are available without a doctor's order  Ask your healthcare provider which medicine is right for you  Ask how much to take and how often to take it  Follow directions  These medicines can cause stomach bleeding if not taken correctly  Ibuprofen can cause kidney damage  Do not take ibuprofen if you have kidney disease, an ulcer, or allergies to aspirin  Acetaminophen can cause liver damage  Do not take more than 4,000 milligrams in 24 hours  · Decongestants  help loosen mucus in your lungs and make it easier to cough up  This can help you breathe easier  · Cough suppressants  decrease your urge to cough   If your cough produces mucus, do not take a cough suppressant unless your healthcare provider tells you to  Your healthcare provider may suggest that you take a cough suppressant at night so you can rest     · Inhalers  may be given  Your healthcare provider may give you one or more inhalers to help you breathe easier and cough less  An inhaler gives your medicine to open your airways  Ask your healthcare provider to show you how to use your inhaler correctly  · Take your medicine as directed  Contact your healthcare provider if you think your medicine is not helping or if you have side effects  Tell him of her if you are allergic to any medicine  Keep a list of the medicines, vitamins, and herbs you take  Include the amounts, and when and why you take them  Bring the list or the pill bottles to follow-up visits  Carry your medicine list with you in case of an emergency  Follow up with your healthcare provider as directed:  Write down questions you have so you will remember to ask them during your follow-up visits  © 2017 [employer] Information is for End User's use only and may not be sold, redistributed or otherwise used for commercial purposes  All illustrations and images included in CareNotes® are the copyrighted property of [employer]  or [employer]  The above information is an  only  It is not intended as medical advice for individual conditions or treatments  Talk to your doctor, nurse or pharmacist before following any medical regimen to see if it is safe and effective for you

## 2020-02-18 ENCOUNTER — NURSE TRIAGE (OUTPATIENT)
Dept: OTHER | Facility: OTHER | Age: 50
End: 2020-02-18

## 2020-02-18 ENCOUNTER — TELEPHONE (OUTPATIENT)
Dept: FAMILY MEDICINE CLINIC | Facility: CLINIC | Age: 50
End: 2020-02-18

## 2020-02-18 DIAGNOSIS — J30.9 ALLERGIC RHINITIS, UNSPECIFIED SEASONALITY, UNSPECIFIED TRIGGER: ICD-10-CM

## 2020-02-18 RX ORDER — FLUTICASONE PROPIONATE 50 MCG
1 SPRAY, SUSPENSION (ML) NASAL DAILY
Qty: 1 BOTTLE | Refills: 1 | Status: SHIPPED | OUTPATIENT
Start: 2020-02-18 | End: 2020-11-26 | Stop reason: SDUPTHER

## 2020-02-19 NOTE — TELEPHONE ENCOUNTER
"I took an extra dose of one of my BP medications " Connect with Poison Control  Informed her a Triage nurse would do a follow up call

## 2020-02-19 NOTE — TELEPHONE ENCOUNTER
Reason for Disposition   DOUBLE DOSE (an extra dose or lesser amount) of prescription drug    Answer Assessment - Initial Assessment Questions  1  SUBSTANCE: The patient took an extra dose of her Hydrochlorthiazide 25 mg  3  ONSET: The medication was taken @ 2100  The patient normally takes the medication once a day in the am  She inadvertently took this second dose tonight  4  SYMPTOMS: She is having no side effects or ill symptoms @ this time  5  SUICIDAL: No    The patient was connected with Poison Control per protocol first  She was instructed to call back if she becomes dizzy or has any other problems  She was also told by the Poison Control representative to not take her next dose for @ least 24 hours  Since the patient normally takes the medication in the am this is a time concern  The on call provider from [place] was contacted  Dr Roscoe Barr was given report and she instructed this Triage nurse to have the patient hold her am dose tomorrow and restart it on Thursday 2-20  The patient was instructed as noted and verbalized understanding      Protocols used: POISONING-ADULT-

## 2020-02-19 NOTE — TELEPHONE ENCOUNTER
Received a phone call stating that patient took a second dose of her HCTZ tonight on accident  Her current regimen is HCTZ 25mg daily for HTN  She was advised to call poison control who advised her on precautionary symptoms and to not take another dose until 24 hrs after 2nd dose was taken  I advised the nurse that patient can skip tomorrow, as she normally takes her HCTZ in the morning, and resume her normal regimen on Thursday morning

## 2020-03-03 ENCOUNTER — OFFICE VISIT (OUTPATIENT)
Dept: FAMILY MEDICINE CLINIC | Facility: CLINIC | Age: 50
End: 2020-03-03

## 2020-03-03 VITALS
RESPIRATION RATE: 18 BRPM | WEIGHT: 198.8 LBS | DIASTOLIC BLOOD PRESSURE: 80 MMHG | TEMPERATURE: 97.5 F | BODY MASS INDEX: 35.22 KG/M2 | HEIGHT: 63 IN | SYSTOLIC BLOOD PRESSURE: 136 MMHG | HEART RATE: 82 BPM

## 2020-03-03 DIAGNOSIS — K21.9 GASTROESOPHAGEAL REFLUX DISEASE, ESOPHAGITIS PRESENCE NOT SPECIFIED: ICD-10-CM

## 2020-03-03 DIAGNOSIS — F41.1 GENERALIZED ANXIETY DISORDER: ICD-10-CM

## 2020-03-03 DIAGNOSIS — Z12.11 SCREEN FOR COLON CANCER: ICD-10-CM

## 2020-03-03 DIAGNOSIS — Z00.00 ANNUAL PHYSICAL EXAM: Primary | ICD-10-CM

## 2020-03-03 PROBLEM — H60.502 ACUTE OTITIS EXTERNA OF LEFT EAR: Status: RESOLVED | Noted: 2019-01-09 | Resolved: 2020-03-03

## 2020-03-03 PROBLEM — R03.0 ELEVATED BLOOD PRESSURE READING: Status: RESOLVED | Noted: 2019-10-18 | Resolved: 2020-03-03

## 2020-03-03 PROBLEM — J06.9 VIRAL URI WITH COUGH: Status: RESOLVED | Noted: 2018-04-19 | Resolved: 2020-03-03

## 2020-03-03 PROBLEM — J20.8 ACUTE BRONCHITIS DUE TO OTHER SPECIFIED ORGANISMS: Status: RESOLVED | Noted: 2019-10-23 | Resolved: 2020-03-03

## 2020-03-03 PROBLEM — H60.91 OTITIS EXTERNA OF RIGHT EAR: Status: RESOLVED | Noted: 2018-11-16 | Resolved: 2020-03-03

## 2020-03-03 PROCEDURE — 3008F BODY MASS INDEX DOCD: CPT | Performed by: OBSTETRICS & GYNECOLOGY

## 2020-03-03 PROCEDURE — 99396 PREV VISIT EST AGE 40-64: CPT | Performed by: FAMILY MEDICINE

## 2020-03-03 PROCEDURE — 3008F BODY MASS INDEX DOCD: CPT | Performed by: FAMILY MEDICINE

## 2020-03-03 RX ORDER — HYDROCHLOROTHIAZIDE 12.5 MG/1
TABLET ORAL
COMMUNITY
Start: 2020-02-17 | End: 2020-03-03

## 2020-03-03 RX ORDER — PANTOPRAZOLE SODIUM 40 MG/1
40 TABLET, DELAYED RELEASE ORAL DAILY
Qty: 60 TABLET | Refills: 0 | Status: SHIPPED | OUTPATIENT
Start: 2020-03-03 | End: 2020-05-02

## 2020-03-03 NOTE — ASSESSMENT & PLAN NOTE
Patient tolerating Zoloft 25mg qd  Patient not currently experiencing depressive episodes but does attribute to increased anxiety due to family stress  Will continue current medical regimen given patient improvement in depressed mood  Recommended patient to f/u with behavioral therapy to better manage her anxiety

## 2020-03-03 NOTE — PATIENT INSTRUCTIONS
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
## 2020-03-03 NOTE — PROGRESS NOTES
[facility]    NAME: aKren Wilson  AGE: 48 y o  SEX: female  : 1970     DATE: 3/3/2020     Assessment and Plan:     Problem List Items Addressed This Visit        Digestive    GERD (gastroesophageal reflux disease)     Counseled patient on avoiding fatty, fried foods and maintaining portion control  Counseled patient on avoiding trigger foods, in this case dairy  Recommended trial of pantoprazole 40mg qd 30 minutes before breakfast for 8 weeks  Patient will f/u in one month regarding anxiety and reflux issues  Relevant Medications    pantoprazole (PROTONIX) 40 mg tablet       Other    Anxiety disorder     Patient tolerating Zoloft 25mg qd  Patient not currently experiencing depressive episodes but does attribute to increased anxiety due to family stress  Will continue current medical regimen given patient improvement in depressed mood  Recommended patient to f/u with behavioral therapy to better manage her anxiety           Other Visit Diagnoses     Annual physical exam    -  Primary    Screen for colon cancer        Relevant Orders    Ambulatory referral to Gastroenterology    Ambulatory referral to [address] [place] [address] for HIV (human immunodeficiency virus)              Immunizations and preventive care screenings were discussed with patient today  Appropriate education was printed on patient's after visit summary  Counseling:  Alcohol/drug use: discussed moderation in alcohol intake, the recommendations for healthy alcohol use, and avoidance of illicit drug use  Dental Health: discussed importance of regular tooth brushing, flossing, and dental visits  Injury prevention: discussed safety/seat belts, safety helmets, smoke detectors, carbon dioxide detectors, and smoking near bedding or upholstery    Sexual health: discussed sexually transmitted diseases, partner selection, use of condoms, avoidance of unintended pregnancy, and contraceptive alternatives  · Exercise: the importance of regular exercise/physical activity was discussed  Recommend exercise 3-5 times per week for at least 30 minutes  Return in about 1 month (around 4/3/2020) for NATHONY  Chief Complaint:     Chief Complaint   Patient presents with    Annual Exam      History of Present Illness:     Adult Annual Physical   Patient here for a comprehensive physical exam  The patient reports problems - patient reports stress  Diet and Physical Activity  · Diet/Nutrition: poor diet and limited fruits/vegetables  We discussed maintaining a balanced diet and avoiding junk food and snacks  Counseled patient on eating 3 meals/day and eating 5 servings of fruits and vegetables per day  · Exercise: no formal exercise  Discussed with patient starting a walking regimen of 30 minutes per day after work, can be with   Depression Screening  PHQ-9 Depression Screening    PHQ-9:    Frequency of the following problems over the past two weeks:       Little interest or pleasure in doing things:  0 - not at all  Feeling down, depressed, or hopeless:  0 - not at all  Trouble falling or staying asleep, or sleeping too much:  1 - several days  Feeling tired or having little energy:  1 - several days  Poor appetite or overeatin - not at all  Feeling bad about yourself - or that you are a failure or have let yourself or your family down:  0 - not at all  Trouble concentrating on things, such as reading the newspaper or watching television:  0 - not at all  Moving or speaking so slowly that other people could have noticed   Or the opposite - being so fidgety or restless that you have been moving around a lot more than usual:  0 - not at all  Thoughts that you would be better off dead, or of hurting yourself in some way:  0 - not at all  PHQ-2 Score:  0  PHQ-9 Score:  2       General Health  · Sleep: gets 7-8 hours of sleep on average and snores loudly  · Hearing: normal - bilateral   · Vision: no vision problems  · Dental: regular dental visits, brushes teeth twice daily and does not floss  /GYN Health  · Patient is: postmenopausal  · Last menstrual period: 52     Review of Systems:     Review of Systems   Constitutional: Negative for activity change, appetite change, chills, diaphoresis, fatigue, fever and unexpected weight change  HENT: Negative for congestion, dental problem, mouth sores, sinus pressure, sinus pain, sore throat and trouble swallowing  Eyes: Negative for photophobia, pain, discharge, redness and itching  Respiratory: Negative for cough, chest tightness and shortness of breath  Cardiovascular: Negative for chest pain, palpitations and leg swelling  Gastrointestinal: Positive for constipation and diarrhea  Negative for abdominal distention, abdominal pain, blood in stool, nausea and vomiting  Genitourinary: Negative for difficulty urinating, dysuria, hematuria, vaginal bleeding and vaginal discharge  Musculoskeletal: Positive for back pain  Negative for arthralgias and myalgias  Skin: Negative for color change, pallor and rash  Allergic/Immunologic: Negative for environmental allergies and food allergies  Allergic to bee stings   Neurological: Negative for dizziness, light-headedness, numbness and headaches  Psychiatric/Behavioral: Negative for agitation and behavioral problems        Past Medical History:     Past Medical History:   Diagnosis Date    Depression 11/12/2019    Discharge from right nipple     last assessed 03/20/2013    GERD (gastroesophageal reflux disease) 3/3/2020    Hyperlipidemia     Hypertension     Loose stools 1/30/2018    Subclinical hypothyroidism 4/26/2019      Past Surgical History:     Past Surgical History:   Procedure Laterality Date    CARPAL TUNNEL RELEASE      HI HYSTEROSCOPY,W/ENDO BX N/A 11/20/2018    Procedure: DILATATION AND CURETTAGE (D&C) WITH HYSTEROSCOPY;  Surgeon: Elda Weiss DO;  [facility];  Service: Gynecology    TUBAL LIGATION      WRIST SURGERY Bilateral       Social History:        Social History     Socioeconomic History    Marital status: /Civil Union     Spouse name: None    Number of children: None    Years of education: None    Highest education level: None   Occupational History    None   Social Needs    Financial resource strain: Not hard at all   [employer] insecurity:     Worry: Sometimes true     Inability: Never true   [employer] needs:     Medical: None     Non-medical: None   Tobacco Use    Smoking status: Former Smoker    Smokeless tobacco: Former User    Tobacco comment: 2pk day, quit 2/2018   Substance and Sexual Activity    Alcohol use: No    Drug use: No    Sexual activity: Yes     Partners: Male   Lifestyle    Physical activity:     Days per week: None     Minutes per session: None    Stress: None   Relationships    Social connections:     Talks on phone: None     Gets together: None     Attends Yazidi service: None     Active member of club or organization: None     Attends meetings of clubs or organizations: None     Relationship status: None    Intimate partner violence:     Fear of current or ex partner: None     Emotionally abused: None     Physically abused: None     Forced sexual activity: None   Other Topics Concern    None   Social History Narrative    Current everyday smoker per [employer]      Family History:     Family History   Problem Relation Age of Onset    Heart attack Mother     Diabetes Father     Hypertension Father     Hyperlipidemia Father     Breast cancer Maternal Grandmother 61      Current Medications:     Current Outpatient Medications   Medication Sig Dispense Refill    albuterol (PROAIR HFA) 90 mcg/act inhaler Use 2 puffs every 4 hours as needed for wheezing or shortness of breath   8 5 Inhaler 0    atorvastatin (LIPITOR) 40 mg tablet TAKE 1 TABLET BY MOUTH EVERY DAY 30 tablet 5    fluticasone (FLONASE) 50 mcg/act nasal spray 1 spray into each nostril daily 1 Bottle 1    hydrochlorothiazide (HYDRODIURIL) 25 mg tablet Take 1 tablet (25 mg total) by mouth daily 90 tablet 1    sertraline (ZOLOFT) 25 mg tablet Take 1 tablet (25 mg total) by mouth daily 30 tablet 1    varenicline (CHANTIX) 1 mg tablet Take 1 tablet (1 mg total) by mouth 2 (two) times a day 60 tablet 2    pantoprazole (PROTONIX) 40 mg tablet Take 1 tablet (40 mg total) by mouth daily 60 tablet 0     No current facility-administered medications for this visit  Allergies: Allergies   Allergen Reactions    Bee Pollen      Other reaction(s): Allergic Rhinitis, Sneezing    Pollen Extract Allergic Rhinitis and Sneezing      Physical Exam:     /80 (BP Location: Left arm, Patient Position: Sitting, Cuff Size: Large)   Pulse 82   Temp 97 5 °F (36 4 °C) (Tympanic)   Resp 18   Ht 5' 3" (1 6 m)   Wt 90 2 kg (198 lb 12 8 oz)   LMP 01/01/2016   BMI 35 22 kg/m²     Physical Exam   Constitutional: She is oriented to person, place, and time  She appears well-developed and well-nourished  HENT:   Head: Normocephalic and atraumatic  Left Ear: External ear normal    Mouth/Throat: Oropharynx is clear and moist  No oropharyngeal exudate  Right ear canal partly occluded with wax   Eyes: Pupils are equal, round, and reactive to light  EOM are normal  Right eye exhibits no discharge  Left eye exhibits no discharge  No scleral icterus  Neck: Normal range of motion  Cardiovascular: Normal rate, regular rhythm, normal heart sounds and intact distal pulses  Exam reveals no gallop and no friction rub  No murmur heard  Pulmonary/Chest: Effort normal and breath sounds normal  No stridor  No respiratory distress  She has no wheezes  She has no rales  She exhibits no tenderness  Abdominal: Soft  Bowel sounds are normal  She exhibits no distension  There is no tenderness  Musculoskeletal: Normal range of motion  She exhibits no edema, tenderness or deformity  Neurological: She is alert and oriented to person, place, and time  Skin: Skin is warm and dry  Capillary refill takes less than 2 seconds  She is not diaphoretic  Psychiatric: She has a normal mood and affect   Her behavior is normal        [address]

## 2020-03-03 NOTE — ASSESSMENT & PLAN NOTE
Counseled patient on avoiding fatty, fried foods and maintaining portion control  Counseled patient on avoiding trigger foods, in this case dairy  Recommended trial of pantoprazole 40mg qd 30 minutes before breakfast for 8 weeks  Patient will f/u in one month regarding anxiety and reflux issues

## 2020-03-16 DIAGNOSIS — F32.A DEPRESSION, UNSPECIFIED DEPRESSION TYPE: ICD-10-CM

## 2020-03-16 RX ORDER — SERTRALINE HYDROCHLORIDE 25 MG/1
TABLET, FILM COATED ORAL
Qty: 30 TABLET | Refills: 1 | Status: SHIPPED | OUTPATIENT
Start: 2020-03-16 | End: 2020-04-12 | Stop reason: SDUPTHER

## 2020-03-17 DIAGNOSIS — F17.200 SMOKING: ICD-10-CM

## 2020-03-17 RX ORDER — VARENICLINE TARTRATE 1 MG/1
TABLET, FILM COATED ORAL
Qty: 56 TABLET | Refills: 2 | Status: SHIPPED | OUTPATIENT
Start: 2020-03-17 | End: 2020-09-10 | Stop reason: ALTCHOICE

## 2020-04-02 DIAGNOSIS — E78.5 DYSLIPIDEMIA: ICD-10-CM

## 2020-04-02 RX ORDER — ATORVASTATIN CALCIUM 40 MG/1
TABLET, FILM COATED ORAL
Qty: 60 TABLET | Refills: 2 | Status: SHIPPED | OUTPATIENT
Start: 2020-04-02 | End: 2020-09-26 | Stop reason: SDUPTHER

## 2020-04-06 ENCOUNTER — TELEMEDICINE (OUTPATIENT)
Dept: FAMILY MEDICINE CLINIC | Facility: CLINIC | Age: 50
End: 2020-04-06

## 2020-04-06 VITALS
DIASTOLIC BLOOD PRESSURE: 96 MMHG | SYSTOLIC BLOOD PRESSURE: 136 MMHG | TEMPERATURE: 97.9 F | BODY MASS INDEX: 35.11 KG/M2 | WEIGHT: 198.2 LBS

## 2020-04-06 DIAGNOSIS — N95.0 POSTMENOPAUSAL BLEEDING: Primary | ICD-10-CM

## 2020-04-06 PROCEDURE — 99213 OFFICE O/P EST LOW 20 MIN: CPT | Performed by: FAMILY MEDICINE

## 2020-04-09 ENCOUNTER — TELEMEDICINE (OUTPATIENT)
Dept: OBGYN CLINIC | Facility: CLINIC | Age: 50
End: 2020-04-09
Payer: COMMERCIAL

## 2020-04-09 VITALS
BODY MASS INDEX: 34.97 KG/M2 | SYSTOLIC BLOOD PRESSURE: 151 MMHG | DIASTOLIC BLOOD PRESSURE: 105 MMHG | WEIGHT: 197.4 LBS

## 2020-04-09 DIAGNOSIS — N84.0 ENDOMETRIAL POLYP: Primary | ICD-10-CM

## 2020-04-09 DIAGNOSIS — N95.0 POSTMENOPAUSAL BLEEDING: ICD-10-CM

## 2020-04-09 DIAGNOSIS — E66.09 CLASS 1 OBESITY DUE TO EXCESS CALORIES WITHOUT SERIOUS COMORBIDITY WITH BODY MASS INDEX (BMI) OF 34.0 TO 34.9 IN ADULT: ICD-10-CM

## 2020-04-09 PROBLEM — E66.811 CLASS 1 OBESITY DUE TO EXCESS CALORIES WITHOUT SERIOUS COMORBIDITY WITH BODY MASS INDEX (BMI) OF 34.0 TO 34.9 IN ADULT: Status: ACTIVE | Noted: 2018-08-06

## 2020-04-09 PROCEDURE — 99213 OFFICE O/P EST LOW 20 MIN: CPT | Performed by: OBSTETRICS & GYNECOLOGY

## 2020-04-12 DIAGNOSIS — F32.A DEPRESSION, UNSPECIFIED DEPRESSION TYPE: ICD-10-CM

## 2020-04-12 RX ORDER — SERTRALINE HYDROCHLORIDE 25 MG/1
TABLET, FILM COATED ORAL
Qty: 30 TABLET | Refills: 1 | Status: SHIPPED | OUTPATIENT
Start: 2020-04-12 | End: 2020-06-04

## 2020-04-14 ENCOUNTER — HOSPITAL ENCOUNTER (OUTPATIENT)
Dept: RADIOLOGY | Facility: HOSPITAL | Age: 50
Discharge: HOME | End: 2020-04-14
Attending: OBSTETRICS & GYNECOLOGY
Payer: COMMERCIAL

## 2020-04-14 ENCOUNTER — TRANSCRIBE ORDERS (OUTPATIENT)
Dept: RADIOLOGY | Facility: HOSPITAL | Age: 50
End: 2020-04-14

## 2020-04-14 DIAGNOSIS — N84.0 ENDOMETRIAL POLYP: ICD-10-CM

## 2020-04-14 DIAGNOSIS — N95.0 POSTMENOPAUSAL BLEEDING: ICD-10-CM

## 2020-04-14 PROCEDURE — 76856 US EXAM PELVIC COMPLETE: CPT

## 2020-04-14 PROCEDURE — 76830 TRANSVAGINAL US NON-OB: CPT

## 2020-04-25 DIAGNOSIS — K21.9 GASTROESOPHAGEAL REFLUX DISEASE, ESOPHAGITIS PRESENCE NOT SPECIFIED: ICD-10-CM

## 2020-04-27 RX ORDER — PANTOPRAZOLE SODIUM 40 MG/1
TABLET, DELAYED RELEASE ORAL
Qty: 60 TABLET | Refills: 0 | OUTPATIENT
Start: 2020-04-27

## 2020-04-30 ENCOUNTER — PROCEDURE VISIT (OUTPATIENT)
Dept: OBGYN CLINIC | Facility: CLINIC | Age: 50
End: 2020-04-30
Payer: COMMERCIAL

## 2020-04-30 VITALS
BODY MASS INDEX: 35.85 KG/M2 | WEIGHT: 202.4 LBS | SYSTOLIC BLOOD PRESSURE: 128 MMHG | DIASTOLIC BLOOD PRESSURE: 80 MMHG | TEMPERATURE: 97.9 F

## 2020-04-30 DIAGNOSIS — N95.0 POSTMENOPAUSAL BLEEDING: Primary | ICD-10-CM

## 2020-04-30 PROCEDURE — 88305 TISSUE EXAM BY PATHOLOGIST: CPT | Performed by: PATHOLOGY

## 2020-04-30 PROCEDURE — 58100 BIOPSY OF UTERUS LINING: CPT | Performed by: OBSTETRICS & GYNECOLOGY

## 2020-05-14 ENCOUNTER — TELEMEDICINE (OUTPATIENT)
Dept: OBGYN CLINIC | Facility: CLINIC | Age: 50
End: 2020-05-14
Payer: COMMERCIAL

## 2020-05-14 VITALS
WEIGHT: 198.8 LBS | BODY MASS INDEX: 35.22 KG/M2 | DIASTOLIC BLOOD PRESSURE: 97 MMHG | SYSTOLIC BLOOD PRESSURE: 154 MMHG | TEMPERATURE: 98.6 F

## 2020-05-14 DIAGNOSIS — N95.0 POSTMENOPAUSAL BLEEDING: Primary | ICD-10-CM

## 2020-05-14 PROCEDURE — 99213 OFFICE O/P EST LOW 20 MIN: CPT | Performed by: OBSTETRICS & GYNECOLOGY

## 2020-06-04 DIAGNOSIS — F32.A DEPRESSION, UNSPECIFIED DEPRESSION TYPE: ICD-10-CM

## 2020-06-04 RX ORDER — SERTRALINE HYDROCHLORIDE 25 MG/1
TABLET, FILM COATED ORAL
Qty: 30 TABLET | Refills: 1 | Status: SHIPPED | OUTPATIENT
Start: 2020-06-04 | End: 2020-08-02

## 2020-07-03 ENCOUNTER — OFFICE VISIT (OUTPATIENT)
Dept: URGENT CARE | Age: 50
End: 2020-07-03
Payer: COMMERCIAL

## 2020-07-03 VITALS
SYSTOLIC BLOOD PRESSURE: 148 MMHG | BODY MASS INDEX: 37.17 KG/M2 | DIASTOLIC BLOOD PRESSURE: 84 MMHG | HEIGHT: 62 IN | TEMPERATURE: 98.9 F | HEART RATE: 80 BPM | OXYGEN SATURATION: 96 % | WEIGHT: 202 LBS | RESPIRATION RATE: 18 BRPM

## 2020-07-03 DIAGNOSIS — J30.2 SEASONAL ALLERGIES: ICD-10-CM

## 2020-07-03 DIAGNOSIS — H69.82 EUSTACHIAN TUBE DYSFUNCTION, LEFT: Primary | ICD-10-CM

## 2020-07-03 PROBLEM — H69.92 EUSTACHIAN TUBE DYSFUNCTION, LEFT: Status: ACTIVE | Noted: 2020-07-03

## 2020-07-03 PROCEDURE — 99213 OFFICE O/P EST LOW 20 MIN: CPT | Performed by: PHYSICIAN ASSISTANT

## 2020-07-03 NOTE — PROGRESS NOTES
[email or web address]        NAME: Klaus Orantes is a 48 y o  female  : 1970    MRN: [id number]  DATE: July 3, 2020  TIME: 6:03 PM    Assessment and Plan   Eustachian tube dysfunction, left [H69 82]  1  Eustachian tube dysfunction, left     2  Seasonal allergies           Patient Instructions       Follow up with PCP in 3-5 days  Proceed to  ER if symptoms worsen  Chief Complaint     Chief Complaint   Patient presents with   Julia Less     left ear x3 days         History of Present Illness       Patient here for evaluation of pain in her left ear  Patient states it feels like pressure and fullness  She does have history of allergies  She is not sure if it is infected  Review of Systems   Review of Systems   Constitutional: Negative  HENT: Positive for ear pain, postnasal drip and rhinorrhea  Negative for congestion, ear discharge, sinus pressure, sinus pain, sore throat and trouble swallowing  Eyes: Negative  Respiratory: Negative  Cardiovascular: Negative            Current Medications       Current Outpatient Medications:     albuterol (PROAIR HFA) 90 mcg/act inhaler, Use 2 puffs every 4 hours as needed for wheezing or shortness of breath , Disp: 8 5 Inhaler, Rfl: 0    atorvastatin (LIPITOR) 40 mg tablet, TAKE 1 TABLET BY MOUTH EVERY DAY, Disp: 60 tablet, Rfl: 2    fluticasone (FLONASE) 50 mcg/act nasal spray, 1 spray into each nostril daily, Disp: 1 Bottle, Rfl: 1    hydrochlorothiazide (HYDRODIURIL) 25 mg tablet, Take 1 tablet (25 mg total) by mouth daily, Disp: 90 tablet, Rfl: 1    sertraline (ZOLOFT) 25 mg tablet, TAKE 1 TABLET BY MOUTH EVERY DAY, Disp: 30 tablet, Rfl: 1    CHANTIX CONTINUING MONTH RAMESH 1 MG tablet, TAKE 1 TABLET BY MOUTH TWICE A DAY (Patient not taking: Reported on 7/3/2020), Disp: 56 tablet, Rfl: 2    pantoprazole (PROTONIX) 40 mg tablet, Take 1 tablet (40 mg total) by mouth daily, Disp: 60 tablet, Rfl: 0    Current Allergies     Allergies as of 07/03/2020 - Reviewed 07/03/2020   Allergen Reaction Noted    Bee pollen  12/13/2012    Pollen extract Allergic Rhinitis and Sneezing 12/13/2012            The following portions of the patient's history were reviewed and updated as appropriate: allergies, current medications, past family history, past medical history, past social history, past surgical history and problem list      Past Medical History:   Diagnosis Date    Depression 11/12/2019    Discharge from right nipple     last assessed 03/20/2013    GERD (gastroesophageal reflux disease) 3/3/2020    Hyperlipidemia     Hypertension     Loose stools 1/30/2018    Subclinical hypothyroidism 4/26/2019       Past Surgical History:   Procedure Laterality Date    CARPAL TUNNEL RELEASE      AR HYSTEROSCOPY,W/ENDO BX N/A 11/20/2018    Procedure: DILATATION AND CURETTAGE (D&C) WITH HYSTEROSCOPY;  Surgeon: Erickson Nova DO;  [facility];  Service: Gynecology    TUBAL LIGATION      WRIST SURGERY Bilateral        Family History   Problem Relation Age of Onset    Heart attack Mother     Diabetes Father     Hypertension Father     Hyperlipidemia Father     Breast cancer Maternal Grandmother 61    Asthma Sister     Fibromyalgia Sister     No Known Problems Brother     Depression Daughter     No Known Problems Maternal Grandfather     No Known Problems Sister     No Known Problems Sister     No Known Problems Brother     Mental retardation Brother     Cerebral palsy Brother     ADD / ADHD Daughter     Bipolar disorder Daughter          Medications have been verified  Objective   /84   Pulse 80   Temp 98 9 °F (37 2 °C)   Resp 18   Ht 5' 2" (1 575 m)   Wt 91 6 kg (202 lb)   LMP 01/01/2016   SpO2 96%   BMI 36 95 kg/m²        Physical Exam     Physical Exam   Constitutional: She is oriented to person, place, and time  She appears well-developed and well-nourished  No distress     HENT:   Head: Normocephalic and atraumatic  Right Ear: External ear normal    Left Ear: External ear normal    Nose: Nose normal    Mouth/Throat: Oropharynx is clear and moist  No oropharyngeal exudate  Left TM intact with clear fluid in the middle ear  No erythema  No bulging or retraction of the TM  Eyes: Pupils are equal, round, and reactive to light  Conjunctivae and EOM are normal    Lymphadenopathy:     She has no cervical adenopathy  Neurological: She is alert and oriented to person, place, and time  Skin: Skin is warm and dry  No rash noted  She is not diaphoretic  Psychiatric: She has a normal mood and affect  Her behavior is normal  Judgment and thought content normal    Nursing note and vitals reviewed

## 2020-07-03 NOTE — PATIENT INSTRUCTIONS
Take Claritin D as directed    Use Flonase as directed    Follow-up with the primary care physician as needed

## 2020-07-14 DIAGNOSIS — I10 ESSENTIAL HYPERTENSION: ICD-10-CM

## 2020-07-16 ENCOUNTER — TELEPHONE (OUTPATIENT)
Dept: FAMILY MEDICINE CLINIC | Facility: CLINIC | Age: 50
End: 2020-07-16

## 2020-07-16 NOTE — TELEPHONE ENCOUNTER
Patient requesting refill of HCTZ  Last visit for BP was March, please call to schedule with Dr Aylin Brown in September  We can then order enough RX until visit  Thanks!

## 2020-07-17 RX ORDER — HYDROCHLOROTHIAZIDE 25 MG/1
TABLET ORAL
Qty: 60 TABLET | Refills: 2 | Status: SHIPPED | OUTPATIENT
Start: 2020-07-17 | End: 2020-11-09

## 2020-08-01 DIAGNOSIS — F32.A DEPRESSION, UNSPECIFIED DEPRESSION TYPE: ICD-10-CM

## 2020-08-02 RX ORDER — SERTRALINE HYDROCHLORIDE 25 MG/1
TABLET, FILM COATED ORAL
Qty: 30 TABLET | Refills: 1 | Status: SHIPPED | OUTPATIENT
Start: 2020-08-02 | End: 2020-08-25

## 2020-08-18 ENCOUNTER — OFFICE VISIT (OUTPATIENT)
Dept: URGENT CARE | Age: 50
End: 2020-08-18
Payer: COMMERCIAL

## 2020-08-18 VITALS
TEMPERATURE: 97.9 F | WEIGHT: 205 LBS | RESPIRATION RATE: 16 BRPM | SYSTOLIC BLOOD PRESSURE: 139 MMHG | OXYGEN SATURATION: 96 % | BODY MASS INDEX: 36.32 KG/M2 | DIASTOLIC BLOOD PRESSURE: 82 MMHG | HEIGHT: 63 IN | HEART RATE: 81 BPM

## 2020-08-18 DIAGNOSIS — H69.83 DYSFUNCTION OF BOTH EUSTACHIAN TUBES: ICD-10-CM

## 2020-08-18 DIAGNOSIS — H66.93 BILATERAL OTITIS MEDIA, UNSPECIFIED OTITIS MEDIA TYPE: Primary | ICD-10-CM

## 2020-08-18 PROCEDURE — 99213 OFFICE O/P EST LOW 20 MIN: CPT | Performed by: FAMILY MEDICINE

## 2020-08-18 RX ORDER — AMOXICILLIN 500 MG/1
500 CAPSULE ORAL EVERY 8 HOURS SCHEDULED
Qty: 30 CAPSULE | Refills: 0 | Status: SHIPPED | OUTPATIENT
Start: 2020-08-18 | End: 2020-08-28

## 2020-08-18 NOTE — LETTER
August 18, 2020     Patient: Noni Jones   YOB: 1970   Date of Visit: 8/18/2020       To Whom It May Concern: It is my medical opinion that Noni Jones may return to work on 08/20/2020  If you have any questions or concerns, please don't hesitate to call           Sincerely,        Boogie Nunez DO    CC: No Recipients

## 2020-08-18 NOTE — PROGRESS NOTES
[facility]        NAME: Marysol Ching is a 48 y o  female  : 1970    MRN: [id number]  DATE: 2020  TIME: 6:42 PM    Assessment and Plan   Bilateral otitis media, unspecified otitis media type [H66 93]  1  Bilateral otitis media, unspecified otitis media type  amoxicillin (AMOXIL) 500 mg capsule   2  Dysfunction of both eustachian tubes           Patient Instructions     Patient Instructions   Options discussed with patient  Amoxicillin 3 times a day until finished (please take probiotics)  Continue Allegra 180 mg once daily  Flonase nasal spray 1 spray in each nostril once or twice a day  Tylenol as needed  Recheck/follow-up with family physician as discussed  Please go to the hospital emergency department if needed  Follow up with PCP in 3-5 days  Proceed to  ER if symptoms worsen  Chief Complaint     Chief Complaint   Patient presents with    Earache     BL X 3 WEEKS    WAS SEEN AT [facility] WEEK  Jazmine Cruel WAX REMOVAL AND EAR DROPS    NOT RELIEVED    LEFT SIDE IS WORSE         History of Present Illness       Bilateral ear pain (left ear worse than the right ear); patient was seen at [facility] and given ear drops without significant improvement; patient states that she has an appointment with her family physician next month (patient states the current visits with her family physician are virtual")      Review of Systems   Review of Systems   HENT: Positive for ear pain  All other systems reviewed and are negative          Current Medications       Current Outpatient Medications:     albuterol (PROAIR HFA) 90 mcg/act inhaler, Use 2 puffs every 4 hours as needed for wheezing or shortness of breath , Disp: 8 5 Inhaler, Rfl: 0    atorvastatin (LIPITOR) 40 mg tablet, TAKE 1 TABLET BY MOUTH EVERY DAY, Disp: 60 tablet, Rfl: 2    fluticasone (FLONASE) 50 mcg/act nasal spray, 1 spray into each nostril daily, Disp: 1 Bottle, Rfl: 1    hydrochlorothiazide (HYDRODIURIL) 25 mg tablet, TAKE 1 TABLET BY MOUTH EVERY DAY, Disp: 60 tablet, Rfl: 2    sertraline (ZOLOFT) 25 mg tablet, TAKE 1 TABLET BY MOUTH EVERY DAY, Disp: 30 tablet, Rfl: 1    amoxicillin (AMOXIL) 500 mg capsule, Take 1 capsule (500 mg total) by mouth every 8 (eight) hours for 30 doses, Disp: 30 capsule, Rfl: 0    CHANTIX CONTINUING MONTH RAMESH 1 MG tablet, TAKE 1 TABLET BY MOUTH TWICE A DAY (Patient not taking: Reported on 7/3/2020), Disp: 56 tablet, Rfl: 2    pantoprazole (PROTONIX) 40 mg tablet, Take 1 tablet (40 mg total) by mouth daily, Disp: 60 tablet, Rfl: 0    Current Allergies     Allergies as of 08/18/2020 - Reviewed 08/18/2020   Allergen Reaction Noted    Bee pollen  12/13/2012    Pollen extract Allergic Rhinitis and Sneezing 12/13/2012            The following portions of the patient's history were reviewed and updated as appropriate: allergies, current medications, past family history, past medical history, past social history, past surgical history and problem list      Past Medical History:   Diagnosis Date    Depression 11/12/2019    Discharge from right nipple     last assessed 03/20/2013    GERD (gastroesophageal reflux disease) 3/3/2020    Hyperlipidemia     Hypertension     Loose stools 1/30/2018    Subclinical hypothyroidism 4/26/2019       Past Surgical History:   Procedure Laterality Date    CARPAL TUNNEL RELEASE      PA HYSTEROSCOPY,W/ENDO BX N/A 11/20/2018    Procedure: DILATATION AND CURETTAGE (D&C) WITH HYSTEROSCOPY;  Surgeon: Trixie Fonseca DO;  [facility];  Service: Gynecology    TUBAL LIGATION      WRIST SURGERY Bilateral        Family History   Problem Relation Age of Onset    Heart attack Mother     Diabetes Father     Hypertension Father     Hyperlipidemia Father     Breast cancer Maternal Grandmother 61    Asthma Sister     Fibromyalgia Sister     No Known Problems Brother     Depression Daughter     No Known Problems Maternal Grandfather  No Known Problems Sister     No Known Problems Sister     No Known Problems Brother     Mental retardation Brother     Cerebral palsy Brother     ADD / ADHD Daughter     Bipolar disorder Daughter          Medications have been verified  Objective   /82   Pulse 81   Temp 97 9 °F (36 6 °C)   Resp 16   Ht 5' 3" (1 6 m)   Wt 93 kg (205 lb)   LMP 01/01/2016   SpO2 96%   BMI 36 31 kg/m²        Physical Exam     Physical Exam  Vitals signs and nursing note reviewed  Constitutional:       Appearance: Normal appearance  HENT:      Head:      Comments: Slight retraction and erythema of both eardrums  Neck:      Musculoskeletal: Normal range of motion and neck supple  Cardiovascular:      Rate and Rhythm: Normal rate and regular rhythm  Pulmonary:      Effort: Pulmonary effort is normal       Breath sounds: Normal breath sounds  Skin:     Comments: Good color and turgor   Neurological:      Mental Status: She is alert and oriented to person, place, and time        Comments: No nuchal rigidity   Psychiatric:         Mood and Affect: Mood normal          Behavior: Behavior normal

## 2020-08-18 NOTE — PATIENT INSTRUCTIONS
Options discussed with patient  Amoxicillin 3 times a day until finished (please take probiotics)  Continue Allegra 180 mg once daily  Flonase nasal spray 1 spray in each nostril once or twice a day  Tylenol as needed  Recheck/follow-up with family physician as discussed  Please go to the hospital emergency department if needed

## 2020-08-25 DIAGNOSIS — F32.A DEPRESSION, UNSPECIFIED DEPRESSION TYPE: ICD-10-CM

## 2020-08-25 RX ORDER — SERTRALINE HYDROCHLORIDE 25 MG/1
TABLET, FILM COATED ORAL
Qty: 30 TABLET | Refills: 1 | Status: SHIPPED | OUTPATIENT
Start: 2020-08-25 | End: 2020-09-10 | Stop reason: ALTCHOICE

## 2020-09-10 ENCOUNTER — OFFICE VISIT (OUTPATIENT)
Dept: FAMILY MEDICINE CLINIC | Facility: CLINIC | Age: 50
End: 2020-09-10

## 2020-09-10 VITALS
RESPIRATION RATE: 16 BRPM | BODY MASS INDEX: 36.36 KG/M2 | DIASTOLIC BLOOD PRESSURE: 72 MMHG | WEIGHT: 205.2 LBS | TEMPERATURE: 97.8 F | SYSTOLIC BLOOD PRESSURE: 120 MMHG | HEART RATE: 82 BPM | HEIGHT: 63 IN

## 2020-09-10 DIAGNOSIS — R53.83 FATIGUE, UNSPECIFIED TYPE: ICD-10-CM

## 2020-09-10 DIAGNOSIS — F41.1 GENERALIZED ANXIETY DISORDER: ICD-10-CM

## 2020-09-10 DIAGNOSIS — F17.200 TOBACCO DEPENDENCE: ICD-10-CM

## 2020-09-10 DIAGNOSIS — E66.09 CLASS 1 OBESITY DUE TO EXCESS CALORIES WITHOUT SERIOUS COMORBIDITY WITH BODY MASS INDEX (BMI) OF 34.0 TO 34.9 IN ADULT: ICD-10-CM

## 2020-09-10 DIAGNOSIS — I10 ESSENTIAL HYPERTENSION: Primary | ICD-10-CM

## 2020-09-10 DIAGNOSIS — J30.2 SEASONAL ALLERGIES: ICD-10-CM

## 2020-09-10 DIAGNOSIS — J45.21 MILD INTERMITTENT ASTHMATIC BRONCHITIS WITH ACUTE EXACERBATION: ICD-10-CM

## 2020-09-10 DIAGNOSIS — E03.8 SUBCLINICAL HYPOTHYROIDISM: ICD-10-CM

## 2020-09-10 PROCEDURE — 3008F BODY MASS INDEX DOCD: CPT | Performed by: FAMILY MEDICINE

## 2020-09-10 PROCEDURE — 1036F TOBACCO NON-USER: CPT | Performed by: FAMILY MEDICINE

## 2020-09-10 PROCEDURE — 99214 OFFICE O/P EST MOD 30 MIN: CPT | Performed by: FAMILY MEDICINE

## 2020-09-10 RX ORDER — ESCITALOPRAM OXALATE 10 MG/1
10 TABLET ORAL DAILY
Qty: 30 TABLET | Refills: 1 | Status: SHIPPED | OUTPATIENT
Start: 2020-09-10 | End: 2020-10-05

## 2020-09-10 RX ORDER — ALBUTEROL SULFATE 90 UG/1
AEROSOL, METERED RESPIRATORY (INHALATION)
Qty: 8.5 INHALER | Refills: 0 | Status: SHIPPED | OUTPATIENT
Start: 2020-09-10 | End: 2020-10-03 | Stop reason: SDUPTHER

## 2020-09-10 NOTE — ASSESSMENT & PLAN NOTE
Blood pressure 120/72, within goal   Continue current regimen of hydrochlorothiazide 25 milligrams daily    Will check CMP, lipid panel

## 2020-09-10 NOTE — PROGRESS NOTES
Assessment/Plan     Subclinical hypothyroidism  Will check TSH    Essential hypertension  Blood pressure 120/72, within goal   Continue current regimen of hydrochlorothiazide 25 milligrams daily  Will check CMP, lipid panel    Anxiety disorder  Patient reports Zoloft is not helping, will switch to Lexapro and follow-up in 1 month  Class 1 obesity due to excess calories without serious comorbidity with body mass index (BMI) of 34 0 to 34 9 in adult  BMI Counseling: Body mass index is 36 35 kg/m²  The BMI is above normal  Nutrition recommendations include reducing portion sizes, decreasing overall calorie intake and 3-5 servings of fruits/vegetables daily  Exercise recommendations include moderate aerobic physical activity for 150 minutes/week and exercising 3-5 times per week  Seasonal allergies  Continue Allegra and Flonase    Smoking  Patient has not smoked for 8 months  Congratulated patient for quitting smoking and encouraged to continue to refrain from it  Diagnoses and all orders for this visit:    Essential hypertension  -     Lipid panel; Future  -     Comprehensive metabolic panel; Future  -     HEMOGLOBIN A1C W/ EAG ESTIMATION; Future    Generalized anxiety disorder  -     escitalopram (LEXAPRO) 10 mg tablet; Take 1 tablet (10 mg total) by mouth daily    Mild intermittent asthmatic bronchitis with acute exacerbation  -     albuterol (ProAir HFA) 90 mcg/act inhaler; Use 2 puffs every 4 hours as needed for wheezing or shortness of breath  Fatigue, unspecified type  -     TSH, 3rd generation with Free T4 reflex; Future  -     Vitamin D 25 hydroxy; Future    Subclinical hypothyroidism    Class 1 obesity due to excess calories without serious comorbidity with body mass index (BMI) of 34 0 to 34 9 in adult    Tobacco dependence    Seasonal allergies         Subjective     Chief Complaint   Patient presents with    Medication Refill       63-year-old female presented to office for follow-up visit  Patient is currently on Zoloft for anxiety and depression, reports that because of recent stresses in her life she feels that it is not helping anymore  She denies any suicidal or homicidal ideation, but would like to try some other medication  She is also taking Allegra and Flonase for allergies, and reports that it is well controlled with it  She is also worried about her weight  Reports that she has been watching her diet, avoids soda, and walks half a mile every day but still her weight has been the same  She also reports that she has not smoked for 8 months now, and Chantix really helped  Medication Refill   Pertinent negatives include no abdominal pain, chest pain, chills, congestion, coughing, fever, headaches, nausea or vomiting  The following portions of the patient's history were reviewed and updated as appropriate: allergies, current medications, past family history, past medical history, past social history, past surgical history and problem list     Review of Systems   Constitutional: Negative for activity change, chills and fever  HENT: Negative for congestion  Respiratory: Negative for cough and shortness of breath  Cardiovascular: Negative for chest pain  Gastrointestinal: Negative for abdominal pain, diarrhea, nausea and vomiting  Genitourinary: Negative for difficulty urinating  Neurological: Negative for dizziness and headaches  Psychiatric/Behavioral: The patient is nervous/anxious  Objective     Vitals:Blood pressure 120/72, pulse 82, temperature 97 8 °F (36 6 °C), resp  rate 16, height 5' 3" (1 6 m), weight 93 1 kg (205 lb 3 2 oz), last menstrual period 01/01/2016, not currently breastfeeding  Physical Exam:  Physical Exam  Vitals signs reviewed  Constitutional:       Appearance: Normal appearance  She is well-developed  HENT:      Head: Normocephalic and atraumatic        Right Ear: External ear normal       Left Ear: External ear normal       Nose: Comments: Wearing mask  Eyes:      Conjunctiva/sclera: Conjunctivae normal       Pupils: Pupils are equal, round, and reactive to light  Neck:      Musculoskeletal: Normal range of motion and neck supple  Cardiovascular:      Rate and Rhythm: Normal rate and regular rhythm  Heart sounds: Normal heart sounds  No murmur  No friction rub  Pulmonary:      Effort: Pulmonary effort is normal  No respiratory distress  Breath sounds: Normal breath sounds  No wheezing or rales  Abdominal:      General: Bowel sounds are normal  There is no distension  Palpations: Abdomen is soft  Tenderness: There is no abdominal tenderness  Musculoskeletal: Normal range of motion  Skin:     General: Skin is warm and dry  Neurological:      Mental Status: She is alert and oriented to person, place, and time           Lab Results:

## 2020-09-10 NOTE — ASSESSMENT & PLAN NOTE
Patient has not smoked for 8 months  Congratulated patient for quitting smoking and encouraged to continue to refrain from it

## 2020-09-10 NOTE — ASSESSMENT & PLAN NOTE
BMI Counseling: Body mass index is 36 35 kg/m²  The BMI is above normal  Nutrition recommendations include reducing portion sizes, decreasing overall calorie intake and 3-5 servings of fruits/vegetables daily  Exercise recommendations include moderate aerobic physical activity for 150 minutes/week and exercising 3-5 times per week

## 2020-09-12 ENCOUNTER — APPOINTMENT (OUTPATIENT)
Dept: LAB | Facility: HOSPITAL | Age: 50
End: 2020-09-12
Attending: FAMILY MEDICINE
Payer: COMMERCIAL

## 2020-09-12 DIAGNOSIS — I10 ESSENTIAL HYPERTENSION: ICD-10-CM

## 2020-09-12 DIAGNOSIS — R53.83 FATIGUE, UNSPECIFIED TYPE: ICD-10-CM

## 2020-09-12 DIAGNOSIS — R74.8 ELEVATED ALKALINE PHOSPHATASE LEVEL: ICD-10-CM

## 2020-09-12 LAB
25(OH)D3 SERPL-MCNC: 27.3 NG/ML (ref 30–100)
ALBUMIN SERPL BCP-MCNC: 3.7 G/DL (ref 3.5–5)
ALP SERPL-CCNC: 122 U/L (ref 46–116)
ALT SERPL W P-5'-P-CCNC: 18 U/L (ref 12–78)
ANION GAP SERPL CALCULATED.3IONS-SCNC: 3 MMOL/L (ref 4–13)
AST SERPL W P-5'-P-CCNC: 9 U/L (ref 5–45)
BILIRUB SERPL-MCNC: 0.4 MG/DL (ref 0.2–1)
BUN SERPL-MCNC: 21 MG/DL (ref 5–25)
CALCIUM SERPL-MCNC: 8.6 MG/DL (ref 8.3–10.1)
CHLORIDE SERPL-SCNC: 106 MMOL/L (ref 100–108)
CHOLEST SERPL-MCNC: 170 MG/DL (ref 50–200)
CO2 SERPL-SCNC: 32 MMOL/L (ref 21–32)
CREAT SERPL-MCNC: 0.68 MG/DL (ref 0.6–1.3)
EST. AVERAGE GLUCOSE BLD GHB EST-MCNC: 128 MG/DL
GFR SERPL CREATININE-BSD FRML MDRD: 102 ML/MIN/1.73SQ M
GLUCOSE P FAST SERPL-MCNC: 97 MG/DL (ref 65–99)
HBA1C MFR BLD: 6.1 %
HDLC SERPL-MCNC: 40 MG/DL
LDLC SERPL CALC-MCNC: 108 MG/DL (ref 0–100)
NONHDLC SERPL-MCNC: 130 MG/DL
POTASSIUM SERPL-SCNC: 3.7 MMOL/L (ref 3.5–5.3)
PROT SERPL-MCNC: 7.5 G/DL (ref 6.4–8.2)
SODIUM SERPL-SCNC: 141 MMOL/L (ref 136–145)
TRIGL SERPL-MCNC: 109 MG/DL
TSH SERPL DL<=0.05 MIU/L-ACNC: 2.78 UIU/ML (ref 0.36–3.74)

## 2020-09-12 PROCEDURE — 84443 ASSAY THYROID STIM HORMONE: CPT

## 2020-09-12 PROCEDURE — 80053 COMPREHEN METABOLIC PANEL: CPT

## 2020-09-12 PROCEDURE — 36415 COLL VENOUS BLD VENIPUNCTURE: CPT

## 2020-09-12 PROCEDURE — 82306 VITAMIN D 25 HYDROXY: CPT

## 2020-09-12 PROCEDURE — 82977 ASSAY OF GGT: CPT

## 2020-09-12 PROCEDURE — 80061 LIPID PANEL: CPT

## 2020-09-12 PROCEDURE — 83036 HEMOGLOBIN GLYCOSYLATED A1C: CPT

## 2020-09-13 DIAGNOSIS — R74.8 ELEVATED ALKALINE PHOSPHATASE LEVEL: Primary | ICD-10-CM

## 2020-09-13 LAB — GGT SERPL-CCNC: 23 U/L (ref 5–85)

## 2020-09-25 ENCOUNTER — HOSPITAL ENCOUNTER (OUTPATIENT)
Dept: RADIOLOGY | Age: 50
Discharge: HOME | End: 2020-09-25
Payer: COMMERCIAL

## 2020-09-25 VITALS — WEIGHT: 205 LBS | HEIGHT: 63 IN | BODY MASS INDEX: 36.32 KG/M2

## 2020-09-25 DIAGNOSIS — Z12.31 ENCOUNTER FOR SCREENING MAMMOGRAM FOR MALIGNANT NEOPLASM OF BREAST: ICD-10-CM

## 2020-09-25 PROCEDURE — 77063 BREAST TOMOSYNTHESIS BI: CPT

## 2020-09-25 PROCEDURE — 77067 SCR MAMMO BI INCL CAD: CPT

## 2020-09-26 DIAGNOSIS — E78.5 DYSLIPIDEMIA: ICD-10-CM

## 2020-09-26 RX ORDER — ATORVASTATIN CALCIUM 40 MG/1
TABLET, FILM COATED ORAL
Qty: 60 TABLET | Refills: 2 | Status: SHIPPED | OUTPATIENT
Start: 2020-09-26 | End: 2021-03-22

## 2020-10-03 DIAGNOSIS — J45.21 MILD INTERMITTENT ASTHMATIC BRONCHITIS WITH ACUTE EXACERBATION: ICD-10-CM

## 2020-10-03 RX ORDER — ALBUTEROL SULFATE 90 UG/1
AEROSOL, METERED RESPIRATORY (INHALATION)
Qty: 8.5 INHALER | Refills: 0 | Status: SHIPPED | OUTPATIENT
Start: 2020-10-03 | End: 2020-10-31 | Stop reason: SDUPTHER

## 2020-10-05 DIAGNOSIS — F41.1 GENERALIZED ANXIETY DISORDER: ICD-10-CM

## 2020-10-05 RX ORDER — ESCITALOPRAM OXALATE 10 MG/1
TABLET ORAL
Qty: 30 TABLET | Refills: 1 | Status: SHIPPED | OUTPATIENT
Start: 2020-10-05 | End: 2020-10-30

## 2020-10-30 DIAGNOSIS — F41.1 GENERALIZED ANXIETY DISORDER: ICD-10-CM

## 2020-10-30 RX ORDER — ESCITALOPRAM OXALATE 10 MG/1
TABLET ORAL
Qty: 30 TABLET | Refills: 1 | Status: SHIPPED | OUTPATIENT
Start: 2020-10-30 | End: 2020-11-26 | Stop reason: SDUPTHER

## 2020-10-31 DIAGNOSIS — J45.21 MILD INTERMITTENT ASTHMATIC BRONCHITIS WITH ACUTE EXACERBATION: ICD-10-CM

## 2020-10-31 RX ORDER — ALBUTEROL SULFATE 90 UG/1
AEROSOL, METERED RESPIRATORY (INHALATION)
Qty: 8.5 INHALER | Refills: 0 | Status: SHIPPED | OUTPATIENT
Start: 2020-10-31 | End: 2020-12-22

## 2020-11-08 DIAGNOSIS — I10 ESSENTIAL HYPERTENSION: ICD-10-CM

## 2020-11-09 RX ORDER — HYDROCHLOROTHIAZIDE 25 MG/1
TABLET ORAL
Qty: 60 TABLET | Refills: 2 | Status: SHIPPED | OUTPATIENT
Start: 2020-11-09 | End: 2021-05-11

## 2020-11-13 ENCOUNTER — TELEMEDICINE (OUTPATIENT)
Dept: FAMILY MEDICINE CLINIC | Facility: CLINIC | Age: 50
End: 2020-11-13

## 2020-11-13 DIAGNOSIS — N89.8 VAGINAL ITCHING: Primary | ICD-10-CM

## 2020-11-13 PROCEDURE — 99214 OFFICE O/P EST MOD 30 MIN: CPT | Performed by: FAMILY MEDICINE

## 2020-11-13 PROCEDURE — 1036F TOBACCO NON-USER: CPT | Performed by: FAMILY MEDICINE

## 2020-11-13 RX ORDER — FLUCONAZOLE 150 MG/1
150 TABLET ORAL ONCE
Qty: 1 TABLET | Refills: 0 | Status: SHIPPED | OUTPATIENT
Start: 2020-11-13 | End: 2020-11-13

## 2020-11-26 DIAGNOSIS — F41.1 GENERALIZED ANXIETY DISORDER: ICD-10-CM

## 2020-11-26 DIAGNOSIS — J30.9 ALLERGIC RHINITIS, UNSPECIFIED SEASONALITY, UNSPECIFIED TRIGGER: ICD-10-CM

## 2020-11-26 RX ORDER — FLUTICASONE PROPIONATE 50 MCG
SPRAY, SUSPENSION (ML) NASAL
Qty: 16 ML | Refills: 1 | Status: SHIPPED | OUTPATIENT
Start: 2020-11-26 | End: 2020-12-04

## 2020-11-26 RX ORDER — ESCITALOPRAM OXALATE 10 MG/1
TABLET ORAL
Qty: 30 TABLET | Refills: 1 | Status: SHIPPED | OUTPATIENT
Start: 2020-11-26 | End: 2021-01-23 | Stop reason: SDUPTHER

## 2020-11-30 ENCOUNTER — OFFICE VISIT (OUTPATIENT)
Dept: URGENT CARE | Age: 50
End: 2020-11-30
Payer: COMMERCIAL

## 2020-11-30 VITALS — OXYGEN SATURATION: 96 % | HEART RATE: 87 BPM | RESPIRATION RATE: 18 BRPM | TEMPERATURE: 97.4 F

## 2020-11-30 DIAGNOSIS — J06.9 ACUTE UPPER RESPIRATORY INFECTION: Primary | ICD-10-CM

## 2020-11-30 PROCEDURE — 87637 SARSCOV2&INF A&B&RSV AMP PRB: CPT | Performed by: FAMILY MEDICINE

## 2020-11-30 PROCEDURE — 99213 OFFICE O/P EST LOW 20 MIN: CPT | Performed by: FAMILY MEDICINE

## 2020-12-02 LAB
FLUAV RNA NPH QL NAA+PROBE: NOT DETECTED
FLUBV RNA NPH QL NAA+PROBE: NOT DETECTED
RSV RNA NPH QL NAA+PROBE: NOT DETECTED
SARS-COV-2 RNA NPH QL NAA+PROBE: NOT DETECTED

## 2020-12-03 ENCOUNTER — TELEPHONE (OUTPATIENT)
Dept: URGENT CARE | Age: 50
End: 2020-12-03

## 2020-12-04 ENCOUNTER — TELEMEDICINE (OUTPATIENT)
Dept: FAMILY MEDICINE CLINIC | Facility: CLINIC | Age: 50
End: 2020-12-04

## 2020-12-04 DIAGNOSIS — R68.89 SENSATION OF SWOLLEN THROAT: ICD-10-CM

## 2020-12-04 DIAGNOSIS — J30.9 ALLERGIC RHINITIS, UNSPECIFIED SEASONALITY, UNSPECIFIED TRIGGER: ICD-10-CM

## 2020-12-04 DIAGNOSIS — R05.9 COUGH: Primary | ICD-10-CM

## 2020-12-04 PROCEDURE — 99213 OFFICE O/P EST LOW 20 MIN: CPT | Performed by: FAMILY MEDICINE

## 2020-12-04 RX ORDER — DOXYCYCLINE HYCLATE 100 MG/1
100 CAPSULE ORAL EVERY 12 HOURS SCHEDULED
Qty: 10 CAPSULE | Refills: 0 | Status: SHIPPED | OUTPATIENT
Start: 2020-12-04 | End: 2020-12-09

## 2020-12-04 RX ORDER — FLUTICASONE PROPIONATE 50 MCG
1 SPRAY, SUSPENSION (ML) NASAL DAILY
Qty: 1 BOTTLE | Refills: 3 | Status: SHIPPED | OUTPATIENT
Start: 2020-12-04

## 2020-12-04 RX ORDER — GUAIFENESIN 600 MG
1200 TABLET, EXTENDED RELEASE 12 HR ORAL EVERY 12 HOURS SCHEDULED
Qty: 28 TABLET | Refills: 0 | Status: SHIPPED | OUTPATIENT
Start: 2020-12-04 | End: 2020-12-11

## 2020-12-07 ENCOUNTER — TELEMEDICINE (OUTPATIENT)
Dept: FAMILY MEDICINE CLINIC | Facility: CLINIC | Age: 50
End: 2020-12-07

## 2020-12-07 DIAGNOSIS — J22 LOWER RESPIRATORY TRACT INFECTION: Primary | ICD-10-CM

## 2020-12-07 PROCEDURE — 99214 OFFICE O/P EST MOD 30 MIN: CPT | Performed by: FAMILY MEDICINE

## 2020-12-07 RX ORDER — CEFDINIR 300 MG/1
300 CAPSULE ORAL EVERY 12 HOURS SCHEDULED
Qty: 10 CAPSULE | Refills: 0 | Status: SHIPPED | OUTPATIENT
Start: 2020-12-07 | End: 2020-12-12

## 2020-12-07 RX ORDER — AZITHROMYCIN 250 MG/1
TABLET, FILM COATED ORAL
Qty: 6 TABLET | Refills: 0 | Status: SHIPPED | OUTPATIENT
Start: 2020-12-07 | End: 2020-12-12

## 2020-12-09 ENCOUNTER — TELEMEDICINE (OUTPATIENT)
Dept: FAMILY MEDICINE CLINIC | Facility: CLINIC | Age: 50
End: 2020-12-09

## 2020-12-09 DIAGNOSIS — J22 LOWER RESPIRATORY TRACT INFECTION: Primary | ICD-10-CM

## 2020-12-09 PROCEDURE — 99212 OFFICE O/P EST SF 10 MIN: CPT | Performed by: FAMILY MEDICINE

## 2020-12-11 ENCOUNTER — TELEMEDICINE (OUTPATIENT)
Dept: FAMILY MEDICINE CLINIC | Facility: CLINIC | Age: 50
End: 2020-12-11

## 2020-12-11 VITALS — WEIGHT: 205 LBS | BODY MASS INDEX: 36.32 KG/M2 | HEIGHT: 63 IN | TEMPERATURE: 98.6 F

## 2020-12-11 DIAGNOSIS — J22 LOWER RESPIRATORY TRACT INFECTION: Primary | ICD-10-CM

## 2020-12-11 PROCEDURE — 99213 OFFICE O/P EST LOW 20 MIN: CPT | Performed by: FAMILY MEDICINE

## 2020-12-11 PROCEDURE — 3008F BODY MASS INDEX DOCD: CPT | Performed by: FAMILY MEDICINE

## 2020-12-14 ENCOUNTER — TELEMEDICINE (OUTPATIENT)
Dept: FAMILY MEDICINE CLINIC | Facility: CLINIC | Age: 50
End: 2020-12-14

## 2020-12-14 VITALS
TEMPERATURE: 98.4 F | BODY MASS INDEX: 37.16 KG/M2 | SYSTOLIC BLOOD PRESSURE: 149 MMHG | WEIGHT: 209.8 LBS | DIASTOLIC BLOOD PRESSURE: 94 MMHG

## 2020-12-14 DIAGNOSIS — J22 LOWER RESPIRATORY TRACT INFECTION: Primary | ICD-10-CM

## 2020-12-14 PROCEDURE — 99213 OFFICE O/P EST LOW 20 MIN: CPT | Performed by: FAMILY MEDICINE

## 2020-12-14 PROCEDURE — 1036F TOBACCO NON-USER: CPT | Performed by: FAMILY MEDICINE

## 2020-12-14 PROCEDURE — 3080F DIAST BP >= 90 MM HG: CPT | Performed by: FAMILY MEDICINE

## 2020-12-14 PROCEDURE — 3077F SYST BP >= 140 MM HG: CPT | Performed by: FAMILY MEDICINE

## 2020-12-14 RX ORDER — PREDNISONE 20 MG/1
40 TABLET ORAL DAILY
Qty: 10 TABLET | Refills: 0 | Status: SHIPPED | OUTPATIENT
Start: 2020-12-14 | End: 2020-12-21

## 2020-12-21 ENCOUNTER — OFFICE VISIT (OUTPATIENT)
Dept: URGENT CARE | Age: 50
End: 2020-12-21
Payer: COMMERCIAL

## 2020-12-21 VITALS
RESPIRATION RATE: 18 BRPM | DIASTOLIC BLOOD PRESSURE: 82 MMHG | SYSTOLIC BLOOD PRESSURE: 145 MMHG | TEMPERATURE: 98.9 F | OXYGEN SATURATION: 95 % | HEART RATE: 88 BPM

## 2020-12-21 DIAGNOSIS — H65.91 RIGHT NON-SUPPURATIVE OTITIS MEDIA: Primary | ICD-10-CM

## 2020-12-21 DIAGNOSIS — R05.9 COUGH: ICD-10-CM

## 2020-12-21 PROCEDURE — 99213 OFFICE O/P EST LOW 20 MIN: CPT | Performed by: PHYSICIAN ASSISTANT

## 2020-12-21 RX ORDER — BENZONATATE 100 MG/1
100 CAPSULE ORAL 3 TIMES DAILY PRN
Qty: 20 CAPSULE | Refills: 0 | Status: SHIPPED | OUTPATIENT
Start: 2020-12-21

## 2020-12-21 RX ORDER — AMOXICILLIN AND CLAVULANATE POTASSIUM 875; 125 MG/1; MG/1
1 TABLET, FILM COATED ORAL EVERY 12 HOURS SCHEDULED
Qty: 20 TABLET | Refills: 0 | Status: SHIPPED | OUTPATIENT
Start: 2020-12-21 | End: 2020-12-31

## 2020-12-22 DIAGNOSIS — J45.21 MILD INTERMITTENT ASTHMATIC BRONCHITIS WITH ACUTE EXACERBATION: ICD-10-CM

## 2020-12-22 RX ORDER — ALBUTEROL SULFATE 90 UG/1
AEROSOL, METERED RESPIRATORY (INHALATION)
Qty: 8.5 INHALER | Refills: 0 | Status: SHIPPED | OUTPATIENT
Start: 2020-12-22 | End: 2021-01-15

## 2020-12-27 ENCOUNTER — NURSE TRIAGE (OUTPATIENT)
Dept: OTHER | Facility: OTHER | Age: 50
End: 2020-12-27

## 2021-01-15 DIAGNOSIS — J45.21 MILD INTERMITTENT ASTHMATIC BRONCHITIS WITH ACUTE EXACERBATION: ICD-10-CM

## 2021-01-15 RX ORDER — ALBUTEROL SULFATE 90 UG/1
AEROSOL, METERED RESPIRATORY (INHALATION)
Qty: 8.5 INHALER | Refills: 0 | Status: SHIPPED | OUTPATIENT
Start: 2021-01-15

## 2021-01-21 ENCOUNTER — TELEMEDICINE (OUTPATIENT)
Dept: FAMILY MEDICINE CLINIC | Facility: CLINIC | Age: 51
End: 2021-01-21

## 2021-01-21 DIAGNOSIS — Z20.822 EXPOSURE TO COVID-19 VIRUS: ICD-10-CM

## 2021-01-21 DIAGNOSIS — Z20.822 EXPOSURE TO COVID-19 VIRUS: Primary | ICD-10-CM

## 2021-01-21 PROCEDURE — U0005 INFEC AGEN DETEC AMPLI PROBE: HCPCS | Performed by: FAMILY MEDICINE

## 2021-01-21 PROCEDURE — U0003 INFECTIOUS AGENT DETECTION BY NUCLEIC ACID (DNA OR RNA); SEVERE ACUTE RESPIRATORY SYNDROME CORONAVIRUS 2 (SARS-COV-2) (CORONAVIRUS DISEASE [COVID-19]), AMPLIFIED PROBE TECHNIQUE, MAKING USE OF HIGH THROUGHPUT TECHNOLOGIES AS DESCRIBED BY CMS-2020-01-R: HCPCS | Performed by: FAMILY MEDICINE

## 2021-01-21 PROCEDURE — 99213 OFFICE O/P EST LOW 20 MIN: CPT | Performed by: FAMILY MEDICINE

## 2021-01-21 NOTE — PROGRESS NOTES
Virtual Regular Visit      Assessment/Plan:    Problem List Items Addressed This Visit        Other    Exposure to COVID-19 virus - Primary     Pt is currently asx  Pt reports they were in contact with a family member who tested positive for COVID  Pt was unmasked during that encounter for a while during dinner  COVID test ordered, will call pt with results  She is aware to qauarantine at home until results come back  Hygiene measures per CDC reviewed( wearing of mask at home, cleaning commonly used space, hand washing)           Relevant Orders    Novel Coronavirus (Covid-19),PCR [facility] - Collected at [facility] or [facility]               Reason for visit is   Chief Complaint   Patient presents with    Virtual Regular Visit        Encounter provider Augusta Contreras MD    Provider located at [id number] [place] [address]   [phone]      Recent Visits  No visits were found meeting these conditions  Showing recent visits within past 7 days and meeting all other requirements     Today's Visits  Date Type Provider Dept   01/21/21 Telemedicine Augusta Contreras [facility]   Showing today's visits and meeting all other requirements     Future Appointments  No visits were found meeting these conditions  Showing future appointments within next 150 days and meeting all other requirements        The patient was identified by name and date of birth  Verónica Randle was informed that this is a telemedicine visit and that the visit is being conducted through gripNote and patient was informed that this is a secure, HIPAA-compliant platform  She agrees to proceed     My office door was closed  No one else was in the room  She acknowledged consent and understanding of privacy and security of the video platform  The patient has agreed to participate and understands they can discontinue the visit at any time      Patient is aware this is a billable service  Subjective  Audrey Matta is a 48 y o  female    HPI   Virtual video visit for pt who reports she was at dinner with her daughter on 01/14 and she found out today her daughter tested positive for COVID today  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  She is however asymptomatic  Denies fever, chills, n/v, cough, loss of taste or smell, changes in bladder or bowel habits/rash      Past Medical History:   Diagnosis Date    Depression 11/12/2019    Discharge from right nipple     last assessed 03/20/2013    GERD (gastroesophageal reflux disease) 3/3/2020    Hyperlipidemia     Hypertension     Loose stools 1/30/2018    Subclinical hypothyroidism 4/26/2019       Past Surgical History:   Procedure Laterality Date    CARPAL TUNNEL RELEASE      AK HYSTEROSCOPY,W/ENDO BX N/A 11/20/2018    Procedure: DILATATION AND CURETTAGE (D&C) WITH HYSTEROSCOPY;  Surgeon: Dana Guillen DO;  [facility];  Service: Gynecology    TUBAL LIGATION      WRIST SURGERY Bilateral        Current Outpatient Medications   Medication Sig Dispense Refill    albuterol (PROVENTIL HFA,VENTOLIN HFA) 90 mcg/act inhaler USE 2 PUFFS EVERY 4 HOURS AS NEEDED FOR WHEEZING OR SHORTNESS OF BREATH 8 5 Inhaler 0    atorvastatin (LIPITOR) 40 mg tablet TAKE 1 TABLET BY MOUTH EVERY DAY 60 tablet 2    benzonatate (TESSALON PERLES) 100 mg capsule Take 1 capsule (100 mg total) by mouth 3 (three) times a day as needed for cough 20 capsule 0    escitalopram (LEXAPRO) 10 mg tablet TAKE 1 TABLET BY MOUTH EVERY DAY 30 tablet 1    fluticasone (FLONASE) 50 mcg/act nasal spray 1 spray into each nostril daily 1 Bottle 3    hydrochlorothiazide (HYDRODIURIL) 25 mg tablet TAKE 1 TABLET BY MOUTH EVERY DAY 60 tablet 2    neomycin-polymyxin-hydrocortisone (CORTISPORIN) 0 35%-10,000 units/mL-1% otic suspension INSTILL 4 DROPS INTO AFFECTED EARS 4 TIMES A DAY FOR 5 TO 7 DAYS      pantoprazole (PROTONIX) 40 mg tablet Take 1 tablet (40 mg total) by mouth daily 60 tablet 0     No current facility-administered medications for this visit  Allergies   Allergen Reactions    Bee Pollen      Other reaction(s): Allergic Rhinitis, Sneezing    Pollen Extract Allergic Rhinitis and Sneezing       Review of Systems   Constitutional: Negative for appetite change, fatigue and fever  HENT: Negative for congestion  Respiratory: Negative for cough and shortness of breath  Cardiovascular: Negative for chest pain and leg swelling  Gastrointestinal: Negative for abdominal pain  Musculoskeletal: Negative for back pain  Neurological: Negative for headaches  Video Exam    There were no vitals filed for this visit  Physical Exam     I spent 15 minutes directly with the patient during this visit      VIRTUAL VISIT DISCLAIMER    iM Mendiola acknowledges that she has consented to an online visit or consultation  She understands that the online visit is based solely on information provided by her, and that, in the absence of a face-to-face physical evaluation by the physician, the diagnosis she receives is both limited and provisional in terms of accuracy and completeness  This is not intended to replace a full medical face-to-face evaluation by the physician  Mi Mendiola understands and accepts these terms

## 2021-01-21 NOTE — ASSESSMENT & PLAN NOTE
Pt is currently asx  Pt reports they were in contact with a family member who tested positive for COVID  Pt was unmasked during that encounter for a while during dinner  COVID test ordered, will call pt with results  She is aware to qauarantine at home until results come back    Hygiene measures per CDC reviewed( wearing of mask at home, cleaning commonly used space, hand washing)

## 2021-01-22 LAB — SARS-COV-2 RNA RESP QL NAA+PROBE: NEGATIVE

## 2021-01-23 ENCOUNTER — TELEPHONE (OUTPATIENT)
Dept: FAMILY MEDICINE CLINIC | Facility: CLINIC | Age: 51
End: 2021-01-23

## 2021-01-23 DIAGNOSIS — F41.1 GENERALIZED ANXIETY DISORDER: ICD-10-CM

## 2021-01-23 RX ORDER — ESCITALOPRAM OXALATE 10 MG/1
TABLET ORAL
Qty: 30 TABLET | Refills: 1 | Status: SHIPPED | OUTPATIENT
Start: 2021-01-23 | End: 2021-02-17

## 2021-02-07 ENCOUNTER — TELEMEDICINE (OUTPATIENT)
Dept: FAMILY MEDICINE CLINIC | Facility: CLINIC | Age: 51
End: 2021-02-07

## 2021-02-07 ENCOUNTER — NURSE TRIAGE (OUTPATIENT)
Dept: OTHER | Facility: OTHER | Age: 51
End: 2021-02-07

## 2021-02-07 DIAGNOSIS — J01.90 ACUTE BACTERIAL SINUSITIS: ICD-10-CM

## 2021-02-07 DIAGNOSIS — B96.89 ACUTE BACTERIAL SINUSITIS: ICD-10-CM

## 2021-02-07 DIAGNOSIS — J06.9 VIRAL UPPER RESPIRATORY TRACT INFECTION: Primary | ICD-10-CM

## 2021-02-07 PROCEDURE — 1036F TOBACCO NON-USER: CPT | Performed by: FAMILY MEDICINE

## 2021-02-07 PROCEDURE — 99213 OFFICE O/P EST LOW 20 MIN: CPT | Performed by: FAMILY MEDICINE

## 2021-02-07 RX ORDER — AMOXICILLIN 500 MG/1
500 CAPSULE ORAL EVERY 8 HOURS SCHEDULED
Qty: 15 CAPSULE | Refills: 0 | Status: SHIPPED | OUTPATIENT
Start: 2021-02-09 | End: 2021-02-14

## 2021-02-07 NOTE — ASSESSMENT & PLAN NOTE
Etiology of symptoms likely viral   Discussed symptoms control options with patient such as use of Flonase and Tylenol  As patient is worried about worsening symptoms advised patient that I will prescribe antibiotics that she can use if symptoms do not improve by Tuesday, concern for bacterial rhinosinusitis  Patient agreed to plan

## 2021-02-07 NOTE — TELEPHONE ENCOUNTER
Reason for Disposition   [1] Coughed up blood-tinged sputum AND [2] more than once    Additional Information   Cough is main symptom    Answer Assessment - Initial Assessment Questions  1  ONSET: "When did the nasal discharge start?"       3 days ago  2  AMOUNT: "How much discharge is there?"       Thick   3  COUGH: "Do you have a cough?" If yes, ask: "Describe the color of your sputum" (clear, white, yellow, green)      Yes productive  4  RESPIRATORY DISTRESS: "Describe your breathing "       No but stuffy and has lots of thich mucus  5  FEVER: "Do you have a fever?" If so, ask: "What is your temperature, how was it measured, and when did it start?"     no  6  SEVERITY: "Overall, how bad are you feeling right now?" (e g , doesn't interfere with normal activities, staying home from school/work, staying in bed)       Staying home in bed   7  OTHER SYMPTOMS: "Do you have any other symptoms?" (e g , sore throat, earache, wheezing, vomiting)      Fever 2 days ago but gone now   8   PREGNANCY: "Is there any chance you are pregnant?" "When was your last menstrual period?"      no    Protocols used: COUGH - ACUTE PRODUCTIVE-ADULT-AH, COMMON COLD-ADULT-AH

## 2021-02-07 NOTE — TELEPHONE ENCOUNTER
Regarding: Negative COVID-UPPER RESPIRATORY   ----- Message from John Hartman sent at 2/7/2021  2:06 PM EST -----  "[facility]  I have been running a fever  I have an upper respiratory infection and I have blood when I cough and blowing my nose "   Pt wanted an antibiotic as she has already been taking Mucinex-D and nasal flushes with no relief so on call provider was paged and said she will contact pt directly

## 2021-02-07 NOTE — PROGRESS NOTES
Virtual Brief Visit    Assessment/Plan:    Problem List Items Addressed This Visit        Respiratory    Viral upper respiratory tract infection - Primary     Etiology of symptoms likely viral   Discussed symptoms control options with patient such as use of Flonase and Tylenol  As patient is worried about worsening symptoms advised patient that I will prescribe antibiotics that she can use if symptoms do not improve by Tuesday, concern for bacterial rhinosinusitis  Patient agreed to plan  Other Visit Diagnoses     Acute bacterial sinusitis        Relevant Medications    amoxicillin (AMOXIL) 500 mg capsule (Start on 2/9/2021)                Reason for visit is   Chief Complaint   Patient presents with    Virtual Brief Visit        Encounter provider Anmol Callahan DO    Provider located at [facility] [phone] [facility] [address]  [phone]    Recent Visits  No visits were found meeting these conditions  Showing recent visits within past 7 days and meeting all other requirements     Today's Visits  Date Type Provider Dept   02/07/21 Telemedicine Anmol Callahan, [address] [place] [address] today's visits and meeting all other requirements     Future Appointments  No visits were found meeting these conditions  Showing future appointments within next 150 days and meeting all other requirements        After connecting through nScaled and patient was informed that this is a secure, HIPAA-compliant platform  She agrees to proceed  , the patient was identified by name and date of birth  Don Mooneyer was informed that this is a telemedicine visit and that the visit is being conducted [place] [facility] and patient was informed that this is a secure, HIPAA-compliant platform  She agrees to proceed     My office door was closed  No one else was in the room  She acknowledged consent and understanding of privacy and security of the platform   The patient has agreed to participate and understands she can discontinue the visit at any time  It was my intent to perform this visit via video technology but the patient was not able to do a video connection so the visit was completed via audio telephone only  Patient is aware this is a billable service  Subjective    Tank Bonner is a 48 y o  female who presents due to concern over worsening sinus infection  She states that she started to have rhinorrhea, nasal congestion, and fever on Thursday  She state she went to [facility] and tested negative for COVID and Flu  She was diagnosed with upper respiratory infection and sent home  However patient state she has continued worsening symptoms with fever of 101 degrees  Associated symptoms of headache and frontal tenderness  She states that she is concerned that she may develop worsening infection like she did in December         Past Medical History:   Diagnosis Date    Depression 11/12/2019    Discharge from right nipple     last assessed 03/20/2013    GERD (gastroesophageal reflux disease) 3/3/2020    Hyperlipidemia     Hypertension     Loose stools 1/30/2018    Subclinical hypothyroidism 4/26/2019       Past Surgical History:   Procedure Laterality Date    CARPAL TUNNEL RELEASE      [place] HYSTEROSCOPY,W/ENDO BX N/A 11/20/2018    Procedure: DILATATION AND CURETTAGE (D&C) WITH HYSTEROSCOPY;  Surgeon: Ronald Wills DO;  [facility];  Service: Gynecology    TUBAL LIGATION      WRIST SURGERY Bilateral        Current Outpatient Medications   Medication Sig Dispense Refill    albuterol (PROVENTIL HFA,VENTOLIN HFA) 90 mcg/act inhaler USE 2 PUFFS EVERY 4 HOURS AS NEEDED FOR WHEEZING OR SHORTNESS OF BREATH 8 5 Inhaler 0    [START ON 2/9/2021] amoxicillin (AMOXIL) 500 mg capsule Take 1 capsule (500 mg total) by mouth every 8 (eight) hours for 5 days 15 capsule 0    atorvastatin (LIPITOR) 40 mg tablet TAKE 1 TABLET BY MOUTH EVERY DAY 60 tablet 2    benzonatate (TESSALON PERLES) 100 mg capsule Take 1 capsule (100 mg total) by mouth 3 (three) times a day as needed for cough 20 capsule 0    escitalopram (LEXAPRO) 10 mg tablet TAKE 1 TABLET BY MOUTH EVERY DAY 30 tablet 1    fluticasone (FLONASE) 50 mcg/act nasal spray 1 spray into each nostril daily 1 Bottle 3    hydrochlorothiazide (HYDRODIURIL) 25 mg tablet TAKE 1 TABLET BY MOUTH EVERY DAY 60 tablet 2    neomycin-polymyxin-hydrocortisone (CORTISPORIN) 0 35%-10,000 units/mL-1% otic suspension INSTILL 4 DROPS INTO AFFECTED EARS 4 TIMES A DAY FOR 5 TO 7 DAYS      pantoprazole (PROTONIX) 40 mg tablet Take 1 tablet (40 mg total) by mouth daily 60 tablet 0     No current facility-administered medications for this visit  Allergies   Allergen Reactions    Bee Pollen      Other reaction(s): Allergic Rhinitis, Sneezing    Pollen Extract Allergic Rhinitis and Sneezing       Review of Systems   Constitutional: Positive for chills and fever  Negative for appetite change, diaphoresis and fatigue  HENT: Positive for congestion, rhinorrhea, sinus pressure and sinus pain  Negative for sore throat and trouble swallowing  Respiratory: Positive for cough  Negative for shortness of breath and wheezing  Cardiovascular: Negative for chest pain and palpitations  Gastrointestinal: Negative for abdominal pain  Musculoskeletal: Negative for back pain  Neurological: Positive for headaches  Negative for dizziness, weakness and light-headedness  There were no vitals filed for this visit  I spent 20 minutes directly with the patient during this visit    VIRTUAL VISIT DISCLAIMER    Bessy Wild acknowledges that she has consented to an online visit or consultation   She understands that the online visit is based solely on information provided by her, and that, in the absence of a face-to-face physical evaluation by the physician, the diagnosis she receives is both limited and provisional in terms of accuracy and completeness  This is not intended to replace a full medical face-to-face evaluation by the physician  Bessy Wild understands and accepts these terms

## 2021-02-17 DIAGNOSIS — F41.1 GENERALIZED ANXIETY DISORDER: ICD-10-CM

## 2021-02-17 RX ORDER — ESCITALOPRAM OXALATE 10 MG/1
TABLET ORAL
Qty: 30 TABLET | Refills: 1 | Status: SHIPPED | OUTPATIENT
Start: 2021-02-17 | End: 2021-03-14 | Stop reason: SDUPTHER

## 2021-02-22 ENCOUNTER — TELEPHONE (OUTPATIENT)
Dept: FAMILY MEDICINE CLINIC | Facility: CLINIC | Age: 51
End: 2021-02-22

## 2021-02-22 DIAGNOSIS — Z72.0 TOBACCO ABUSE: Primary | ICD-10-CM

## 2021-02-22 NOTE — TELEPHONE ENCOUNTER
Patient had visit with you on 2/7/21, she is wondering if you would order Chantix for her   She has started smoking again, had success with this in past

## 2021-02-24 RX ORDER — VARENICLINE TARTRATE 25 MG
KIT ORAL
Qty: 53 TABLET | Refills: 0 | Status: SHIPPED | OUTPATIENT
Start: 2021-02-24 | End: 2021-03-26 | Stop reason: ALTCHOICE

## 2021-03-13 ENCOUNTER — HOSPITAL ENCOUNTER (EMERGENCY)
Facility: HOSPITAL | Age: 51
Discharge: HOME | End: 2021-03-13
Attending: EMERGENCY MEDICINE | Admitting: EMERGENCY MEDICINE
Payer: COMMERCIAL

## 2021-03-13 ENCOUNTER — APPOINTMENT (EMERGENCY)
Dept: RADIOLOGY | Facility: HOSPITAL | Age: 51
End: 2021-03-13
Payer: COMMERCIAL

## 2021-03-13 VITALS
HEART RATE: 97 BPM | DIASTOLIC BLOOD PRESSURE: 73 MMHG | RESPIRATION RATE: 18 BRPM | TEMPERATURE: 98 F | OXYGEN SATURATION: 98 % | WEIGHT: 215 LBS | SYSTOLIC BLOOD PRESSURE: 160 MMHG | BODY MASS INDEX: 38.09 KG/M2

## 2021-03-13 DIAGNOSIS — R10.9 RIGHT FLANK PAIN: Primary | ICD-10-CM

## 2021-03-13 DIAGNOSIS — F41.1 GENERALIZED ANXIETY DISORDER: ICD-10-CM

## 2021-03-13 LAB
ALBUMIN SERPL BCP-MCNC: 3.9 G/DL (ref 3.5–5)
ALP SERPL-CCNC: 143 U/L (ref 46–116)
ALT SERPL W P-5'-P-CCNC: 23 U/L (ref 12–78)
ANION GAP SERPL CALCULATED.3IONS-SCNC: 5 MMOL/L (ref 4–13)
AST SERPL W P-5'-P-CCNC: 14 U/L (ref 5–45)
BACTERIA UR QL AUTO: NORMAL /HPF
BASOPHILS # BLD AUTO: 0.04 THOUSANDS/ΜL (ref 0–0.1)
BASOPHILS NFR BLD AUTO: 0 % (ref 0–1)
BILIRUB SERPL-MCNC: 0.24 MG/DL (ref 0.2–1)
BILIRUB UR QL STRIP: NEGATIVE
BUN SERPL-MCNC: 16 MG/DL (ref 5–25)
CALCIUM SERPL-MCNC: 8.9 MG/DL (ref 8.3–10.1)
CHLORIDE SERPL-SCNC: 101 MMOL/L (ref 100–108)
CLARITY UR: CLEAR
CLARITY, POC: CLEAR
CO2 SERPL-SCNC: 32 MMOL/L (ref 21–32)
COLOR UR: YELLOW
COLOR, POC: YELLOW
CREAT SERPL-MCNC: 0.79 MG/DL (ref 0.6–1.3)
EOSINOPHIL # BLD AUTO: 0.33 THOUSAND/ΜL (ref 0–0.61)
EOSINOPHIL NFR BLD AUTO: 3 % (ref 0–6)
ERYTHROCYTE [DISTWIDTH] IN BLOOD BY AUTOMATED COUNT: 13.3 % (ref 11.6–15.1)
GFR SERPL CREATININE-BSD FRML MDRD: 87 ML/MIN/1.73SQ M
GLUCOSE SERPL-MCNC: 142 MG/DL (ref 65–140)
GLUCOSE UR STRIP-MCNC: NEGATIVE MG/DL
HCT VFR BLD AUTO: 39.5 % (ref 34.8–46.1)
HGB BLD-MCNC: 13.1 G/DL (ref 11.5–15.4)
HGB UR QL STRIP.AUTO: ABNORMAL
IMM GRANULOCYTES # BLD AUTO: 0.04 THOUSAND/UL (ref 0–0.2)
IMM GRANULOCYTES NFR BLD AUTO: 0 % (ref 0–2)
KETONES UR STRIP-MCNC: NEGATIVE MG/DL
LEUKOCYTE ESTERASE UR QL STRIP: ABNORMAL
LIPASE SERPL-CCNC: 92 U/L (ref 73–393)
LYMPHOCYTES # BLD AUTO: 3.2 THOUSANDS/ΜL (ref 0.6–4.47)
LYMPHOCYTES NFR BLD AUTO: 27 % (ref 14–44)
MCH RBC QN AUTO: 29.6 PG (ref 26.8–34.3)
MCHC RBC AUTO-ENTMCNC: 33.2 G/DL (ref 31.4–37.4)
MCV RBC AUTO: 89 FL (ref 82–98)
MONOCYTES # BLD AUTO: 0.8 THOUSAND/ΜL (ref 0.17–1.22)
MONOCYTES NFR BLD AUTO: 7 % (ref 4–12)
NEUTROPHILS # BLD AUTO: 7.26 THOUSANDS/ΜL (ref 1.85–7.62)
NEUTS SEG NFR BLD AUTO: 63 % (ref 43–75)
NITRITE UR QL STRIP: NEGATIVE
NON-SQ EPI CELLS URNS QL MICRO: NORMAL /HPF
NRBC BLD AUTO-RTO: 0 /100 WBCS
PH UR STRIP.AUTO: 7 [PH] (ref 4.5–8)
PLATELET # BLD AUTO: 302 THOUSANDS/UL (ref 149–390)
PMV BLD AUTO: 9.3 FL (ref 8.9–12.7)
POTASSIUM SERPL-SCNC: 3.3 MMOL/L (ref 3.5–5.3)
PROT SERPL-MCNC: 7.6 G/DL (ref 6.4–8.2)
PROT UR STRIP-MCNC: NEGATIVE MG/DL
RBC # BLD AUTO: 4.42 MILLION/UL (ref 3.81–5.12)
RBC #/AREA URNS AUTO: NORMAL /HPF
SODIUM SERPL-SCNC: 138 MMOL/L (ref 136–145)
SP GR UR STRIP.AUTO: 1.02 (ref 1–1.03)
UROBILINOGEN UR QL STRIP.AUTO: 0.2 E.U./DL
WBC # BLD AUTO: 11.67 THOUSAND/UL (ref 4.31–10.16)
WBC #/AREA URNS AUTO: NORMAL /HPF

## 2021-03-13 PROCEDURE — G1004 CDSM NDSC: HCPCS

## 2021-03-13 PROCEDURE — 81001 URINALYSIS AUTO W/SCOPE: CPT

## 2021-03-13 PROCEDURE — 74177 CT ABD & PELVIS W/CONTRAST: CPT

## 2021-03-13 PROCEDURE — 36415 COLL VENOUS BLD VENIPUNCTURE: CPT | Performed by: EMERGENCY MEDICINE

## 2021-03-13 PROCEDURE — 80053 COMPREHEN METABOLIC PANEL: CPT | Performed by: EMERGENCY MEDICINE

## 2021-03-13 PROCEDURE — 99284 EMERGENCY DEPT VISIT MOD MDM: CPT | Performed by: EMERGENCY MEDICINE

## 2021-03-13 PROCEDURE — 99284 EMERGENCY DEPT VISIT MOD MDM: CPT

## 2021-03-13 PROCEDURE — 96374 THER/PROPH/DIAG INJ IV PUSH: CPT

## 2021-03-13 PROCEDURE — 85025 COMPLETE CBC W/AUTO DIFF WBC: CPT | Performed by: EMERGENCY MEDICINE

## 2021-03-13 PROCEDURE — 83690 ASSAY OF LIPASE: CPT | Performed by: EMERGENCY MEDICINE

## 2021-03-13 RX ORDER — LIDOCAINE 50 MG/G
1 PATCH TOPICAL ONCE
Status: DISCONTINUED | OUTPATIENT
Start: 2021-03-13 | End: 2021-03-13 | Stop reason: HOSPADM

## 2021-03-13 RX ORDER — KETOROLAC TROMETHAMINE 30 MG/ML
15 INJECTION, SOLUTION INTRAMUSCULAR; INTRAVENOUS ONCE
Status: COMPLETED | OUTPATIENT
Start: 2021-03-13 | End: 2021-03-13

## 2021-03-13 RX ADMIN — LIDOCAINE 1 PATCH: 50 PATCH TOPICAL at 19:33

## 2021-03-13 RX ADMIN — KETOROLAC TROMETHAMINE 15 MG: 30 INJECTION, SOLUTION INTRAMUSCULAR; INTRAVENOUS at 18:05

## 2021-03-13 RX ADMIN — IOHEXOL 100 ML: 350 INJECTION, SOLUTION INTRAVENOUS at 18:47

## 2021-03-13 NOTE — ED PROVIDER NOTES
History  Chief Complaint   Patient presents with    Flank Pain     pt c/o intermittent right flank pain that radiates to mid lower back  hx kidney infections and stones, denies difficulty urinating  59-year-old female with history of UTIs and prior kidney stones this presents for abdominal/flank pain  Patient says that she initially developed some mild dysuria and foul-smelling urine 4 days ago  Three days ago she developed intermittent right-sided flank pain radiating into the right upper lower abdomen lasting a few minutes per episode, minimal at present  Did not take a meds for pain at home  Denies fever, chills, appetite change, cough, chest pain, SOB, n/v/d, vaginal symptoms, headache, any other complaints  Is postmenopausal   Abdominal surgeries include tubal ligation  Prior to Admission Medications   Prescriptions Last Dose Informant Patient Reported? Taking?    albuterol (PROVENTIL HFA,VENTOLIN HFA) 90 mcg/act inhaler   No Yes   Sig: USE 2 PUFFS EVERY 4 HOURS AS NEEDED FOR WHEEZING OR SHORTNESS OF BREATH   atorvastatin (LIPITOR) 40 mg tablet  Self No Yes   Sig: TAKE 1 TABLET BY MOUTH EVERY DAY   benzonatate (TESSALON PERLES) 100 mg capsule   No Yes   Sig: Take 1 capsule (100 mg total) by mouth 3 (three) times a day as needed for cough   escitalopram (LEXAPRO) 10 mg tablet   No Yes   Sig: TAKE 1 TABLET BY MOUTH EVERY DAY   fluticasone (FLONASE) 50 mcg/act nasal spray  Self No Yes   Si spray into each nostril daily   hydrochlorothiazide (HYDRODIURIL) 25 mg tablet  Self No Yes   Sig: TAKE 1 TABLET BY MOUTH EVERY DAY   neomycin-polymyxin-hydrocortisone (CORTISPORIN) 0 35%-10,000 units/mL-1% otic suspension  Self Yes Yes   Sig: INSTILL 4 DROPS INTO AFFECTED EARS 4 TIMES A DAY FOR 5 TO 7 DAYS   pantoprazole (PROTONIX) 40 mg tablet   No No   Sig: Take 1 tablet (40 mg total) by mouth daily   varenicline (CHANTIX RAMESH) 0 5 MG X 11 & 1 MG X 42 tablet   No Yes   Sig: Take one 0 5 mg tablet by mouth once daily for 3 days, then one 0 5 mg tablet by mouth twice daily for 4 days, then one 1 mg tablet by mouth twice daily  Facility-Administered Medications: None       Past Medical History:   Diagnosis Date    Depression 11/12/2019    Discharge from right nipple     last assessed 03/20/2013    GERD (gastroesophageal reflux disease) 3/3/2020    Hyperlipidemia     Hypertension     Loose stools 1/30/2018    Subclinical hypothyroidism 4/26/2019       Past Surgical History:   Procedure Laterality Date    CARPAL TUNNEL RELEASE      CO HYSTEROSCOPY,W/ENDO BX N/A 11/20/2018    Procedure: DILATATION AND CURETTAGE (D&C) WITH HYSTEROSCOPY;  Surgeon: Hiram Gallo DO;  Location: [facility];  Service: Gynecology    TUBAL LIGATION      WRIST SURGERY Bilateral        Family History   Problem Relation Age of Onset    Heart attack Mother     Diabetes Father     Hypertension Father     Hyperlipidemia Father     Breast cancer Maternal Grandmother 61    Asthma Sister     Fibromyalgia Sister     No Known Problems Brother     Depression Daughter     No Known Problems Maternal Grandfather     No Known Problems Sister     No Known Problems Sister     No Known Problems Brother     Mental retardation Brother     Cerebral palsy Brother     ADD / ADHD Daughter     Bipolar disorder Daughter      I have reviewed and agree with the history as documented  E-Cigarette/Vaping    E-Cigarette Use Never User      E-Cigarette/Vaping Substances    Nicotine No     THC No     CBD No     Flavoring No     Other No     Unknown No      Social History     Tobacco Use    Smoking status: Former Smoker    Smokeless tobacco: Former User    Tobacco comment: 2pk day, quit 2/2018   Substance Use Topics    Alcohol use: No    Drug use: No        Review of Systems   Constitutional: Negative  Negative for chills and fever  HENT: Negative  Negative for rhinorrhea  Eyes: Negative  Respiratory: Negative    Negative for cough and shortness of breath  Cardiovascular: Negative  Negative for chest pain and leg swelling  Gastrointestinal: Positive for abdominal pain  Negative for diarrhea, nausea and vomiting  Genitourinary: Positive for dysuria and flank pain  Negative for frequency  Musculoskeletal: Negative for back pain and neck pain  Skin: Negative  Negative for rash  Neurological: Negative  Negative for light-headedness and headaches  All other systems reviewed and are negative  Physical Exam  ED Triage Vitals [03/13/21 1729]   Temperature Pulse Respirations Blood Pressure SpO2   98 °F (36 7 °C) 97 18 160/73 98 %      Temp Source Heart Rate Source Patient Position - Orthostatic VS BP Location FiO2 (%)   Oral Monitor Sitting Right arm --      Pain Score       4             Orthostatic Vital Signs  Vitals:    03/13/21 1729   BP: 160/73   Pulse: 97   Patient Position - Orthostatic VS: Sitting       Physical Exam  Vitals signs and nursing note reviewed  Constitutional:       General: She is not in acute distress  Appearance: She is well-developed  HENT:      Head: Normocephalic and atraumatic  Mouth/Throat:      Mouth: Mucous membranes are moist    Eyes:      Pupils: Pupils are equal, round, and reactive to light  Neck:      Musculoskeletal: Normal range of motion and neck supple  Cardiovascular:      Rate and Rhythm: Normal rate and regular rhythm  Pulses:           Radial pulses are 2+ on the right side and 2+ on the left side  Heart sounds: Normal heart sounds  No murmur  No friction rub  No gallop  Pulmonary:      Effort: Pulmonary effort is normal  No respiratory distress  Breath sounds: Normal breath sounds  No stridor  No wheezing, rhonchi or rales  Abdominal:      Palpations: Abdomen is soft  Tenderness: There is abdominal tenderness (Minimal) in the right upper quadrant and right lower quadrant  There is right CVA tenderness   There is no left CVA tenderness, guarding or rebound  Musculoskeletal: Normal range of motion  General: No swelling or tenderness  Right lower leg: No edema  Left lower leg: No edema  Skin:     General: Skin is warm and dry  Capillary Refill: Capillary refill takes less than 2 seconds  Neurological:      Mental Status: She is alert and oriented to person, place, and time  Cranial Nerves: No cranial nerve deficit  Comments: Clear fluent speech         ED Medications  Medications   lidocaine (LIDODERM) 5 % patch 1 patch (1 patch Topical Medication Applied 3/13/21 1933)   ketorolac (TORADOL) injection 15 mg (15 mg Intravenous Given 3/13/21 1805)   iohexol (OMNIPAQUE) 350 MG/ML injection (MULTI-DOSE) 100 mL (100 mL Intravenous Given 3/13/21 1847)       Diagnostic Studies  Results Reviewed     Procedure Component Value Units Date/Time    Urine Microscopic [[id number]] Collected: 03/13/21 1810    Lab Status:  In process Specimen: Urine, Clean Catch Updated: 03/13/21 1853    POCT urinalysis dipstick [[id number]]  (Normal) Resulted: 03/13/21 1850    Lab Status: Final result Updated: 03/13/21 1850     Color, UA yellow     Clarity, UA clear    CMP [[id number]]  (Abnormal) Collected: 03/13/21 1805    Lab Status: Final result Specimen: Blood from Arm, Left Updated: 03/13/21 1836     Sodium 138 mmol/L      Potassium 3 3 mmol/L      Chloride 101 mmol/L      CO2 32 mmol/L      ANION GAP 5 mmol/L      BUN 16 mg/dL      Creatinine 0 79 mg/dL      Glucose 142 mg/dL      Calcium 8 9 mg/dL      AST 14 U/L      ALT 23 U/L      Alkaline Phosphatase 143 U/L      Total Protein 7 6 g/dL      Albumin 3 9 g/dL      Total Bilirubin 0 24 mg/dL      eGFR 87 ml/min/1 73sq m     Narrative:      Christa guidelines for Chronic Kidney Disease (CKD):     Stage 1 with normal or high GFR (GFR > 90 mL/min/1 73 square meters)    Stage 2 Mild CKD (GFR = 60-89 mL/min/1 73 square meters)    Stage 3A Moderate CKD (GFR = 45-59 mL/min/1 73 square meters)    Stage 3B Moderate CKD (GFR = 30-44 mL/min/1 73 square meters)    Stage 4 Severe CKD (GFR = 15-29 mL/min/1 73 square meters)    Stage 5 End Stage CKD (GFR <15 mL/min/1 73 square meters)  Note: GFR calculation is accurate only with a steady state creatinine    Lipase [[id number]]  (Normal) Collected: 03/13/21 1805    Lab Status: Final result Specimen: Blood from Arm, Left Updated: 03/13/21 1836     Lipase 92 u/L     CBC and differential [[id number]]  (Abnormal) Collected: 03/13/21 1805    Lab Status: Final result Specimen: Blood from Arm, Left Updated: 03/13/21 1813     WBC 11 67 Thousand/uL      RBC 4 42 Million/uL      Hemoglobin 13 1 g/dL      Hematocrit 39 5 %      MCV 89 fL      MCH 29 6 pg      MCHC 33 2 g/dL      RDW 13 3 %      MPV 9 3 fL      Platelets 513 Thousands/uL      nRBC 0 /100 WBCs      Neutrophils Relative 63 %      Immat GRANS % 0 %      Lymphocytes Relative 27 %      Monocytes Relative 7 %      Eosinophils Relative 3 %      Basophils Relative 0 %      Neutrophils Absolute 7 26 Thousands/µL      Immature Grans Absolute 0 04 Thousand/uL      Lymphocytes Absolute 3 20 Thousands/µL      Monocytes Absolute 0 80 Thousand/µL      Eosinophils Absolute 0 33 Thousand/µL      Basophils Absolute 0 04 Thousands/µL     Urine Macroscopic, POC [[id number]]  (Abnormal) Collected: 03/13/21 1810    Lab Status: Final result Specimen: Urine Updated: 03/13/21 1812     Color, UA Yellow     Clarity, UA Clear     pH, UA 7 0     Leukocytes, UA Trace     Nitrite, UA Negative     Protein, UA Negative mg/dl      Glucose, UA Negative mg/dl      Ketones, UA Negative mg/dl      Urobilinogen, UA 0 2 E U /dl      Bilirubin, UA Negative     Blood, UA Trace     Specific Preston, UA 1 025    Narrative:      [facility] RESULT                 CT abdomen pelvis with contrast   Final Result by Abelino Mancuso MD (03/13 1911)   No acute findings in the abdomen or pelvis     Workstation performed: [id number]            Procedures  Procedures      ED Course                                       [facility]  Number of Diagnoses or Management Options  Right flank pain:   Diagnosis management comments: 51-year-old female with history of UTIs and prior kidney stones this presents for abdominal/flank pain  Within differential consider pyelonephritis, kidney stone, gallbladder pathology, appendicitis  Will obtain abdominal labs, urine, CT to evaluate  Final assessment: workup reassuring  Patient feels improved on re-exam  May be musculoskeletal - will send home with lidoderm patch and advise NSAIDS/Tylenol  Strict ED return precautions provided should symptoms worsen and patient can otherwise follow up outpatient  Patient expresses an understanding and agreement with the plan and remains in good condition for discharge  Disposition  Final diagnoses:   Right flank pain     Time reflects when diagnosis was documented in both MDM as applicable and the Disposition within this note     Time User Action Codes Description Comment    3/13/2021  7:25 PM Minor, Janice Add [R10 9] Flank pain     3/13/2021  7:25 PM Minor, Janice Remove [R10 9] Flank pain     3/13/2021  7:25 PM Minor, Janice Add [R10 9] Right flank pain       ED Disposition     ED Disposition Condition Date/Time Comment    Discharge Good Sat Mar 13, 2021  7:25 PM Tank  discharge to home/self care              Follow-up Information     Follow up With Specialties Details Why Contact Info Additional Information    Tohono O'odham MD [facility] Call in 1 day  [facility] [phone]       [address] [place] [address] Emergency Department Emergency Medicine Go to  If symptoms worsen [address]  [address] [place] [address] Emergency Department, [address], [address] [phone]          Patient's Medications   Discharge Prescriptions    No medications on file     No discharge procedures on file  PDMP Review     None           ED Provider  Attending physically available and evaluated Audrey Grandeabhi  I managed the patient along with the ED Attending      Electronically Signed by         Jannette Akbar MD  03/13/21 9498

## 2021-03-13 NOTE — ED ATTENDING ATTESTATION
3/13/2021  IJose C MD, saw and evaluated the patient  I have discussed the patient with the resident/non-physician practitioner and agree with the resident's/non-physician practitioner's findings, Plan of Care, and MDM as documented in the resident's/non-physician practitioner's note, except where noted  All available labs and Radiology studies were reviewed  I was present for key portions of any procedure(s) performed by the resident/non-physician practitioner and I was immediately available to provide assistance  At this point I agree with the current assessment done in the Emergency Department  I have conducted an independent evaluation of this patient a history and physical is as follows:    ED Course         Critical Care Time  Procedures    45 yo female with hx of uti, kidney stones, started with right flank pain and dysuria for four days  Pt symptoms are waxing and waning  Pt did not take pain meds at home  Pt usually passes stone spont  Pt with no n/v/d, no fever  psh tubal ligation  Vss, afebrile, lungs cta, rrr, abdomen soft tender ruq, rlq, right cva tenderness  Labs, urine, ct a/p, pain meds  Likely pyelo, rule out stone

## 2021-03-14 RX ORDER — ESCITALOPRAM OXALATE 10 MG/1
TABLET ORAL
Qty: 30 TABLET | Refills: 1 | Status: SHIPPED | OUTPATIENT
Start: 2021-03-14 | End: 2021-04-07

## 2021-03-22 DIAGNOSIS — E78.5 DYSLIPIDEMIA: ICD-10-CM

## 2021-03-22 RX ORDER — ATORVASTATIN CALCIUM 40 MG/1
TABLET, FILM COATED ORAL
Qty: 60 TABLET | Refills: 2 | Status: SHIPPED | OUTPATIENT
Start: 2021-03-22 | End: 2021-12-12

## 2021-03-26 DIAGNOSIS — Z72.0 TOBACCO ABUSE: ICD-10-CM

## 2021-03-26 RX ORDER — VARENICLINE TARTRATE 1 MG/1
1 TABLET, FILM COATED ORAL 2 TIMES DAILY
Qty: 60 TABLET | Refills: 1 | Status: SHIPPED | OUTPATIENT
Start: 2021-03-26 | End: 2021-04-18

## 2021-03-30 ENCOUNTER — TELEPHONE (OUTPATIENT)
Dept: FAMILY MEDICINE CLINIC | Facility: CLINIC | Age: 51
End: 2021-03-30

## 2021-03-30 NOTE — TELEPHONE ENCOUNTER
Pharmacy calling, patient currently receiving Albuterol HFA  Ins recommending she have another asthma controller inhaler prescribed  Please review

## 2021-04-07 DIAGNOSIS — F41.1 GENERALIZED ANXIETY DISORDER: ICD-10-CM

## 2021-04-07 RX ORDER — ESCITALOPRAM OXALATE 10 MG/1
TABLET ORAL
Qty: 30 TABLET | Refills: 1 | Status: SHIPPED | OUTPATIENT
Start: 2021-04-07 | End: 2021-05-05

## 2021-04-17 DIAGNOSIS — Z72.0 TOBACCO ABUSE: ICD-10-CM

## 2021-04-18 RX ORDER — VARENICLINE TARTRATE 1 MG/1
TABLET, FILM COATED ORAL
Qty: 56 TABLET | Refills: 1 | Status: SHIPPED | OUTPATIENT
Start: 2021-04-18

## 2021-04-20 DIAGNOSIS — I10 ESSENTIAL HYPERTENSION: ICD-10-CM

## 2021-04-20 RX ORDER — HYDROCHLOROTHIAZIDE 25 MG/1
TABLET ORAL
Qty: 30 TABLET | Refills: 5 | OUTPATIENT
Start: 2021-04-20

## 2021-05-05 DIAGNOSIS — F41.1 GENERALIZED ANXIETY DISORDER: ICD-10-CM

## 2021-05-05 RX ORDER — ESCITALOPRAM OXALATE 10 MG/1
TABLET ORAL
Qty: 30 TABLET | Refills: 1 | Status: SHIPPED | OUTPATIENT
Start: 2021-05-05 | End: 2021-06-07 | Stop reason: SDUPTHER

## 2021-05-10 DIAGNOSIS — I10 ESSENTIAL HYPERTENSION: ICD-10-CM

## 2021-05-11 RX ORDER — HYDROCHLOROTHIAZIDE 25 MG/1
TABLET ORAL
Qty: 30 TABLET | Refills: 5 | Status: SHIPPED | OUTPATIENT
Start: 2021-05-11 | End: 2021-11-01

## 2021-06-01 ENCOUNTER — OFFICE VISIT (OUTPATIENT)
Dept: FAMILY MEDICINE CLINIC | Facility: CLINIC | Age: 51
End: 2021-06-01

## 2021-06-01 VITALS
SYSTOLIC BLOOD PRESSURE: 138 MMHG | HEART RATE: 105 BPM | RESPIRATION RATE: 18 BRPM | DIASTOLIC BLOOD PRESSURE: 82 MMHG | OXYGEN SATURATION: 98 % | HEIGHT: 63 IN | BODY MASS INDEX: 39.51 KG/M2 | TEMPERATURE: 98.3 F | WEIGHT: 223 LBS

## 2021-06-01 DIAGNOSIS — H60.501 ACUTE OTITIS EXTERNA OF RIGHT EAR, UNSPECIFIED TYPE: Primary | ICD-10-CM

## 2021-06-01 PROCEDURE — 3008F BODY MASS INDEX DOCD: CPT | Performed by: FAMILY MEDICINE

## 2021-06-01 PROCEDURE — 99213 OFFICE O/P EST LOW 20 MIN: CPT | Performed by: FAMILY MEDICINE

## 2021-06-01 PROCEDURE — 1036F TOBACCO NON-USER: CPT | Performed by: FAMILY MEDICINE

## 2021-06-01 RX ORDER — CIPROFLOXACIN AND DEXAMETHASONE 3; 1 MG/ML; MG/ML
4 SUSPENSION/ DROPS AURICULAR (OTIC) 2 TIMES DAILY
Qty: 7.5 ML | Refills: 0 | Status: SHIPPED | OUTPATIENT
Start: 2021-06-01 | End: 2021-08-24

## 2021-06-01 NOTE — LETTER
June 1, 2021     Patient: Calixto Underwood   YOB: 1970   Date of Visit: 6/1/2021       To Whom it May Concern:    Calixto Underwood is under my professional care  She was seen in my office on 6/1/2021  She may return to work on 6/7/21  If you have any questions or concerns, please don't hesitate to call           Sincerely,          Dionisio Fleming MD

## 2021-06-01 NOTE — ASSESSMENT & PLAN NOTE
Acute, new-onset  Will prescribe ciprodex   Advised on warning signs and symptoms along with ER precautions  RTC if symptoms worsen or do not resolve

## 2021-06-07 DIAGNOSIS — F41.1 GENERALIZED ANXIETY DISORDER: ICD-10-CM

## 2021-06-07 RX ORDER — ESCITALOPRAM OXALATE 10 MG/1
TABLET ORAL
Qty: 30 TABLET | Refills: 1 | Status: SHIPPED | OUTPATIENT
Start: 2021-06-07 | End: 2021-07-06

## 2021-06-29 ENCOUNTER — TELEPHONE (OUTPATIENT)
Dept: FAMILY MEDICINE CLINIC | Facility: CLINIC | Age: 51
End: 2021-06-29

## 2021-07-06 DIAGNOSIS — F41.1 GENERALIZED ANXIETY DISORDER: ICD-10-CM

## 2021-07-06 RX ORDER — ESCITALOPRAM OXALATE 10 MG/1
TABLET ORAL
Qty: 30 TABLET | Refills: 1 | Status: SHIPPED | OUTPATIENT
Start: 2021-07-06 | End: 2021-07-29

## 2021-07-29 DIAGNOSIS — F41.1 GENERALIZED ANXIETY DISORDER: ICD-10-CM

## 2021-07-29 RX ORDER — ESCITALOPRAM OXALATE 10 MG/1
TABLET ORAL
Qty: 30 TABLET | Refills: 1 | Status: SHIPPED | OUTPATIENT
Start: 2021-07-29 | End: 2021-08-27 | Stop reason: SDUPTHER

## 2021-08-24 ENCOUNTER — OFFICE VISIT (OUTPATIENT)
Dept: FAMILY MEDICINE CLINIC | Facility: CLINIC | Age: 51
End: 2021-08-24

## 2021-08-24 VITALS
TEMPERATURE: 97.6 F | HEIGHT: 63 IN | RESPIRATION RATE: 18 BRPM | BODY MASS INDEX: 36.79 KG/M2 | OXYGEN SATURATION: 96 % | SYSTOLIC BLOOD PRESSURE: 126 MMHG | DIASTOLIC BLOOD PRESSURE: 74 MMHG | HEART RATE: 88 BPM | WEIGHT: 207.6 LBS

## 2021-08-24 DIAGNOSIS — H69.82 DYSFUNCTION OF LEFT EUSTACHIAN TUBE: ICD-10-CM

## 2021-08-24 DIAGNOSIS — H93.8X2 EAR PRESSURE, LEFT: Primary | ICD-10-CM

## 2021-08-24 DIAGNOSIS — J30.9 ALLERGIC RHINITIS, UNSPECIFIED SEASONALITY, UNSPECIFIED TRIGGER: ICD-10-CM

## 2021-08-24 PROCEDURE — 99214 OFFICE O/P EST MOD 30 MIN: CPT | Performed by: FAMILY MEDICINE

## 2021-08-24 PROCEDURE — 1036F TOBACCO NON-USER: CPT | Performed by: FAMILY MEDICINE

## 2021-08-24 PROCEDURE — 3008F BODY MASS INDEX DOCD: CPT | Performed by: FAMILY MEDICINE

## 2021-08-24 RX ORDER — LORATADINE 10 MG/1
10 TABLET ORAL DAILY
Qty: 30 TABLET | Refills: 0 | Status: SHIPPED | OUTPATIENT
Start: 2021-08-24 | End: 2021-09-16 | Stop reason: SDUPTHER

## 2021-08-24 NOTE — ASSESSMENT & PLAN NOTE
- Recommended to take Claritin 10mg daily to help with allergies  - If symptoms do not improve in the next two weeks recommended to make an appointment with ENT to evaluate the pressure in her left ear

## 2021-08-24 NOTE — PROGRESS NOTES
Assessment/Plan:     Seasonal allergies  - Recommended to take Claritin 10mg daily to help with allergies  - If symptoms do not improve in the next two weeks recommended to make an appointment with ENT to evaluate the pressure in her left ear  Ear pressure, left  Chronic, worsened over last 2 weeks  - Went to Urgent Care clinic who gave 10 days of Amoxicillin and dx of AOM  - Patient has history of chronic allergies with postnasal drip and tympanostomy tubes  - Pt denies relief with abx; no pain, pressure and unable to "pop" ears  - Most likely eustachian tube dysfunction; exam unremarkable  - Will prescribe Claritin for allergy relief, Netti pot to clear nasal congestion nightly, and continue Flonase BID  - Discussed to make apt with ENT if no relief within 2 weeks       Diagnoses and all orders for this visit:    Ear pressure, left  -     loratadine (CLARITIN) 10 mg tablet; Take 1 tablet (10 mg total) by mouth daily  -     Ambulatory Referral to Otolaryngology; Future    Allergic rhinitis, unspecified seasonality, unspecified trigger  -     loratadine (CLARITIN) 10 mg tablet; Take 1 tablet (10 mg total) by mouth daily    Dysfunction of left eustachian tube  -     loratadine (CLARITIN) 10 mg tablet; Take 1 tablet (10 mg total) by mouth daily  -     Ambulatory Referral to Otolaryngology; Future        Subjective:      Patient ID: Jasmine Butterfield is a 46 y o  female  Earache   There is pain in the left ear  This is a recurrent (Pt  reports that she has chronic ear infections and her last ear infection was last year) problem  The current episode started 1 to 4 weeks ago  The problem occurs constantly  The problem has been unchanged  There has been no fever  The pain is at a severity of 5/10  The patient is experiencing no pain (but states that her ear is very itchy)  Associated symptoms include coughing   Pertinent negatives include no abdominal pain, diarrhea, ear discharge, headaches, hearing loss, rhinorrhea, sore throat or vomiting  She has tried antibiotics for the symptoms  The treatment provided no relief  Pt  Was prescribed amoxicillin at urgent care and she finished her 10 day course two days ago  She states that the antibiotics did not help with her current symptoms  Pt  Endorses seasonal allergies and believes that her ear pain is linked to her allergies  The following portions of the patient's history were reviewed and updated as appropriate: allergies, current medications and past medical history  Review of Systems   Constitutional: Negative for chills, fatigue and fever  HENT: Positive for ear pain, postnasal drip, sinus pressure and sinus pain  Negative for ear discharge, hearing loss, rhinorrhea and sore throat  Respiratory: Positive for cough  Gastrointestinal: Negative for abdominal pain, diarrhea, nausea and vomiting  Neurological: Negative for headaches  Psychiatric/Behavioral: Negative for sleep disturbance  Objective:    /74 (BP Location: Left arm, Patient Position: Sitting, Cuff Size: Large)   Pulse 88   Temp 97 6 °F (36 4 °C) (Temporal)   Resp 18   Ht 5' 3" (1 6 m)   Wt 94 2 kg (207 lb 9 6 oz)   LMP 01/01/2016   SpO2 96%   BMI 36 77 kg/m²        Physical Exam  Constitutional:       Appearance: Normal appearance  HENT:      Head: Normocephalic  Right Ear: Hearing, tympanic membrane, ear canal and external ear normal  No decreased hearing noted  No drainage, swelling or tenderness  Tympanic membrane is not erythematous, retracted or bulging  Left Ear: Hearing, tympanic membrane, ear canal and external ear normal  No decreased hearing noted  No swelling or tenderness  There is no impacted cerumen  Tympanic membrane is not erythematous, retracted or bulging  Ears:      Comments: Mild pruritis in left ear canal      Nose: Congestion present  No rhinorrhea        Mouth/Throat:      Mouth: Mucous membranes are moist       Pharynx: Oropharynx is clear  No posterior oropharyngeal erythema  Eyes:      Pupils: Pupils are equal, round, and reactive to light  Cardiovascular:      Rate and Rhythm: Normal rate and regular rhythm  Pulses: Normal pulses  Heart sounds: Normal heart sounds  Pulmonary:      Effort: Pulmonary effort is normal       Breath sounds: Normal breath sounds  Musculoskeletal:      Cervical back: Normal range of motion and neck supple  Skin:     General: Skin is warm and dry  Neurological:      General: No focal deficit present  Mental Status: She is alert

## 2021-08-24 NOTE — ASSESSMENT & PLAN NOTE
Chronic, worsened over last 2 weeks  - Went to Urgent Care clinic who gave 10 days of Amoxicillin and dx of AOM  - Patient has history of chronic allergies with postnasal drip and tympanostomy tubes  - Pt denies relief with abx; no pain, pressure and unable to "pop" ears  - Most likely eustachian tube dysfunction; exam unremarkable  - Will prescribe Claritin for allergy relief, Netti pot to clear nasal congestion nightly, and continue Flonase BID  - Discussed to make apt with ENT if no relief within 2 weeks

## 2021-08-24 NOTE — PATIENT INSTRUCTIONS
Eustachian Tube Dysfunction   WHAT YOU NEED TO KNOW:   What is eustachian tube dysfunction (ETD)? ETD is a condition that prevents your eustachian tubes from opening properly  It can also cause them to become blocked  Eustachian tubes connect your middle ear to the back of your nose and throat  These tubes open and allow air to flow in and out when you sneeze, swallow, or yawn  What causes or increases my risk for ETD? ETD may be caused by swelling or buildup of mucus in your eustachian tubes  Pressure can build if you travel in an airplane or go scuba diving  Allergies, a cold, or a sinus infection can cause mucus to build up  The following can also increase your risk:  · Smoking cigarettes    · GERD, chronic sinus inflammation, or a tumor in your nose or throat    · An immune system disorder    · Sleeping on your stomach    · In children, long-term use of a bottle, going to , or a condition such as a cleft palate    What are the signs and symptoms of ETD? · Fullness or pressure in your ears    · Muffled hearing, or a feeling you are hearing under water or have clogged ears    · Pain in one or both ears    · Ringing in your ears    · Popping, crackling, or clicking feeling in your ears    · Trouble keeping your balance    How is ETD diagnosed? ETD is most common in children younger than 5 years  Adults with ETD may have had it since childhood  ETD can sometimes begin in adulthood, usually because of certain medical conditions that have developed  Your healthcare provider will ask about your symptoms and when they began  He or she will examine your ears, your nose, and the back of your throat  He or she may also do a hearing test   How is ETD treated? ETD may get better on its own without any treatment  If it continues, you may need any of the following:  · Swallow, yawn, or chew gum  to help open your eustachian tubes   Your healthcare provider may also recommend you blow with your mouth shut and your nostrils pinched closed  · Air pressure devices  push air into your nose and eustachian tubes to help relieve air pressure in your ear  · Treatment for allergies  such as decongestants, antihistamines, and nasal steroids may improve ETD  They may help decrease swelling of the eustachian tubes  · A myringotomy  is surgery to make a hole in your eardrum  The hole relieves pressure and lets fluid drain from your ear  A pressure equalizing (PE) tube may be used to keep the hole open and to help drain fluid  · Tuboplasty  is a procedure to widen your eustachian tubes  When should I call my doctor? · Your symptoms do not improve or get worse  · You have a fever  · You have any hearing loss  · You have questions or concerns about your condition or care  CARE AGREEMENT:   You have the right to help plan your care  Learn about your health condition and how it may be treated  Discuss treatment options with your healthcare providers to decide what care you want to receive  You always have the right to refuse treatment  The above information is an  only  It is not intended as medical advice for individual conditions or treatments  Talk to your doctor, nurse or pharmacist before following any medical regimen to see if it is safe and effective for you  © Copyright [employer] 2021 Information is for End User's use only and may not be sold, redistributed or otherwise used for commercial purposes   All illustrations and images included in CareNotes® are the copyrighted property of [employer]  or [address] [place] [address] [employer]

## 2021-08-27 DIAGNOSIS — F41.1 GENERALIZED ANXIETY DISORDER: ICD-10-CM

## 2021-08-27 RX ORDER — ESCITALOPRAM OXALATE 10 MG/1
TABLET ORAL
Qty: 30 TABLET | Refills: 1 | Status: SHIPPED | OUTPATIENT
Start: 2021-08-27 | End: 2021-09-23

## 2021-09-07 ENCOUNTER — OFFICE VISIT (OUTPATIENT)
Dept: FAMILY MEDICINE CLINIC | Facility: CLINIC | Age: 51
End: 2021-09-07

## 2021-09-07 VITALS
DIASTOLIC BLOOD PRESSURE: 90 MMHG | HEART RATE: 78 BPM | SYSTOLIC BLOOD PRESSURE: 140 MMHG | WEIGHT: 202.8 LBS | HEIGHT: 63 IN | RESPIRATION RATE: 16 BRPM | TEMPERATURE: 97.7 F | BODY MASS INDEX: 35.93 KG/M2

## 2021-09-07 DIAGNOSIS — Z13.6 SCREENING FOR CARDIOVASCULAR CONDITION: ICD-10-CM

## 2021-09-07 DIAGNOSIS — E78.5 DYSLIPIDEMIA: ICD-10-CM

## 2021-09-07 DIAGNOSIS — J30.2 SEASONAL ALLERGIES: ICD-10-CM

## 2021-09-07 DIAGNOSIS — H69.82 EUSTACHIAN TUBE DYSFUNCTION, LEFT: ICD-10-CM

## 2021-09-07 DIAGNOSIS — H93.8X2 EAR PRESSURE, LEFT: ICD-10-CM

## 2021-09-07 DIAGNOSIS — Z13.1 SCREENING FOR DIABETES MELLITUS: ICD-10-CM

## 2021-09-07 DIAGNOSIS — L29.9 EAR ITCHING: Primary | ICD-10-CM

## 2021-09-07 PROCEDURE — 3725F SCREEN DEPRESSION PERFORMED: CPT | Performed by: FAMILY MEDICINE

## 2021-09-07 PROCEDURE — 99213 OFFICE O/P EST LOW 20 MIN: CPT | Performed by: FAMILY MEDICINE

## 2021-09-07 PROCEDURE — 3008F BODY MASS INDEX DOCD: CPT | Performed by: FAMILY MEDICINE

## 2021-09-07 RX ORDER — HYDROCORTISONE AND ACETIC ACID 20.75; 10.375 MG/ML; MG/ML
3 SOLUTION AURICULAR (OTIC) EVERY 6 HOURS SCHEDULED
Qty: 10 ML | Refills: 0 | Status: SHIPPED | OUTPATIENT
Start: 2021-09-07

## 2021-09-07 NOTE — ASSESSMENT & PLAN NOTE
- Recommended to continue using the nasal wash  Decrease Flonase use from two times daily to once in the morning to help decrease rhinorrhea  - Continue taking Claritin 10mg to help manage seasonal allergies

## 2021-09-07 NOTE — ASSESSMENT & PLAN NOTE
- Hydrocortisone-acetic acid (VOSOL-HC) otic solution; Administer 3 drops into the left ear every 6 (six) hours

## 2021-09-07 NOTE — PROGRESS NOTES
Assessment/Plan:    Dyslipidemia  - Will order a lipid panel to review her cholesterol levels at her annual visit  Prediabetes  - Hemoglobin A1C ordered to evaluate her prediabetes  Seasonal allergies  - Recommended to continue using the nasal wash  Decrease Flonase use from two times daily to once in the morning to help decrease rhinorrhea  - Continue taking Claritin 10mg to help manage seasonal allergies  Ear itching  - Hydrocortisone-acetic acid (VOSOL-HC) otic solution; Administer 3 drops into the left ear every 6 (six) hours       Diagnoses and all orders for this visit:    Ear itching  -     hydrocortisone-acetic acid (VOSOL-HC) otic solution; Administer 3 drops into the left ear every 6 (six) hours    Eustachian tube dysfunction, left    Ear pressure, left    Seasonal allergies    Screening for cardiovascular condition  -     Lipid panel; Future    Screening for diabetes mellitus  -     Hemoglobin A1C; Future    Dyslipidemia  -     Lipid panel; Future          Subjective:      Patient ID: Calvin Florian is a 46 y o  female  Calvin Florian is a 46year old female who presents today for a follow up for left ear "popping" and itchiness for the past month  Pt  Was last seen in the office on 8/24  It was recommended that she start taking Claritin 10mg daily, use a nasal wash, and use Flonase 2 times daily to help with allergies  Pt  Was compliant with the recommended treatments and she reports that her sinus congestion has improved significantly but she is now experiencing rhinorrhea  She states that her left ear is still itchy but she denies any pain  She is also still experiencing the "popping" sensation in her left ear  She denies any changes in hearing  Pt  Has not made an appointment with ENT yet        The following portions of the patient's history were reviewed and updated as appropriate: allergies, current medications and problem list     Review of Systems   Constitutional: Negative for chills, fatigue and fever  HENT: Positive for rhinorrhea  Negative for congestion, ear discharge, ear pain, hearing loss, postnasal drip, sinus pressure, sinus pain and sore throat  Eyes: Negative for itching  Respiratory: Negative for chest tightness and shortness of breath  Allergic/Immunologic: Positive for environmental allergies  Neurological: Negative for dizziness and headaches  Objective:      /90 (BP Location: Left arm, Patient Position: Sitting, Cuff Size: Large)   Pulse 78   Temp 97 7 °F (36 5 °C) (Temporal)   Resp 16   Ht 5' 3" (1 6 m)   Wt 92 kg (202 lb 12 8 oz)   LMP 01/01/2016   BMI 35 92 kg/m²          Physical Exam  Constitutional:       Appearance: Normal appearance  HENT:      Head: Normocephalic and atraumatic  Right Ear: Tympanic membrane, ear canal and external ear normal       Left Ear: Tympanic membrane and external ear normal       Ears:      Comments: Pts  Left ear canal is mildly injected  Nose: Rhinorrhea present  Mouth/Throat:      Mouth: Mucous membranes are moist       Pharynx: Oropharyngeal exudate present  No posterior oropharyngeal erythema  Eyes:      Conjunctiva/sclera: Conjunctivae normal       Pupils: Pupils are equal, round, and reactive to light  Cardiovascular:      Rate and Rhythm: Normal rate and regular rhythm  Pulses: Normal pulses  Heart sounds: Normal heart sounds  Pulmonary:      Effort: Pulmonary effort is normal       Breath sounds: Normal breath sounds  Neurological:      Mental Status: She is alert

## 2021-09-16 DIAGNOSIS — H93.8X2 EAR PRESSURE, LEFT: ICD-10-CM

## 2021-09-16 DIAGNOSIS — J30.9 ALLERGIC RHINITIS, UNSPECIFIED SEASONALITY, UNSPECIFIED TRIGGER: ICD-10-CM

## 2021-09-16 DIAGNOSIS — H69.82 DYSFUNCTION OF LEFT EUSTACHIAN TUBE: ICD-10-CM

## 2021-09-16 RX ORDER — LORATADINE 10 MG/1
10 TABLET ORAL DAILY
Qty: 30 TABLET | Refills: 5 | Status: SHIPPED | OUTPATIENT
Start: 2021-09-16 | End: 2022-02-01

## 2021-09-21 ENCOUNTER — APPOINTMENT (OUTPATIENT)
Dept: LAB | Facility: HOSPITAL | Age: 51
End: 2021-09-21
Payer: COMMERCIAL

## 2021-09-21 DIAGNOSIS — Z13.1 SCREENING FOR DIABETES MELLITUS: ICD-10-CM

## 2021-09-21 DIAGNOSIS — Z13.6 SCREENING FOR CARDIOVASCULAR CONDITION: ICD-10-CM

## 2021-09-21 DIAGNOSIS — E78.5 DYSLIPIDEMIA: ICD-10-CM

## 2021-09-21 LAB
CHOLEST SERPL-MCNC: 241 MG/DL (ref 50–200)
EST. AVERAGE GLUCOSE BLD GHB EST-MCNC: 131 MG/DL
HBA1C MFR BLD: 6.2 %
HDLC SERPL-MCNC: 39 MG/DL
LDLC SERPL CALC-MCNC: 183 MG/DL (ref 0–100)
NONHDLC SERPL-MCNC: 202 MG/DL
TRIGL SERPL-MCNC: 96 MG/DL

## 2021-09-21 PROCEDURE — 83036 HEMOGLOBIN GLYCOSYLATED A1C: CPT

## 2021-09-21 PROCEDURE — 80061 LIPID PANEL: CPT

## 2021-09-21 PROCEDURE — 36415 COLL VENOUS BLD VENIPUNCTURE: CPT

## 2021-09-23 DIAGNOSIS — F41.1 GENERALIZED ANXIETY DISORDER: ICD-10-CM

## 2021-09-23 RX ORDER — ESCITALOPRAM OXALATE 10 MG/1
TABLET ORAL
Qty: 30 TABLET | Refills: 1 | Status: SHIPPED | OUTPATIENT
Start: 2021-09-23 | End: 2021-10-19 | Stop reason: SDUPTHER

## 2021-09-27 ENCOUNTER — TELEMEDICINE (OUTPATIENT)
Dept: FAMILY MEDICINE CLINIC | Facility: CLINIC | Age: 51
End: 2021-09-27

## 2021-09-27 DIAGNOSIS — Z20.822 ENCOUNTER BY TELEHEALTH FOR SUSPECTED COVID-19: Primary | ICD-10-CM

## 2021-09-27 PROCEDURE — 1036F TOBACCO NON-USER: CPT | Performed by: FAMILY MEDICINE

## 2021-09-27 PROCEDURE — 99213 OFFICE O/P EST LOW 20 MIN: CPT | Performed by: FAMILY MEDICINE

## 2021-09-27 NOTE — PROGRESS NOTES
COVID-19 Outpatient Progress Note    Assessment/Plan:    Problem List Items Addressed This Visit        Other    Encounter by telehealth for suspected COVID-19 - Primary     Currently asymptomatic  Date of exposure:  9/27  Reports indirect exposure, direct contact is being tested for COVID  Patient requesting COVID test  Educated patient regarding false negative, this early in the course  COVID test ordered per patient's request  Will follow-up with results  Continue quarantine, masking, social distancing  Continue to monitor for symptoms         Relevant Orders    COVID19, Influenza A/B, RSV PCR, [facility] - Collected at [facility] or [facility]         Disposition:     I recommended self-quarantine for 10 days and to watch for symptoms until 14 days after exposure  If patient were to develop symptoms, they should self isolate and call our office for further guidance  I referred patient to one of our centralized sites for a COVID-19 swab  I have spent 20 minutes directly with the patient  Greater than 50% of this time was spent in counseling/coordination of care regarding: instructions for management, patient and family education and importance of treatment compliance  Verification of patient location:    Patient is located in the following state in which I hold an active license [place]    Encounter provider Faiza Echevarria MD    Provider located at [address]   [phone]    Recent Visits  No visits were found meeting these conditions  Showing recent visits within past 7 days and meeting all other requirements  Today's Visits  Date Type Provider Dept   09/27/21 Telemedicine Faiza Echevarria MD  [facility]   Showing today's visits and meeting all other requirements  Future Appointments  No visits were found meeting these conditions    Showing future appointments within next 150 days and meeting all other requirements This virtual check-in was done via telephone and she agrees to proceed  Patient agrees to participate in a virtual check in via telephone or video visit instead of presenting to the office to address urgent/immediate medical needs  Patient is aware this is a billable service  After connecting through Telephone, the patient was identified by name and date of birth  Wilver Moffett was informed that this was a telemedicine visit and that the exam was being conducted confidentially over secure lines  My office door was closed  No one else was in the room  Wilver Moffett acknowledged consent and understanding of privacy and security of the telemedicine visit  I informed the patient that I have reviewed her record in Epic and presented the opportunity for her to ask any questions regarding the visit today  The patient agreed to participate  It was my intent to perform this visit via video technology but the patient was not able to do a video connection so the visit was completed via audio telephone only  Subjective:   Wilver Moffett is a 46 y o  female who is concerned about COVID-19  Patient is currently asymptomatic  Patient denies fever, chills, fatigue, malaise, congestion, rhinorrhea, sore throat, anosmia, loss of taste, cough, shortness of breath, chest tightness, abdominal pain, nausea, vomiting, diarrhea, myalgias and headaches       Date of exposure: 9/27/2021    Exposure:   Contact with a person who is under investigation (PUI) for or who is positive for COVID-19 within the last 14 days?: Yes    Hospitalized recently for fever and/or lower respiratory symptoms?: No      Currently a healthcare worker that is involved in direct patient care?: No      Works in a special setting where the risk of COVID-19 transmission may be high? (this may include long-term care, correctional and retirement facilities; homeless shelters; assisted-living facilities and group homes ): No      Resident in a special setting where the risk of COVID-19 transmission may be high? (this may include long-term care, correctional and [facility] facilities; homeless shelters; assisted-living facilities and group homes ): No      Lab Results   Component Value Date    SARSCOV2 Negative 01/21/2021     Past Medical History:   Diagnosis Date    Depression 11/12/2019    Discharge from right nipple     last assessed 03/20/2013    GERD (gastroesophageal reflux disease) 3/3/2020    Hyperlipidemia     Hypertension     Loose stools 1/30/2018    Subclinical hypothyroidism 4/26/2019     Past Surgical History:   Procedure Laterality Date    CARPAL TUNNEL RELEASE      [place] HYSTEROSCOPY,W/ENDO BX N/A 11/20/2018    Procedure: DILATATION AND CURETTAGE (D&C) WITH HYSTEROSCOPY;  Surgeon: Denny Holm DO;  [facility];  Service: Gynecology    TUBAL LIGATION      WRIST SURGERY Bilateral      Current Outpatient Medications   Medication Sig Dispense Refill    albuterol (PROVENTIL HFA,VENTOLIN HFA) 90 mcg/act inhaler USE 2 PUFFS EVERY 4 HOURS AS NEEDED FOR WHEEZING OR SHORTNESS OF BREATH 8 5 Inhaler 0    atorvastatin (LIPITOR) 40 mg tablet TAKE 1 TABLET BY MOUTH EVERY DAY 60 tablet 2    benzonatate (TESSALON PERLES) 100 mg capsule Take 1 capsule (100 mg total) by mouth 3 (three) times a day as needed for cough (Patient not taking: Reported on 8/24/2021) 20 capsule 0    Chantix 1 MG tablet TAKE 1 TABLET BY MOUTH TWICE A DAY (Patient not taking: Reported on 8/24/2021) 56 tablet 1    escitalopram (LEXAPRO) 10 mg tablet TAKE 1 TABLET BY MOUTH EVERY DAY 30 tablet 1    fluticasone (FLONASE) 50 mcg/act nasal spray 1 spray into each nostril daily 1 Bottle 3    hydrochlorothiazide (HYDRODIURIL) 25 mg tablet TAKE 1 TABLET BY MOUTH EVERY DAY 30 tablet 5    hydrocortisone-acetic acid (VOSOL-HC) otic solution Administer 3 drops into the left ear every 6 (six) hours 10 mL 0    loratadine (CLARITIN) 10 mg tablet Take 1 tablet (10 mg total) by mouth daily 30 tablet 5    pantoprazole (PROTONIX) 40 mg tablet Take 1 tablet (40 mg total) by mouth daily (Patient not taking: Reported on 8/24/2021) 60 tablet 0     No current facility-administered medications for this visit  Allergies   Allergen Reactions    Bee Pollen      Other reaction(s): Allergic Rhinitis, Sneezing    Pollen Extract Allergic Rhinitis and Sneezing       Review of Systems   Constitutional: Negative for chills, fatigue and fever  HENT: Negative for congestion, ear pain, rhinorrhea and sore throat  Eyes: Negative for pain and visual disturbance  Respiratory: Negative for cough, chest tightness and shortness of breath  Cardiovascular: Negative for chest pain and palpitations  Gastrointestinal: Negative for abdominal pain, diarrhea, nausea and vomiting  Genitourinary: Negative for dysuria and hematuria  Musculoskeletal: Negative for arthralgias, back pain and myalgias  Skin: Negative for color change and rash  Neurological: Negative for seizures, syncope and headaches  All other systems reviewed and are negative  Objective: There were no vitals filed for this visit  Physical Exam -telephone encounter  VIRTUAL VISIT DISCLAIMER    Javan George verbally agrees to participate in [employer]  Pt is aware that [employer] could be limited without vital signs or the ability to perform a full hands-on physical Daja Starr understands she or the provider may request at any time to terminate the video visit and request the patient to seek care or treatment in person

## 2021-09-27 NOTE — ASSESSMENT & PLAN NOTE
Currently asymptomatic  Date of exposure:  9/27  Reports indirect exposure, direct contact is being tested for COVID  Patient requesting COVID test  Educated patient regarding false negative, this early in the course  COVID test ordered per patient's request  Will follow-up with results  Continue quarantine, masking, social distancing  Continue to monitor for symptoms

## 2021-09-28 PROCEDURE — 0241U HB NFCT DS VIR RESP RNA 4 TRGT: CPT | Performed by: FAMILY MEDICINE

## 2021-10-09 ENCOUNTER — NURSE TRIAGE (OUTPATIENT)
Dept: OTHER | Facility: OTHER | Age: 51
End: 2021-10-09

## 2021-10-14 ENCOUNTER — OFFICE VISIT (OUTPATIENT)
Dept: FAMILY MEDICINE CLINIC | Facility: CLINIC | Age: 51
End: 2021-10-14

## 2021-10-14 VITALS
SYSTOLIC BLOOD PRESSURE: 138 MMHG | HEIGHT: 63 IN | TEMPERATURE: 98.5 F | BODY MASS INDEX: 36.32 KG/M2 | RESPIRATION RATE: 18 BRPM | HEART RATE: 78 BPM | DIASTOLIC BLOOD PRESSURE: 90 MMHG | WEIGHT: 205 LBS | OXYGEN SATURATION: 98 %

## 2021-10-14 DIAGNOSIS — H66.91 RIGHT OTITIS MEDIA, UNSPECIFIED OTITIS MEDIA TYPE: Primary | ICD-10-CM

## 2021-10-14 DIAGNOSIS — Z23 ENCOUNTER FOR IMMUNIZATION: ICD-10-CM

## 2021-10-14 DIAGNOSIS — F17.200 TOBACCO USE DISORDER: ICD-10-CM

## 2021-10-14 PROCEDURE — 99213 OFFICE O/P EST LOW 20 MIN: CPT | Performed by: FAMILY MEDICINE

## 2021-10-14 PROCEDURE — 90682 RIV4 VACC RECOMBINANT DNA IM: CPT | Performed by: FAMILY MEDICINE

## 2021-10-14 PROCEDURE — 90471 IMMUNIZATION ADMIN: CPT | Performed by: FAMILY MEDICINE

## 2021-10-14 RX ORDER — AMOXICILLIN 875 MG/1
TABLET, COATED ORAL
COMMUNITY
Start: 2021-08-11 | End: 2021-10-14 | Stop reason: ALTCHOICE

## 2021-10-19 DIAGNOSIS — F41.1 GENERALIZED ANXIETY DISORDER: ICD-10-CM

## 2021-10-19 RX ORDER — ESCITALOPRAM OXALATE 10 MG/1
TABLET ORAL
Qty: 30 TABLET | Refills: 1 | Status: SHIPPED | OUTPATIENT
Start: 2021-10-19 | End: 2021-12-22 | Stop reason: SDUPTHER

## 2021-10-28 ENCOUNTER — OFFICE VISIT (OUTPATIENT)
Dept: FAMILY MEDICINE CLINIC | Facility: CLINIC | Age: 51
End: 2021-10-28

## 2021-10-28 VITALS
HEART RATE: 81 BPM | TEMPERATURE: 97.8 F | WEIGHT: 205.2 LBS | OXYGEN SATURATION: 94 % | RESPIRATION RATE: 18 BRPM | HEIGHT: 63 IN | SYSTOLIC BLOOD PRESSURE: 126 MMHG | DIASTOLIC BLOOD PRESSURE: 84 MMHG | BODY MASS INDEX: 36.36 KG/M2

## 2021-10-28 DIAGNOSIS — J30.9 ALLERGIC RHINITIS, UNSPECIFIED SEASONALITY, UNSPECIFIED TRIGGER: ICD-10-CM

## 2021-10-28 DIAGNOSIS — F17.200 TOBACCO USE DISORDER: ICD-10-CM

## 2021-10-28 DIAGNOSIS — H66.91 RIGHT OTITIS MEDIA, UNSPECIFIED OTITIS MEDIA TYPE: Primary | ICD-10-CM

## 2021-10-28 PROCEDURE — 4004F PT TOBACCO SCREEN RCVD TLK: CPT | Performed by: FAMILY MEDICINE

## 2021-10-28 PROCEDURE — 99213 OFFICE O/P EST LOW 20 MIN: CPT | Performed by: FAMILY MEDICINE

## 2021-10-28 PROCEDURE — 3008F BODY MASS INDEX DOCD: CPT | Performed by: FAMILY MEDICINE

## 2021-10-31 DIAGNOSIS — I10 ESSENTIAL HYPERTENSION: ICD-10-CM

## 2021-11-01 RX ORDER — HYDROCHLOROTHIAZIDE 25 MG/1
TABLET ORAL
Qty: 60 TABLET | Refills: 2 | Status: SHIPPED | OUTPATIENT
Start: 2021-11-01 | End: 2022-04-25

## 2021-12-11 DIAGNOSIS — E78.5 DYSLIPIDEMIA: ICD-10-CM

## 2021-12-12 RX ORDER — ATORVASTATIN CALCIUM 40 MG/1
TABLET, FILM COATED ORAL
Qty: 30 TABLET | Refills: 5 | Status: SHIPPED | OUTPATIENT
Start: 2021-12-12 | End: 2022-02-04 | Stop reason: SDUPTHER

## 2021-12-22 DIAGNOSIS — F41.1 GENERALIZED ANXIETY DISORDER: ICD-10-CM

## 2021-12-22 RX ORDER — ESCITALOPRAM OXALATE 10 MG/1
TABLET ORAL
Qty: 30 TABLET | Refills: 1 | Status: SHIPPED | OUTPATIENT
Start: 2021-12-22 | End: 2022-04-27

## 2021-12-30 ENCOUNTER — OFFICE VISIT (OUTPATIENT)
Dept: FAMILY MEDICINE CLINIC | Facility: CLINIC | Age: 51
End: 2021-12-30

## 2021-12-30 VITALS
SYSTOLIC BLOOD PRESSURE: 126 MMHG | HEART RATE: 93 BPM | DIASTOLIC BLOOD PRESSURE: 80 MMHG | WEIGHT: 206 LBS | HEIGHT: 63 IN | BODY MASS INDEX: 36.5 KG/M2 | OXYGEN SATURATION: 95 % | TEMPERATURE: 98 F

## 2021-12-30 DIAGNOSIS — H72.91 PERFORATION OF RIGHT TYMPANIC MEMBRANE: Primary | ICD-10-CM

## 2021-12-30 PROCEDURE — 99213 OFFICE O/P EST LOW 20 MIN: CPT | Performed by: FAMILY MEDICINE

## 2021-12-30 RX ORDER — AMOXICILLIN AND CLAVULANATE POTASSIUM 875; 125 MG/1; MG/1
1 TABLET, FILM COATED ORAL EVERY 12 HOURS SCHEDULED
Qty: 20 TABLET | Refills: 0 | Status: SHIPPED | OUTPATIENT
Start: 2021-12-30 | End: 2022-01-09

## 2021-12-30 RX ORDER — OFLOXACIN 3 MG/ML
10 SOLUTION AURICULAR (OTIC) 2 TIMES DAILY
Qty: 5 ML | Refills: 0 | Status: SHIPPED | OUTPATIENT
Start: 2021-12-30

## 2021-12-30 RX ORDER — SERTRALINE HYDROCHLORIDE 25 MG/1
TABLET, FILM COATED ORAL
COMMUNITY
End: 2022-04-27 | Stop reason: ALTCHOICE

## 2022-01-25 PROCEDURE — 87070 CULTURE OTHR SPECIMN AEROBIC: CPT | Performed by: OTOLARYNGOLOGY

## 2022-01-25 PROCEDURE — 87205 SMEAR GRAM STAIN: CPT | Performed by: OTOLARYNGOLOGY

## 2022-01-25 PROCEDURE — 87186 SC STD MICRODIL/AGAR DIL: CPT | Performed by: OTOLARYNGOLOGY

## 2022-01-25 PROCEDURE — 87077 CULTURE AEROBIC IDENTIFY: CPT | Performed by: OTOLARYNGOLOGY

## 2022-02-01 DIAGNOSIS — H69.82 DYSFUNCTION OF LEFT EUSTACHIAN TUBE: ICD-10-CM

## 2022-02-01 DIAGNOSIS — H93.8X2 EAR PRESSURE, LEFT: ICD-10-CM

## 2022-02-01 DIAGNOSIS — J30.9 ALLERGIC RHINITIS, UNSPECIFIED SEASONALITY, UNSPECIFIED TRIGGER: ICD-10-CM

## 2022-02-01 RX ORDER — LORATADINE 10 MG/1
TABLET ORAL
Qty: 30 TABLET | Refills: 5 | Status: SHIPPED | OUTPATIENT
Start: 2022-02-01 | End: 2022-07-27

## 2022-02-04 DIAGNOSIS — E78.5 DYSLIPIDEMIA: ICD-10-CM

## 2022-02-04 RX ORDER — ATORVASTATIN CALCIUM 40 MG/1
40 TABLET, FILM COATED ORAL DAILY
Qty: 30 TABLET | Refills: 0 | Status: SHIPPED | OUTPATIENT
Start: 2022-02-04 | End: 2022-03-06

## 2022-02-22 ENCOUNTER — HOSPITAL ENCOUNTER (OUTPATIENT)
Dept: RADIOLOGY | Facility: HOSPITAL | Age: 52
Discharge: HOME | End: 2022-02-22
Attending: OTOLARYNGOLOGY
Payer: COMMERCIAL

## 2022-02-22 DIAGNOSIS — H72.91 PERFORATION OF RIGHT TYMPANIC MEMBRANE: ICD-10-CM

## 2022-02-22 DIAGNOSIS — J32.9 CHRONIC SINUSITIS, UNSPECIFIED LOCATION: ICD-10-CM

## 2022-02-22 DIAGNOSIS — J34.2 DEVIATED NASAL SEPTUM: ICD-10-CM

## 2022-02-22 DIAGNOSIS — H90.71 MIXED CONDUCTIVE AND SENSORINEURAL HEARING LOSS OF RIGHT EAR WITH UNRESTRICTED HEARING OF LEFT EAR: ICD-10-CM

## 2022-02-22 PROCEDURE — 70486 CT MAXILLOFACIAL W/O DYE: CPT

## 2022-02-22 PROCEDURE — G1004 CDSM NDSC: HCPCS

## 2022-03-05 DIAGNOSIS — E78.5 DYSLIPIDEMIA: ICD-10-CM

## 2022-03-06 RX ORDER — ATORVASTATIN CALCIUM 40 MG/1
TABLET, FILM COATED ORAL
Qty: 30 TABLET | Refills: 0 | Status: SHIPPED | OUTPATIENT
Start: 2022-03-06 | End: 2022-03-09

## 2022-03-09 DIAGNOSIS — E78.5 DYSLIPIDEMIA: ICD-10-CM

## 2022-03-09 RX ORDER — ATORVASTATIN CALCIUM 40 MG/1
TABLET, FILM COATED ORAL
Qty: 30 TABLET | Refills: 0 | Status: SHIPPED | OUTPATIENT
Start: 2022-03-09 | End: 2022-04-06

## 2022-04-06 DIAGNOSIS — E78.5 DYSLIPIDEMIA: ICD-10-CM

## 2022-04-06 RX ORDER — ATORVASTATIN CALCIUM 40 MG/1
TABLET, FILM COATED ORAL
Qty: 30 TABLET | Refills: 0 | Status: SHIPPED | OUTPATIENT
Start: 2022-04-06 | End: 2022-05-03

## 2022-04-23 DIAGNOSIS — I10 ESSENTIAL HYPERTENSION: ICD-10-CM

## 2022-04-25 RX ORDER — HYDROCHLOROTHIAZIDE 25 MG/1
TABLET ORAL
Qty: 60 TABLET | Refills: 2 | Status: SHIPPED | OUTPATIENT
Start: 2022-04-25

## 2022-04-26 DIAGNOSIS — F41.1 GENERALIZED ANXIETY DISORDER: ICD-10-CM

## 2022-04-27 RX ORDER — ESCITALOPRAM OXALATE 10 MG/1
TABLET ORAL
Qty: 30 TABLET | Refills: 1 | Status: SHIPPED | OUTPATIENT
Start: 2022-04-27 | End: 2022-05-23

## 2022-05-03 DIAGNOSIS — E78.5 DYSLIPIDEMIA: ICD-10-CM

## 2022-05-03 RX ORDER — ATORVASTATIN CALCIUM 40 MG/1
TABLET, FILM COATED ORAL
Qty: 30 TABLET | Refills: 0 | Status: SHIPPED | OUTPATIENT
Start: 2022-05-03 | End: 2022-05-27

## 2022-05-21 DIAGNOSIS — F41.1 GENERALIZED ANXIETY DISORDER: ICD-10-CM

## 2022-05-23 RX ORDER — ESCITALOPRAM OXALATE 10 MG/1
TABLET ORAL
Qty: 30 TABLET | Refills: 1 | Status: SHIPPED | OUTPATIENT
Start: 2022-05-23 | End: 2022-06-08

## 2022-05-27 DIAGNOSIS — E78.5 DYSLIPIDEMIA: ICD-10-CM

## 2022-05-27 RX ORDER — ATORVASTATIN CALCIUM 40 MG/1
TABLET, FILM COATED ORAL
Qty: 90 TABLET | Refills: 1 | Status: SHIPPED | OUTPATIENT
Start: 2022-05-27

## 2022-05-30 ENCOUNTER — NURSE TRIAGE (OUTPATIENT)
Dept: OTHER | Facility: OTHER | Age: 52
End: 2022-05-30

## 2022-05-30 NOTE — TELEPHONE ENCOUNTER
Patient started Saturday with congestion, cough and a sore throat  No fever noted  Patient denies chest pain, SOB or wheezing  Care advice given

## 2022-05-30 NOTE — TELEPHONE ENCOUNTER
Regarding: Very stuffy coughing up green mucus with a bit of bleed, throa is sore   ----- Message from Herminio Busch sent at 5/30/2022  6:28 PM EDT -----  '' I'm very stuffy coughing up a lot green mucus with a bit of bleeding in it and my throat is sore ''

## 2022-05-30 NOTE — TELEPHONE ENCOUNTER
Reason for Disposition   Cough with cold symptoms (e g , runny nose, postnasal drip, throat clearing)    Protocols used: COUGH - ACUTE PRODUCTIVE-ADULT-AH

## 2022-06-07 DIAGNOSIS — F41.1 GENERALIZED ANXIETY DISORDER: ICD-10-CM

## 2022-06-08 RX ORDER — ESCITALOPRAM OXALATE 10 MG/1
TABLET ORAL
Qty: 90 TABLET | Refills: 1 | Status: SHIPPED | OUTPATIENT
Start: 2022-06-08

## 2022-07-21 ENCOUNTER — OFFICE VISIT (OUTPATIENT)
Dept: FAMILY MEDICINE CLINIC | Facility: CLINIC | Age: 52
End: 2022-07-21

## 2022-07-21 VITALS
TEMPERATURE: 98 F | SYSTOLIC BLOOD PRESSURE: 122 MMHG | OXYGEN SATURATION: 94 % | BODY MASS INDEX: 37.2 KG/M2 | WEIGHT: 210 LBS | DIASTOLIC BLOOD PRESSURE: 77 MMHG | HEART RATE: 92 BPM

## 2022-07-21 DIAGNOSIS — Z48.02 ENCOUNTER FOR REMOVAL OF SUTURES: Primary | ICD-10-CM

## 2022-07-21 PROCEDURE — 99213 OFFICE O/P EST LOW 20 MIN: CPT | Performed by: FAMILY MEDICINE

## 2022-07-21 NOTE — PROGRESS NOTES
Suture removal    Date/Time: 7/21/2022 4:13 PM  Performed by: Lebron Jackson MD  Authorized by: Lebron Jackson MD   Universal Protocol:  Procedure performed by:  Consent: Verbal consent obtained  Written consent not obtained  Risks and benefits: risks, benefits and alternatives were discussed  Consent given by: patient  Patient understanding: patient states understanding of the procedure being performed  Patient identity confirmed: verbally with patient        Patient location:  Clinic  Location:     Laterality:  Left    Location:  Other (comment)  Procedure details: Tools used:  Suture removal kit    Wound appearance:  Good wound healing, no sign(s) of infection and clean    Number of sutures removed:  2  Post-procedure details:     Post-removal:  No dressing applied    Patient tolerance of procedure: Tolerated well, no immediate complications  Comments:      Uncomplicated removal of two Sutures from left thenar eminence  Sutures lifted with Forceps before being cut off with scissors  Patient is  at the suture site but clean with no bleeding, no complications  Dr Bola Kevin present in room to observe

## 2022-07-26 DIAGNOSIS — H93.8X2 EAR PRESSURE, LEFT: ICD-10-CM

## 2022-07-26 DIAGNOSIS — H69.82 DYSFUNCTION OF LEFT EUSTACHIAN TUBE: ICD-10-CM

## 2022-07-26 DIAGNOSIS — J30.9 ALLERGIC RHINITIS, UNSPECIFIED SEASONALITY, UNSPECIFIED TRIGGER: ICD-10-CM

## 2022-07-27 RX ORDER — LORATADINE 10 MG/1
TABLET ORAL
Qty: 90 TABLET | Refills: 1 | Status: SHIPPED | OUTPATIENT
Start: 2022-07-27 | End: 2022-07-29

## 2022-07-28 DIAGNOSIS — J30.9 ALLERGIC RHINITIS, UNSPECIFIED SEASONALITY, UNSPECIFIED TRIGGER: ICD-10-CM

## 2022-07-28 DIAGNOSIS — H93.8X2 EAR PRESSURE, LEFT: ICD-10-CM

## 2022-07-28 DIAGNOSIS — H69.82 DYSFUNCTION OF LEFT EUSTACHIAN TUBE: ICD-10-CM

## 2022-07-29 RX ORDER — LORATADINE 10 MG/1
TABLET ORAL
Qty: 90 TABLET | Refills: 1 | Status: SHIPPED | OUTPATIENT
Start: 2022-07-29

## 2022-09-17 ENCOUNTER — OFFICE VISIT (OUTPATIENT)
Dept: URGENT CARE | Age: 52
End: 2022-09-17
Payer: COMMERCIAL

## 2022-09-17 VITALS — RESPIRATION RATE: 16 BRPM | TEMPERATURE: 97.1 F | OXYGEN SATURATION: 95 % | HEART RATE: 88 BPM

## 2022-09-17 DIAGNOSIS — B34.9 VIRAL SYNDROME: Primary | ICD-10-CM

## 2022-09-17 PROCEDURE — 87636 SARSCOV2 & INF A&B AMP PRB: CPT

## 2022-09-17 PROCEDURE — 99213 OFFICE O/P EST LOW 20 MIN: CPT

## 2022-09-17 NOTE — PATIENT INSTRUCTIONS
Saline nasal rinses as needed  Ibuprofen or Tylenol as needed  Quarantine in till [place] and flu test results  If COVID is positive, we recommend a 7 day quarantine from symptom onset  If flu is positive, we recommend quarantine until your fever free for 24 hours without fever reducing medications

## 2022-09-17 NOTE — PROGRESS NOTES
[facility]        NAME: Gregg Castillo is a 46 y o  female  : 1970    MRN: [id number]  DATE: 2022  TIME: 4:12 PM    Assessment and Plan   Viral syndrome [B34 9]  1  Viral syndrome  Cov/Flu-Collected at [facility] or [facility]         Patient Instructions     Supportive care as discussed  Follow up with PCP in 3-5 days  Proceed to  ER if symptoms worsen  Chief Complaint     Chief Complaint   Patient presents with    Nasal Congestion     Started 3 days ago; negative home COVID test yesterday    Cough    Vomiting         History of Present Illness       Patient presenting for evaluation of nasal congestion and cough  Patient states that her cough is productive green mucus  She states that her coughing causes her to have chest tenderness  She states that the symptoms started 5 days ago  Patient states that she had a fever last night, T-max of 100 1°  She states that she has been taking TheraFlu with minimal relief  She denies any chest pain, shortness a breath or chills  Patient states she took a home COVID test, and had negative test results  Patient states that she is vaccinated against COVID, and denies any recent sick contacts  Review of Systems   Review of Systems   Constitutional: Positive for fever  Negative for chills  HENT: Positive for congestion  Negative for ear pain and sore throat  Eyes: Negative for pain and visual disturbance  Respiratory: Positive for cough  Negative for shortness of breath  Cardiovascular: Negative for chest pain and palpitations  Gastrointestinal: Negative for abdominal pain and vomiting  Genitourinary: Negative for dysuria and hematuria  Musculoskeletal: Negative for arthralgias and back pain  Skin: Negative for color change and rash  Neurological: Negative for seizures and syncope  All other systems reviewed and are negative          Current Medications       Current Outpatient Medications:     albuterol (PROVENTIL HFA,VENTOLIN HFA) 90 mcg/act inhaler, USE 2 PUFFS EVERY 4 HOURS AS NEEDED FOR WHEEZING OR SHORTNESS OF BREATH, Disp: 8 5 Inhaler, Rfl: 0    atorvastatin (LIPITOR) 40 mg tablet, TAKE 1 TABLET BY MOUTH EVERY DAY, Disp: 90 tablet, Rfl: 1    benzonatate (TESSALON PERLES) 100 mg capsule, Take 1 capsule (100 mg total) by mouth 3 (three) times a day as needed for cough (Patient not taking: Reported on 8/24/2021), Disp: 20 capsule, Rfl: 0    Chantix 1 MG tablet, TAKE 1 TABLET BY MOUTH TWICE A DAY (Patient not taking: Reported on 8/24/2021), Disp: 56 tablet, Rfl: 1    escitalopram (LEXAPRO) 10 mg tablet, TAKE 1 TABLET BY MOUTH EVERY DAY, Disp: 90 tablet, Rfl: 1    fluticasone (FLONASE) 50 mcg/act nasal spray, 1 spray into each nostril daily, Disp: 1 Bottle, Rfl: 3    fluticasone (FLONASE) 50 mcg/act nasal spray, 2 sprays into each nostril daily, Disp: 16 g, Rfl: 3    hydrochlorothiazide (HYDRODIURIL) 25 mg tablet, TAKE 1 TABLET BY MOUTH EVERY DAY, Disp: 60 tablet, Rfl: 2    hydrocortisone-acetic acid (VOSOL-HC) otic solution, Administer 3 drops into the left ear every 6 (six) hours (Patient not taking: Reported on 10/14/2021), Disp: 10 mL, Rfl: 0    loratadine (CLARITIN) 10 mg tablet, TAKE 1 TABLET BY MOUTH EVERY DAY, Disp: 90 tablet, Rfl: 1    ofloxacin (FLOXIN) 0 3 % otic solution, Administer 10 drops to the right ear 2 (two) times a day, Disp: 5 mL, Rfl: 0    pantoprazole (PROTONIX) 40 mg tablet, Take 1 tablet (40 mg total) by mouth daily (Patient not taking: Reported on 8/24/2021), Disp: 60 tablet, Rfl: 0    Current Allergies     Allergies as of 09/17/2022 - Reviewed 09/17/2022   Allergen Reaction Noted    Bee pollen  12/13/2012    Pollen extract Allergic Rhinitis and Sneezing 12/13/2012            The following portions of the patient's history were reviewed and updated as appropriate: allergies, current medications, past family history, past medical history, past social history, past surgical history and problem list      Past Medical History:   Diagnosis Date    Depression 11/12/2019    Discharge from right nipple     last assessed 03/20/2013    GERD (gastroesophageal reflux disease) 3/3/2020    Hyperlipidemia     Hypertension     Loose stools 1/30/2018    Subclinical hypothyroidism 4/26/2019       Past Surgical History:   Procedure Laterality Date    CARPAL TUNNEL RELEASE      AR HYSTEROSCOPY,W/ENDO BX N/A 11/20/2018    Procedure: DILATATION AND CURETTAGE (D&C) WITH HYSTEROSCOPY;  Surgeon: Papa Bland DO;  [facility];  Service: Gynecology    TUBAL LIGATION      WRIST SURGERY Bilateral        Family History   Problem Relation Age of Onset    Heart attack Mother     Diabetes Father     Hypertension Father     Hyperlipidemia Father     Breast cancer Maternal Grandmother 61    Asthma Sister     Fibromyalgia Sister     No Known Problems Brother     Depression Daughter     No Known Problems Maternal Grandfather     No Known Problems Sister     No Known Problems Sister     No Known Problems Brother     Mental retardation Brother     Cerebral palsy Brother     ADD / ADHD Daughter     Bipolar disorder Daughter          Medications have been verified  Objective   Pulse 88   Temp (!) 97 1 °F (36 2 °C) (Temporal)   Resp 16   LMP 01/01/2016   SpO2 95%        Physical Exam     Physical Exam  Vitals and nursing note reviewed  Constitutional:       General: She is not in acute distress  Appearance: Normal appearance  She is not ill-appearing, toxic-appearing or diaphoretic  HENT:      Head: Normocephalic and atraumatic  Right Ear: Tympanic membrane normal       Left Ear: Tympanic membrane normal       Nose: Congestion present  No rhinorrhea  Mouth/Throat:      Mouth: Mucous membranes are moist       Pharynx: Oropharynx is clear  No oropharyngeal exudate or posterior oropharyngeal erythema  Eyes:      General:         Right eye: No discharge  Left eye: No discharge  Extraocular Movements: Extraocular movements intact  Conjunctiva/sclera: Conjunctivae normal       Pupils: Pupils are equal, round, and reactive to light  Cardiovascular:      Rate and Rhythm: Normal rate and regular rhythm  Pulses: Normal pulses  Heart sounds: Normal heart sounds  No murmur heard  No friction rub  No gallop  Pulmonary:      Effort: Pulmonary effort is normal  No respiratory distress  Breath sounds: Normal breath sounds  No stridor  No wheezing, rhonchi or rales  Chest:      Chest wall: No tenderness  Abdominal:      General: Abdomen is flat  Bowel sounds are normal       Palpations: Abdomen is soft  Tenderness: There is no abdominal tenderness  There is no guarding or rebound  Musculoskeletal:         General: No tenderness  Normal range of motion  Cervical back: Normal range of motion and neck supple  No tenderness  Skin:     General: Skin is warm and dry  Capillary Refill: Capillary refill takes less than 2 seconds  Neurological:      General: No focal deficit present  Mental Status: She is alert and oriented to person, place, and time     Psychiatric:         Mood and Affect: Mood normal          Behavior: Behavior normal

## 2022-09-18 LAB
FLUAV RNA RESP QL NAA+PROBE: NEGATIVE
FLUBV RNA RESP QL NAA+PROBE: NEGATIVE
SARS-COV-2 RNA RESP QL NAA+PROBE: NEGATIVE

## 2022-10-12 PROBLEM — H60.501 ACUTE OTITIS EXTERNA OF RIGHT EAR: Status: RESOLVED | Noted: 2018-11-16 | Resolved: 2022-10-12

## 2022-10-12 PROBLEM — J06.9 VIRAL UPPER RESPIRATORY TRACT INFECTION: Status: RESOLVED | Noted: 2019-01-29 | Resolved: 2022-10-12

## 2022-11-27 DIAGNOSIS — I10 ESSENTIAL HYPERTENSION: ICD-10-CM

## 2022-11-30 RX ORDER — HYDROCHLOROTHIAZIDE 25 MG/1
TABLET ORAL
Qty: 90 TABLET | Refills: 1 | Status: SHIPPED | OUTPATIENT
Start: 2022-11-30

## 2022-12-20 ENCOUNTER — OFFICE VISIT (OUTPATIENT)
Dept: FAMILY MEDICINE CLINIC | Facility: CLINIC | Age: 52
End: 2022-12-20

## 2022-12-20 VITALS
RESPIRATION RATE: 26 BRPM | HEART RATE: 89 BPM | SYSTOLIC BLOOD PRESSURE: 152 MMHG | BODY MASS INDEX: 36.64 KG/M2 | OXYGEN SATURATION: 97 % | DIASTOLIC BLOOD PRESSURE: 88 MMHG | WEIGHT: 206.8 LBS | TEMPERATURE: 98.7 F | HEIGHT: 63 IN

## 2022-12-20 DIAGNOSIS — J22 LOWER RESPIRATORY TRACT INFECTION: Primary | ICD-10-CM

## 2022-12-20 DIAGNOSIS — J45.21 MILD INTERMITTENT ASTHMATIC BRONCHITIS WITH ACUTE EXACERBATION: ICD-10-CM

## 2022-12-20 RX ORDER — ALBUTEROL SULFATE 90 UG/1
1 AEROSOL, METERED RESPIRATORY (INHALATION) EVERY 6 HOURS PRN
Qty: 18 G | Refills: 1 | Status: SHIPPED | OUTPATIENT
Start: 2022-12-20 | End: 2023-03-20

## 2022-12-20 RX ORDER — METHYLPREDNISOLONE 4 MG/1
TABLET ORAL
Qty: 21 EACH | Refills: 0 | Status: SHIPPED | OUTPATIENT
Start: 2022-12-20

## 2022-12-20 RX ORDER — BENZONATATE 200 MG/1
200 CAPSULE ORAL 3 TIMES DAILY PRN
Qty: 20 CAPSULE | Refills: 0 | Status: SHIPPED | OUTPATIENT
Start: 2022-12-20

## 2022-12-20 NOTE — PROGRESS NOTES
Name: Tim Gonsales      : 1970      MRN: [id number]  Encounter Provider: Nicolás Blevins MD  Encounter Date: 2022   Encounter department: [address] [place] [address]  Lower respiratory tract infection  Assessment & Plan:  Persistent lower respiratory infection for about a month without improvement  Lung exam significant for expiratory wheezing predominantly on the right side  Concerns for lower respiratory infection vs asthma exacerbation  Plan  • Steroid pack ordered for 6 day course, albuterol q4h for next two days, CXR and follow up in 2 days  • Tessalon perles for cough, TID prn  • Discussed importance of rest, hydration   • PRN Tyenol/NSAIDs, OTC for cough and congestion  • Return on Thursday for close monitoring  • Discuss ED evaluation parameters       Orders:  -     XR chest pa & lateral; Future; Expected date: 2022  -     Covid/Flu- Office Collect  -     methylPREDNISolone 4 MG tablet therapy pack; Use as directed on package  -     benzonatate (TESSALON) 200 MG capsule; Take 1 capsule (200 mg total) by mouth 3 (three) times a day as needed for cough    2  Mild intermittent asthmatic bronchitis with acute exacerbation  -     albuterol (PROVENTIL HFA,VENTOLIN HFA) 90 mcg/act inhaler; Inhale 1 puff every 6 (six) hours as needed for wheezing or shortness of breath          Return in about 2 days (around 2022) for with me for lower respiratory infection   Subjective      Tim Gonsales is a 59-year-old female here for ongoing URI symptoms that started about a month ago, previously went to urgent care several times without any treatment per patient  Passed out a few weeks ago due to dehydration and persistent cough per the ED doctors  URI   This is a recurrent problem  The current episode started 1 to 4 weeks ago (about a month ago)  The problem has been gradually worsening  There has been no fever   Associated symptoms include congestion, coughing, headaches (from coughing), joint pain (hands and shoulders), a plugged ear sensation, rhinorrhea, sneezing, a sore throat and vomiting (after excessive cough)  Pertinent negatives include no abdominal pain (when coughing), chest pain (when coughing), diarrhea, dysuria, ear pain, joint swelling, nausea, rash or sinus pain  Review of Systems   HENT: Positive for congestion, rhinorrhea, sneezing and sore throat  Negative for ear pain and sinus pain  Respiratory: Positive for cough  Cardiovascular: Negative for chest pain (when coughing)  Gastrointestinal: Positive for vomiting (after excessive cough)  Negative for abdominal pain (when coughing), diarrhea and nausea  Genitourinary: Negative for dysuria  Musculoskeletal: Positive for joint pain (hands and shoulders)  Skin: Negative for rash  Neurological: Positive for headaches (from coughing)         Current Outpatient Medications on File Prior to Visit   Medication Sig   • atorvastatin (LIPITOR) 40 mg tablet TAKE 1 TABLET BY MOUTH EVERY DAY   • escitalopram (LEXAPRO) 10 mg tablet TAKE 1 TABLET BY MOUTH EVERY DAY   • fluticasone (FLONASE) 50 mcg/act nasal spray 2 sprays into each nostril daily   • hydrochlorothiazide (HYDRODIURIL) 25 mg tablet TAKE 1 TABLET BY MOUTH EVERY DAY   • loratadine (CLARITIN) 10 mg tablet TAKE 1 TABLET BY MOUTH EVERY DAY   • [DISCONTINUED] albuterol (PROVENTIL HFA,VENTOLIN HFA) 90 mcg/act inhaler USE 2 PUFFS EVERY 4 HOURS AS NEEDED FOR WHEEZING OR SHORTNESS OF BREATH   • [DISCONTINUED] benzonatate (TESSALON PERLES) 100 mg capsule Take 1 capsule (100 mg total) by mouth 3 (three) times a day as needed for cough (Patient not taking: Reported on 8/24/2021)   • [DISCONTINUED] Chantix 1 MG tablet TAKE 1 TABLET BY MOUTH TWICE A DAY (Patient not taking: Reported on 8/24/2021)   • [DISCONTINUED] fluticasone (FLONASE) 50 mcg/act nasal spray 1 spray into each nostril daily   • [DISCONTINUED] hydrocortisone-acetic acid (VOSOL-HC) otic solution Administer 3 drops into the left ear every 6 (six) hours (Patient not taking: Reported on 10/14/2021)   • [DISCONTINUED] ofloxacin (FLOXIN) 0 3 % otic solution Administer 10 drops to the right ear 2 (two) times a day   • [DISCONTINUED] pantoprazole (PROTONIX) 40 mg tablet Take 1 tablet (40 mg total) by mouth daily (Patient not taking: Reported on 8/24/2021)       Objective     /88 (BP Location: Left arm, Patient Position: Sitting, Cuff Size: Large)   Pulse 89   Temp 98 7 °F (37 1 °C) (Temporal)   Resp (!) 26   Ht 5' 3" (1 6 m)   Wt 93 8 kg (206 lb 12 8 oz)   LMP 01/01/2016   SpO2 97%   BMI 36 63 kg/m²     Physical Exam  Vitals and nursing note reviewed  Constitutional:       General: She is not in acute distress  Appearance: Normal appearance  She is obese  She is not ill-appearing, toxic-appearing or diaphoretic  HENT:      Head: Normocephalic and atraumatic  Right Ear: External ear normal       Left Ear: External ear normal       Nose: Congestion and rhinorrhea present  Mouth/Throat:      Mouth: Mucous membranes are moist       Pharynx: No oropharyngeal exudate or posterior oropharyngeal erythema  Eyes:      General: No scleral icterus  Right eye: No discharge  Left eye: No discharge  Extraocular Movements: Extraocular movements intact  Conjunctiva/sclera: Conjunctivae normal       Pupils: Pupils are equal, round, and reactive to light  Cardiovascular:      Rate and Rhythm: Normal rate and regular rhythm  Pulses: Normal pulses  Heart sounds: Normal heart sounds  Pulmonary:      Effort: Pulmonary effort is normal  No respiratory distress  Breath sounds: Wheezing (right lung field throughout ) present  Comments: Coughing    Abdominal:      Tenderness: There is no abdominal tenderness  There is no guarding  Musculoskeletal:         General: No swelling        Cervical back: Normal range of motion  Right lower leg: No edema  Left lower leg: No edema  Skin:     General: Skin is warm  Capillary Refill: Capillary refill takes less than 2 seconds  Neurological:      General: No focal deficit present  Mental Status: She is alert and oriented to person, place, and time  Psychiatric:         Mood and Affect: Mood normal          Behavior: Behavior normal          Thought Content:  Thought content normal          Judgment: Judgment normal        Rafi Ureña MD

## 2022-12-20 NOTE — LETTER
December 20, 2022     Patient: Luna Dave  YOB: 1970  Date of Visit: 12/20/2022      To Whom it May Concern:    Luna Dave is under my professional care  Mtai Haro was seen in my office on 12/20/2022  Mati Haro may return to work on 12/23/2022  If you have any questions or concerns, please don't hesitate to call           Sincerely,          Malik Aguila MD

## 2022-12-20 NOTE — ASSESSMENT & PLAN NOTE
Persistent lower respiratory infection for about a month without improvement  Lung exam significant for expiratory wheezing predominantly on the right side  Concerns for lower respiratory infection vs asthma exacerbation  Plan  • Steroid pack ordered for 6 day course, albuterol q4h for next two days, CXR and follow up in 2 days  • Tessalon perles for cough, TID prn  • Discussed importance of rest, hydration   • PRN Tyenol/NSAIDs, OTC for cough and congestion     • Return on Thursday for close monitoring  • Discuss ED evaluation parameters

## 2022-12-21 ENCOUNTER — APPOINTMENT (OUTPATIENT)
Dept: RADIOLOGY | Age: 52
End: 2022-12-21

## 2022-12-21 DIAGNOSIS — J22 LOWER RESPIRATORY TRACT INFECTION: ICD-10-CM

## 2022-12-22 ENCOUNTER — OFFICE VISIT (OUTPATIENT)
Dept: FAMILY MEDICINE CLINIC | Facility: CLINIC | Age: 52
End: 2022-12-22

## 2022-12-22 VITALS
TEMPERATURE: 98.5 F | BODY MASS INDEX: 35.89 KG/M2 | HEART RATE: 88 BPM | SYSTOLIC BLOOD PRESSURE: 136 MMHG | WEIGHT: 202.6 LBS | OXYGEN SATURATION: 95 % | DIASTOLIC BLOOD PRESSURE: 77 MMHG

## 2022-12-22 DIAGNOSIS — J18.9 ATYPICAL PNEUMONIA: Primary | ICD-10-CM

## 2022-12-22 RX ORDER — AZITHROMYCIN 250 MG/1
TABLET, FILM COATED ORAL
Qty: 6 TABLET | Refills: 0 | Status: SHIPPED | OUTPATIENT
Start: 2022-12-22 | End: 2022-12-27

## 2022-12-22 NOTE — ASSESSMENT & PLAN NOTE
Worsening SOB, COVID/FLU/RSV negative  Pt is a chronic smoker, Xray is normal but does show some atelect vs  Possible PNA  DDx - bronchitis  Coughing yellow sputum  Afebrile, no chills  Steroids did not help acutely (perscribed 3 days ago)  - Zpak x5 days  - continue tessalon PRN  - continue tylenol PRN  - f/u PRN  - ED precautions reviewed

## 2022-12-22 NOTE — LETTER
December 22, 2022     Patient: Jcarlos Rouse  YOB: 1970  Date of Visit: 12/22/2022      To Whom it May Concern:    Jcarlos Rouse is under my professional care  Neema Bach was seen in my office on 12/22/2022  Neema Bach may return to work on 12/26/22  She is recovering from a respiratory infection and is NOT contagious       If you have any questions or concerns, please don't hesitate to call           Sincerely,          Ang Salas DO        CC: No Recipients

## 2022-12-22 NOTE — PROGRESS NOTES
Name: Sherman Stover      : 1970      MRN: [id number]  Encounter Provider: Estela Al DO  Encounter Date: 2022   Encounter department: [address] [place] [address]  Atypical pneumonia  Assessment & Plan:  Worsening SOB, COVID/FLU/RSV negative  Pt is a chronic smoker, Xray is normal but does show some atelect vs  Possible PNA  DDx - bronchitis  Coughing yellow sputum  Afebrile, no chills  Steroids did not help acutely (perscribed 3 days ago)  - Zpak x5 days  - continue tessalon PRN  - continue tylenol PRN  - f/u PRN  - ED precautions reviewed  Orders:  -     azithromycin (Zithromax) 250 mg tablet; Take 2 tablets (500 mg total) by mouth daily for 1 day, THEN 1 tablet (250 mg total) daily for 4 days  Subjective      Patient is a 60-year-old female presenting to the clinic with worsening lower respiratory symptoms with continued cough that is productive with yellow sputum, worsening shortness of breath and work of breathing  Patient states she was giving steroids 3 days ago with a likely asthma exacerbation and she was using her inhaler more frequently (albuterol)  Patient is back today stating this treatment modality did not help greatly  Patient smokes daily  Review of Systems   Constitutional: Positive for appetite change and fatigue  Negative for chills and fever  HENT: Positive for congestion and rhinorrhea  Negative for ear pain and sore throat  Eyes: Negative for pain and visual disturbance  Respiratory: Positive for cough and shortness of breath  Cardiovascular: Negative for chest pain and palpitations  Gastrointestinal: Negative for abdominal pain and vomiting  Genitourinary: Negative for dysuria and hematuria  Musculoskeletal: Negative for arthralgias and back pain  Skin: Negative for color change and rash  Neurological: Negative for seizures and syncope     All other systems reviewed and are negative  Current Outpatient Medications on File Prior to Visit   Medication Sig   • albuterol (PROVENTIL HFA,VENTOLIN HFA) 90 mcg/act inhaler Inhale 1 puff every 6 (six) hours as needed for wheezing or shortness of breath   • atorvastatin (LIPITOR) 40 mg tablet TAKE 1 TABLET BY MOUTH EVERY DAY   • benzonatate (TESSALON) 200 MG capsule Take 1 capsule (200 mg total) by mouth 3 (three) times a day as needed for cough   • escitalopram (LEXAPRO) 10 mg tablet TAKE 1 TABLET BY MOUTH EVERY DAY   • fluticasone (FLONASE) 50 mcg/act nasal spray 2 sprays into each nostril daily   • hydrochlorothiazide (HYDRODIURIL) 25 mg tablet TAKE 1 TABLET BY MOUTH EVERY DAY   • loratadine (CLARITIN) 10 mg tablet TAKE 1 TABLET BY MOUTH EVERY DAY   • methylPREDNISolone 4 MG tablet therapy pack Use as directed on package       Objective     /77 (BP Location: Left arm, Patient Position: Sitting, Cuff Size: Large)   Pulse 88   Temp 98 5 °F (36 9 °C) (Temporal)   Wt 91 9 kg (202 lb 9 6 oz)   LMP 01/01/2016   SpO2 95%   BMI 35 89 kg/m²     Physical Exam  Vitals reviewed  Constitutional:       General: She is not in acute distress  Appearance: She is obese  She is ill-appearing  She is not toxic-appearing or diaphoretic  HENT:      Head: Normocephalic and atraumatic  Right Ear: External ear normal       Left Ear: External ear normal       Nose: Congestion and rhinorrhea present  Mouth/Throat:      Mouth: Mucous membranes are moist       Pharynx: No oropharyngeal exudate or posterior oropharyngeal erythema  Eyes:      General: No scleral icterus  Right eye: No discharge  Left eye: No discharge  Extraocular Movements: Extraocular movements intact  Conjunctiva/sclera: Conjunctivae normal       Pupils: Pupils are equal, round, and reactive to light  Cardiovascular:      Rate and Rhythm: Normal rate and regular rhythm  Pulses: Normal pulses  Heart sounds: Normal heart sounds  Pulmonary:      Effort: Pulmonary effort is normal  No respiratory distress  Breath sounds: No wheezing (right lung field throughout )  Comments: Coughing  Decreased breath sounds in the right middle/lower lobe  Abdominal:      Tenderness: There is no abdominal tenderness  There is no guarding  Musculoskeletal:         General: No swelling  Cervical back: Normal range of motion  Right lower leg: No edema  Left lower leg: No edema  Skin:     General: Skin is warm  Capillary Refill: Capillary refill takes less than 2 seconds  Neurological:      General: No focal deficit present  Mental Status: She is alert and oriented to person, place, and time  Psychiatric:         Mood and Affect: Mood normal          Behavior: Behavior normal          Thought Content:  Thought content normal          Judgment: Judgment normal        Gaviota Frederick DO

## 2023-01-09 ENCOUNTER — OFFICE VISIT (OUTPATIENT)
Dept: FAMILY MEDICINE CLINIC | Facility: CLINIC | Age: 53
End: 2023-01-09

## 2023-01-09 VITALS
SYSTOLIC BLOOD PRESSURE: 132 MMHG | OXYGEN SATURATION: 95 % | TEMPERATURE: 97.9 F | BODY MASS INDEX: 37.02 KG/M2 | RESPIRATION RATE: 18 BRPM | WEIGHT: 209 LBS | HEART RATE: 82 BPM | DIASTOLIC BLOOD PRESSURE: 83 MMHG

## 2023-01-09 DIAGNOSIS — Z87.81 HISTORY OF FRACTURE OF NOSE: ICD-10-CM

## 2023-01-09 DIAGNOSIS — Z13.6 ENCOUNTER FOR LIPID SCREENING FOR CARDIOVASCULAR DISEASE: ICD-10-CM

## 2023-01-09 DIAGNOSIS — F17.200 CURRENT EVERY DAY SMOKER: Primary | ICD-10-CM

## 2023-01-09 DIAGNOSIS — Z11.4 SCREENING FOR HIV (HUMAN IMMUNODEFICIENCY VIRUS): ICD-10-CM

## 2023-01-09 DIAGNOSIS — Z12.11 ENCOUNTER FOR SCREENING COLONOSCOPY: ICD-10-CM

## 2023-01-09 DIAGNOSIS — Z11.59 NEED FOR HEPATITIS C SCREENING TEST: ICD-10-CM

## 2023-01-09 DIAGNOSIS — Z13.220 ENCOUNTER FOR LIPID SCREENING FOR CARDIOVASCULAR DISEASE: ICD-10-CM

## 2023-01-09 PROBLEM — Z72.0 CURRENTLY ATTEMPTING TO QUIT SMOKING: Status: ACTIVE | Noted: 2023-01-09

## 2023-01-09 RX ORDER — BUPROPION HYDROCHLORIDE 75 MG/1
75 TABLET ORAL 2 TIMES DAILY
Qty: 60 TABLET | Refills: 1 | Status: SHIPPED | OUTPATIENT
Start: 2023-01-09 | End: 2023-01-10

## 2023-01-09 RX ORDER — BUPROPION HYDROCHLORIDE 75 MG/1
75 TABLET ORAL 2 TIMES DAILY
Qty: 60 TABLET | Refills: 1 | Status: SHIPPED | OUTPATIENT
Start: 2023-01-09 | End: 2023-01-09

## 2023-01-09 NOTE — ASSESSMENT & PLAN NOTE
Patient with smoking history, persistent current cough, attempting to quit  Last PFT in 2019 at 74% FEV/FVC ratio, due for repeat  Patient has had prior bad experiences with Nicorette gum, Patch, and Chantex   Will initiate Welbutrin, F/U at annual physical

## 2023-01-09 NOTE — ASSESSMENT & PLAN NOTE
Patient endorses history of "Fracture" or other structural abnormality of nose, which she states she was previously following with ENT for before she lost her health insurance, would like to reestablish because she was told she'd need a surgery  Referral placed to help the process

## 2023-01-09 NOTE — PROGRESS NOTES
Name: Mary Corbett      : 1970      MRN: [id number]  Encounter Provider: Lisa Paniagua MD  Encounter Date: 2023   Encounter department: [address] [place] [address]     1  Current every day smoker  Assessment & Plan:  Patient with smoking history, persistent current cough, attempting to quit  Last PFT in 2019 at 74% FEV/FVC ratio, due for repeat  Patient has had prior bad experiences with Nicorette gum, Patch, and Chantex  Will initiate Welbutrin, F/U at annual physical     Orders:  -     Comprehensive metabolic panel; Future  -     Complete PFT with post bronchodilator; Future  -     buPROPion (Wellbutrin XL) 150 mg 24 hr tablet; Take 1 tablet (150 mg total) by mouth every morning    2  History of fracture of nose  Assessment & Plan:  Patient endorses history of "Fracture" or other structural abnormality of nose, which she states she was previously following with ENT for before she lost her health insurance, would like to reestablish because she was told she'd need a surgery  Referral placed to help the process  Orders:  -     Ambulatory Referral to Otolaryngology; Future    3  Encounter for lipid screening for cardiovascular disease  -     Lipid Panel with Direct LDL reflex; Future    4  Screening for HIV (human immunodeficiency virus)  -     : HIV 1/2 AB/AG w Reflex [facility] for 2 yr old and above; Future    5  Need for hepatitis C screening test  -     Hepatitis C antibody; Future    6  Encounter for screening colonoscopy  -     Ambulatory referral for colonoscopy; Future         Subjective      Patient presenting for follow up of atypical PNA, currently endorses improvement of symptoms, denies any current fevers, chest pain, shortness of breath  Has had persistent cough, endorses coughing fits where she has passed out, has occurred 2x over the past month  Has tried Flonase without relief, endorses her main issue being post nasal drip   Otherwise feeling well, endorses no other concerns  Review of Systems   Constitutional: Negative for fatigue and fever  HENT: Positive for congestion, postnasal drip, rhinorrhea and sore throat  Negative for ear pain and trouble swallowing  Eyes: Negative for visual disturbance  Respiratory: Positive for cough  Negative for choking, shortness of breath and wheezing  Cardiovascular: Negative for chest pain and palpitations  Gastrointestinal: Negative for abdominal pain, diarrhea, nausea and vomiting  Genitourinary: Negative for difficulty urinating and urgency  Musculoskeletal: Negative for back pain and joint swelling  Skin: Negative for rash and wound  Neurological: Positive for syncope and headaches  Current Outpatient Medications on File Prior to Visit   Medication Sig   • albuterol (PROVENTIL HFA,VENTOLIN HFA) 90 mcg/act inhaler Inhale 1 puff every 6 (six) hours as needed for wheezing or shortness of breath   • atorvastatin (LIPITOR) 40 mg tablet TAKE 1 TABLET BY MOUTH EVERY DAY   • escitalopram (LEXAPRO) 10 mg tablet TAKE 1 TABLET BY MOUTH EVERY DAY   • fluticasone (FLONASE) 50 mcg/act nasal spray 2 sprays into each nostril daily   • hydrochlorothiazide (HYDRODIURIL) 25 mg tablet TAKE 1 TABLET BY MOUTH EVERY DAY   • loratadine (CLARITIN) 10 mg tablet TAKE 1 TABLET BY MOUTH EVERY DAY   • benzonatate (TESSALON) 200 MG capsule Take 1 capsule (200 mg total) by mouth 3 (three) times a day as needed for cough   • methylPREDNISolone 4 MG tablet therapy pack Use as directed on package       Objective     /83   Pulse 82   Temp 97 9 °F (36 6 °C)   Resp 18   Wt 94 8 kg (209 lb)   LMP 01/01/2016   SpO2 95%   BMI 37 02 kg/m²     Physical Exam  Constitutional:       Appearance: Normal appearance  She is obese  HENT:      Head: Normocephalic and atraumatic        Right Ear: Tympanic membrane, ear canal and external ear normal       Left Ear: Tympanic membrane, ear canal and external ear normal       Nose: Congestion present  No rhinorrhea  Mouth/Throat:      Pharynx: Oropharynx is clear  Eyes:      Conjunctiva/sclera: Conjunctivae normal    Cardiovascular:      Rate and Rhythm: Regular rhythm  Heart sounds: Normal heart sounds  No murmur heard  Pulmonary:      Effort: Pulmonary effort is normal  No respiratory distress  Breath sounds: Normal breath sounds  No wheezing  Abdominal:      General: Bowel sounds are normal       Palpations: Abdomen is soft  Tenderness: There is no abdominal tenderness  Musculoskeletal:         General: Normal range of motion  Cervical back: Normal range of motion  Skin:     General: Skin is warm and dry  Neurological:      General: No focal deficit present  Mental Status: She is alert and oriented to person, place, and time     Psychiatric:         Mood and Affect: Mood normal        Chaya Douglass MD

## 2023-01-10 RX ORDER — BUPROPION HYDROCHLORIDE 150 MG/1
150 TABLET ORAL EVERY MORNING
Qty: 30 TABLET | Refills: 1 | Status: SHIPPED | OUTPATIENT
Start: 2023-01-10

## 2023-01-17 ENCOUNTER — APPOINTMENT (OUTPATIENT)
Dept: LAB | Facility: CLINIC | Age: 53
End: 2023-01-17

## 2023-01-17 DIAGNOSIS — Z11.59 NEED FOR HEPATITIS C SCREENING TEST: ICD-10-CM

## 2023-01-17 DIAGNOSIS — Z13.6 ENCOUNTER FOR LIPID SCREENING FOR CARDIOVASCULAR DISEASE: ICD-10-CM

## 2023-01-17 DIAGNOSIS — F17.200 CURRENT EVERY DAY SMOKER: ICD-10-CM

## 2023-01-17 DIAGNOSIS — Z11.4 SCREENING FOR HIV (HUMAN IMMUNODEFICIENCY VIRUS): ICD-10-CM

## 2023-01-17 DIAGNOSIS — Z13.220 ENCOUNTER FOR LIPID SCREENING FOR CARDIOVASCULAR DISEASE: ICD-10-CM

## 2023-01-17 LAB
ALBUMIN SERPL BCP-MCNC: 3.7 G/DL (ref 3.5–5)
ALP SERPL-CCNC: 137 U/L (ref 46–116)
ALT SERPL W P-5'-P-CCNC: 21 U/L (ref 12–78)
ANION GAP SERPL CALCULATED.3IONS-SCNC: 4 MMOL/L (ref 4–13)
AST SERPL W P-5'-P-CCNC: 11 U/L (ref 5–45)
BILIRUB SERPL-MCNC: 0.52 MG/DL (ref 0.2–1)
BUN SERPL-MCNC: 20 MG/DL (ref 5–25)
CALCIUM SERPL-MCNC: 9.2 MG/DL (ref 8.3–10.1)
CHLORIDE SERPL-SCNC: 105 MMOL/L (ref 96–108)
CHOLEST SERPL-MCNC: 218 MG/DL
CO2 SERPL-SCNC: 31 MMOL/L (ref 21–32)
CREAT SERPL-MCNC: 0.65 MG/DL (ref 0.6–1.3)
GFR SERPL CREATININE-BSD FRML MDRD: 102 ML/MIN/1.73SQ M
GLUCOSE P FAST SERPL-MCNC: 113 MG/DL (ref 65–99)
HCV AB SER QL: NORMAL
HDLC SERPL-MCNC: 32 MG/DL
HIV 1+2 AB+HIV1 P24 AG SERPL QL IA: NORMAL
HIV 2 AB SERPL QL IA: NORMAL
HIV1 AB SERPL QL IA: NORMAL
HIV1 P24 AG SERPL QL IA: NORMAL
LDLC SERPL CALC-MCNC: 172 MG/DL (ref 0–100)
POTASSIUM SERPL-SCNC: 3.5 MMOL/L (ref 3.5–5.3)
PROT SERPL-MCNC: 7.8 G/DL (ref 6.4–8.4)
SODIUM SERPL-SCNC: 140 MMOL/L (ref 135–147)
TRIGL SERPL-MCNC: 71 MG/DL

## 2023-01-24 ENCOUNTER — TELEPHONE (OUTPATIENT)
Dept: GASTROENTEROLOGY | Facility: CLINIC | Age: 53
End: 2023-01-24

## 2023-01-24 DIAGNOSIS — Z12.11 ENCOUNTER FOR SCREENING COLONOSCOPY: Primary | ICD-10-CM

## 2023-01-24 NOTE — TELEPHONE ENCOUNTER
Scheduled date of colonoscopy (as of today):03 28 23  Physician performing colonoscopy:DR GRUBER  Location of colonoscopy:[facility]  Bowel prep reviewed with patient:JUANJOSE  Instructions reviewed with patient by:MAILED  Clearances: N/A    01/16/23  Screened by: Juliano Espana    Referring Provider Dr Jacque Verma     Pre- Screening: Body mass index is 37 02 kg/m²  Has patient been referred for a routine screening Colonoscopy? yes  Is the patient between 39-70 years old? yes      Previous Colonoscopy no   If yes:    Date:     Facility:     Reason:       SCHEDULING STAFF: If the patient is between 45yrs-49yrs, please advise patient to confirm benefits/coverage with their insurance company for a routine screening colonoscopy, some insurance carriers will only cover at [address] or older  If the patient is over 66years old, please schedule an office visit  Does the patient want to see a Gastroenterologist prior to their procedure OR are they having any GI symptoms? no    Has the patient been hospitalized or had abdominal surgery in the past 6 months? no    Does the patient use supplemental oxygen? no    Does the patient take Coumadin, Lovenox, Plavix, Elliquis, Xarelto, or other blood thinning medication? no    Has the patient had a stroke, cardiac event, or stent placed in the past year? no    SCHEDULING STAFF: If patient answers NO to above questions, then schedule procedure  If patient answers YES to above questions, then schedule office appointment  If patient is between 45yrs - 49yrs, please advise patient that we will have to confirm benefits & coverage with their insurance company for a routine screening colonoscopy

## 2023-01-31 NOTE — H&P (VIEW-ONLY)
[facility]  Emiliana Hardin 46 y o  female MRN: [id number]  Encounter: [id number]  Fred Pickens MD  Office : [phone]  Also available on [employer]    Thank you for referring Emiliana Hardin for an evaluation  My recommendations are included  Please do not hesitate to contact me with any questions you may have  ASSESSMENT AND PLAN:      1  Deviated nasal septum        2  Perforation of right tympanic membrane        3  Mixed conductive and sensorineural hearing loss of right ear with unrestricted hearing of left ear        4  Recurrent sinusitis        5  Snoring          Patient with chronic right tympanic membrane perforation   Ear dry  Continue water precautions, also instructed to blow very gently nose to avoid contamination of middle ear with mucus from nasopharynx  Continue to use fluticasone, despite minimal impacted this point  Recommend septoplasty and turbinoplasty, possible  graft left, to optimize nasal function prior to tympanoplasty  Risks, benefits and alternatives of surgery discussed in detail  Expected perioperative course and care also reviewed  Questions answered as needed  Patient decides to proceed with surgery and signes informed consent      ______________________________________________________________________    Reason for consultation : Recurrent ear infections    HPI: Emiliana Hardin is a 46 y o  female initially seen 1/2022 with  recurrent infections in her ears, 4 episodes in the previous 2 months  She also reports having chronic nasal obstruction the last 2 or 3 months  Treated mostly with antibiotics as well as eardrops  She was told recently that she apparently has a perforation of 1 eardrum  She also has been on fluticasone 2 sprays in each nostril twice a day  Review of records confirms that she has been given amoxicillin, azithromycin, Cefnidir and Augmentin  Patient had not had any significant issues since early childhood when she had recurrent infections and had BMT (only Once)    1/31/2023 in the year interval since the last visit patient has had "multiple sinus infections "also ear infections, most recently treated with antibiotics, most recently with azithromycin  Also on flonase, "I try to do it every other day, one spray each nostril"    PRIOR VISIT, ENDOSCOPIC EVALUATION :   Nasal endoscopy:  Endoscopy reveals deviated septum to the left, enlarged right inferior and middle turbinates, no active discharge or middle meatus at this point  Torus tubarius with mild erythema  There is some mucoid discharge on the torus tubarium  No nasopharyngeal mass or lesion  Unable to advance the endoscope of the left nasal cavity due to the severity of the deviation, most anterior portion of middle meatus visualized and appears to be free of disease without any evident polyps    REVIEW OF SYSTEMS:    Review of systems:  10 Point ROS was performed and negative except as above or otherwise noted in the medical record      Historical Information   Past Medical History:   Diagnosis Date   • Allergic Uk   • Arthritis 2018   • Depression 11/12/2019   • Discharge from right nipple     last assessed 03/20/2013   • GERD (gastroesophageal reflux disease) 03/03/2020   • Hyperlipidemia    • Hypertension    • Loose stools 01/30/2018   • Subclinical hypothyroidism 04/26/2019     Past Surgical History:   Procedure Laterality Date   • CARPAL TUNNEL RELEASE     • OR HYSTEROSCOPY BX ENDOMETRIUM&/POLYPC W/WO D&C N/A 11/20/2018    Procedure: DILATATION AND CURETTAGE (D&C) WITH HYSTEROSCOPY;  Surgeon: Norma Spurling, DO;  Location: [facility];  Service: Gynecology   • TUBAL LIGATION     • WRIST SURGERY Bilateral      Social History   Social History     Substance and Sexual Activity   Alcohol Use No     Social History     Substance and Sexual Activity   Drug Use Never     Social History     Tobacco Use   Smoking Status Every Day   • Packs/day: 0 50   • Years: 35 00   • Pack years: 17 50   • Types: Cigarettes   Smokeless Tobacco Never     Family History   Problem Relation Age of Onset   • Heart attack Mother    • Diabetes Father    • Hypertension Father    • Hyperlipidemia Father    • Breast cancer Maternal Grandmother    • Asthma Sister    • Fibromyalgia Sister    • No Known Problems Brother    • Depression Daughter    • No Known Problems Maternal Grandfather    • ADD / ADHD Paternal Grandmother    • No Known Problems Sister    • No Known Problems Sister    • No Known Problems Brother    • Mental retardation Brother    • Cerebral palsy Brother    • ADD / ADHD Daughter    • Bipolar disorder Daughter        Meds/Allergies       Current Outpatient Medications:   •  albuterol (PROVENTIL HFA,VENTOLIN HFA) 90 mcg/act inhaler  •  atorvastatin (LIPITOR) 40 mg tablet  •  benzonatate (TESSALON) 200 MG capsule  •  buPROPion (Wellbutrin XL) 150 mg 24 hr tablet  •  escitalopram (LEXAPRO) 10 mg tablet  •  fluticasone (FLONASE) 50 mcg/act nasal spray  •  hydrochlorothiazide (HYDRODIURIL) 25 mg tablet  •  loratadine (CLARITIN) 10 mg tablet  •  methylPREDNISolone 4 MG tablet therapy pack  •  polyethylene glycol (GOLYTELY) 4000 mL solution    Allergies   Allergen Reactions   • Bee Pollen      Other reaction(s): Allergic Rhinitis, Sneezing   • Pollen Extract Allergic Rhinitis and Sneezing         PHYSICAL EXAM:    Temperature (!) 97 1 °F (36 2 °C), weight 94 8 kg (209 lb), last menstrual period 01/01/2016, not currently breastfeeding  Body mass index is 37 02 kg/m²  Constitutional: Oriented to person, place, and time  Well-developed and well-nourished, no apparent distress, non-toxic appearance  Cooperative, able to hear and answer questions without difficulty  Voice: Normal voice quality  Head: Normocephalic, atraumatic  No scars, masses or lesions  Face: Symmetric, no edema, no sinus tenderness  Eyes: Vision grossly intact, extra-ocular movement intact    Right Ear: External ear normal   Auditory canal With cloudy yellow discharge     Tympanic membrane With diffuse myringosclerosis, with central  membrane perforation  No postauricular erythema or tenderness  Left Ear: External ear normal   Auditory canal clear  Tympanic membrane With significant myringosclerosis, posterior superiorly there is a monomeric membrane  No evidence of middle ear effusion  No post-auricular erythema or tenderness  Nose: Septum With severe left septal deviation contacting lateral wall, very narrow left nasal valve  Mucosa moist, turbinates well appearing  No crusting, polyps or discharge evident  The severity of the deviation does not allow for evaluation of the nasal valve collapse with a Q-tip test or the Blackford maneuver as there is virtually no airflow on the left nasal cavity at all  Oral cavity: Dentition intact  Mucosa moist, lips normal   Tongue mobile, floor of mouth normal   Hard palate unremarkable  No masses or lesions  Oropharynx: Uvula is midline, soft palate normal   Unremarkable oropharyngeal inlet  Tonsils 2+unremarkable  Posterior pharyngeal wall clear  Narrow pharyngeal and left No masses or lesions  Salivary glands:  Parotid glands and submandibular glands symmetric, no enlargement or tenderness  Neck: Normal laryngeal elevation with swallow  Trachea midline  No masses or lesions  No palpable adenopathy  Thyroid: normal in size, unremarkable without tenderness or palpable nodules  Pulmonary/Chest: Normal effort and rate  No respiratory distress  Heart: S1 S2 RRR  Lungs CTAB  Musculoskeletal: Normal range of motion  Extremities are normal on exam   Neurological: Cranial nerves 2-12 intact  Abdomen: Soft, non tender  Skin: Skin is warm and dry  Psychiatric: Normal mood and affect  Audiometry:   01/25/2022 Right conductive hearing loss Pure-tone average 18 left ear 48 right ear with a 30 dB air bone gap, type B tympanogram in the right ear volume 1 5   Left ear with type A tympanogram 1 0 volume  Excellent word discrimination with amplification  SRT at 45 dB on the right ear  1/31/2023 left ear with pure-tone average at 13 dB, right ear at 37 dB with a conductive hearing loss  Type a tympanogram left ear, right ear with a larger volume, appears to have a very shallow curve resending a type As? SRT 20 dB left 30 dB right excellent word discrimination  Imaging:  CT scan sinuses 02/22/2022, very severe deviation of nasal septum complete obstruction of left nasal cavity  The obstruction extends from the nasal vestibule to the posterior portion of the nose  There is significant hypertrophy of turbinates on the right side  Sinuses are actually healthy with no mucosal thickening or air-fluid levels or opacification  Only single finding is a very small mucosal retention cyst in the floor of right maxillary sinus in vicinity of a dental root    Mastoid and middle ear cavity visualized well aerated bilaterally, right mastoid poorly developed, only 1 cell on the tip appears opacified, no evidence of cholesteatoma, no evidence of opacification or fluid in middle ear cavities

## 2023-02-06 DIAGNOSIS — F17.200 CURRENT EVERY DAY SMOKER: ICD-10-CM

## 2023-02-07 RX ORDER — BUPROPION HYDROCHLORIDE 150 MG/1
TABLET ORAL
Qty: 90 TABLET | Refills: 1 | Status: SHIPPED | OUTPATIENT
Start: 2023-02-07

## 2023-02-14 ENCOUNTER — APPOINTMENT (OUTPATIENT)
Dept: LAB | Facility: CLINIC | Age: 53
End: 2023-02-14

## 2023-02-14 DIAGNOSIS — J34.2 DEVIATED NASAL SEPTUM: ICD-10-CM

## 2023-02-14 DIAGNOSIS — J34.3 HYPERTROPHY OF NASAL TURBINATES: ICD-10-CM

## 2023-02-14 DIAGNOSIS — J34.89 NASAL OBSTRUCTION: ICD-10-CM

## 2023-02-14 LAB
ANION GAP SERPL CALCULATED.3IONS-SCNC: 2 MMOL/L (ref 4–13)
BASOPHILS # BLD AUTO: 0.03 THOUSANDS/ÂΜL (ref 0–0.1)
BASOPHILS NFR BLD AUTO: 0 % (ref 0–1)
BUN SERPL-MCNC: 14 MG/DL (ref 5–25)
CALCIUM SERPL-MCNC: 9.3 MG/DL (ref 8.3–10.1)
CHLORIDE SERPL-SCNC: 102 MMOL/L (ref 96–108)
CO2 SERPL-SCNC: 32 MMOL/L (ref 21–32)
CREAT SERPL-MCNC: 0.68 MG/DL (ref 0.6–1.3)
EOSINOPHIL # BLD AUTO: 0.39 THOUSAND/ÂΜL (ref 0–0.61)
EOSINOPHIL NFR BLD AUTO: 4 % (ref 0–6)
ERYTHROCYTE [DISTWIDTH] IN BLOOD BY AUTOMATED COUNT: 13.2 % (ref 11.6–15.1)
GFR SERPL CREATININE-BSD FRML MDRD: 100 ML/MIN/1.73SQ M
GLUCOSE P FAST SERPL-MCNC: 132 MG/DL (ref 65–99)
HCT VFR BLD AUTO: 40.9 % (ref 34.8–46.1)
HGB BLD-MCNC: 13.3 G/DL (ref 11.5–15.4)
IMM GRANULOCYTES # BLD AUTO: 0.04 THOUSAND/UL (ref 0–0.2)
IMM GRANULOCYTES NFR BLD AUTO: 0 % (ref 0–2)
LYMPHOCYTES # BLD AUTO: 2.33 THOUSANDS/ÂΜL (ref 0.6–4.47)
LYMPHOCYTES NFR BLD AUTO: 22 % (ref 14–44)
MCH RBC QN AUTO: 29.3 PG (ref 26.8–34.3)
MCHC RBC AUTO-ENTMCNC: 32.5 G/DL (ref 31.4–37.4)
MCV RBC AUTO: 90 FL (ref 82–98)
MONOCYTES # BLD AUTO: 0.58 THOUSAND/ÂΜL (ref 0.17–1.22)
MONOCYTES NFR BLD AUTO: 6 % (ref 4–12)
NEUTROPHILS # BLD AUTO: 7.2 THOUSANDS/ÂΜL (ref 1.85–7.62)
NEUTS SEG NFR BLD AUTO: 68 % (ref 43–75)
NRBC BLD AUTO-RTO: 0 /100 WBCS
PLATELET # BLD AUTO: 299 THOUSANDS/UL (ref 149–390)
PMV BLD AUTO: 9.6 FL (ref 8.9–12.7)
POTASSIUM SERPL-SCNC: 3.3 MMOL/L (ref 3.5–5.3)
RBC # BLD AUTO: 4.54 MILLION/UL (ref 3.81–5.12)
SODIUM SERPL-SCNC: 136 MMOL/L (ref 135–147)
WBC # BLD AUTO: 10.57 THOUSAND/UL (ref 4.31–10.16)

## 2023-02-22 ENCOUNTER — OFFICE VISIT (OUTPATIENT)
Dept: FAMILY MEDICINE CLINIC | Facility: CLINIC | Age: 53
End: 2023-02-22

## 2023-02-22 VITALS
OXYGEN SATURATION: 95 % | BODY MASS INDEX: 36.39 KG/M2 | DIASTOLIC BLOOD PRESSURE: 80 MMHG | HEIGHT: 63 IN | HEART RATE: 93 BPM | SYSTOLIC BLOOD PRESSURE: 131 MMHG | TEMPERATURE: 97.7 F | RESPIRATION RATE: 20 BRPM | WEIGHT: 205.4 LBS

## 2023-02-22 DIAGNOSIS — Z00.00 ANNUAL PHYSICAL EXAM: Primary | ICD-10-CM

## 2023-02-22 DIAGNOSIS — E66.9 OBESITY (BMI 35.0-39.9 WITHOUT COMORBIDITY): ICD-10-CM

## 2023-02-22 DIAGNOSIS — Z72.0 SMOKING TRYING TO QUIT: ICD-10-CM

## 2023-02-22 DIAGNOSIS — Z12.4 SCREENING FOR CERVICAL CANCER: ICD-10-CM

## 2023-02-22 DIAGNOSIS — Z13.820 ENCOUNTER FOR OSTEOPOROSIS SCREENING IN ASYMPTOMATIC POSTMENOPAUSAL PATIENT: ICD-10-CM

## 2023-02-22 DIAGNOSIS — Z78.0 ENCOUNTER FOR OSTEOPOROSIS SCREENING IN ASYMPTOMATIC POSTMENOPAUSAL PATIENT: ICD-10-CM

## 2023-02-22 NOTE — PROGRESS NOTES
[address] [facility]    NAME: Yasmany Loyd  AGE: 48 y o  SEX: female  : 1970     DATE: 2023     Assessment and Plan:     Problem List Items Addressed This Visit        Other    Smoking trying to quit     Last visit, initiated Wellbutrin and advised nicotine gum, PFTs  Patient has not gotten PFT but has successfully abstained from cigarettes for the past month  Encouragement given, will continue to follow  Screening for cervical cancer     Patient due for cervical cancer screening per guidelines, referral placed  Patient also has active order for mammogram, encouraged patient to follow through  Relevant Orders    Liquid-based pap, screening ([facility])    Encounter for osteoporosis screening in asymptomatic postmenopausal patient     Patient due for screening per guidelines  Relevant Orders    DXA bone density spine hip and pelvis   Other Visit Diagnoses     Annual physical exam    -  Primary    Obesity (BMI 35 0-39 9 without comorbidity)              Immunizations and preventive care screenings were discussed with patient today  Appropriate education was printed on patient's after visit summary  Counseling:  Alcohol/drug use: discussed moderation in alcohol intake, the recommendations for healthy alcohol use, and avoidance of illicit drug use  Dental Health: discussed importance of regular tooth brushing, flossing, and dental visits  Injury prevention: discussed safety/seat belts, safety helmets, smoke detectors, carbon dioxide detectors, and smoking near bedding or upholstery  · Exercise: the importance of regular exercise/physical activity was discussed  Recommend exercise 3-5 times per week for at least 30 minutes  BMI Counseling: Body mass index is 36 38 kg/m²   The BMI is above normal  Nutrition recommendations include decreasing portion sizes, encouraging healthy choices of fruits and vegetables, consuming healthier snacks and reducing intake of cholesterol  Exercise recommendations include moderate physical activity 150 minutes/week and exercising 3-5 times per week  No pharmacotherapy was ordered  Patient referred to PCP  Rationale for BMI follow-up plan is due to patient being overweight or obese  Tobacco Cessation Counseling: Tobacco cessation counseling was provided  The patient is sincerely urged to quit consumption of tobacco  She is ready to quit tobacco  Bupropion SR and nicotine gum was prescribed  Patient is doing well with Welbutrin and nicotine gum as discussed last visit, is 1 month free of tobacco as of today  Encouragement given and will continue to follow  Lung Cancer Screening Shared Decision Making: I discussed with her that she is a candidate for lung cancer CT screening  The following Shared Decision-Making points were covered:  1  Benefits of screening were discussed, including the rates of reduction in death from lung cancer and other causes  Harms of screening were reviewed, including false positive tests, radiation exposure levels, risks of invasive procedures, risks of complications of screening, and risk of overdiagnosis  2  I counseled on the importance of adherence to annual lung cancer LDCT screening, impact of co-morbidities, and ability or willingness to undergo diagnosis and treatment  3  I counseled on the importance of maintaining abstinence as a former smoker or was counseled on the importance of smoking cessation if a current smoker    Review of Eligibility Criteria: She meets all of the criteria for Lung Cancer Screening    - She is 48 y o    - She has 20 pack year tobacco history and is a current smoker or has quit within the past 15 years  - She presents no signs or symptoms of lung cancer    After discussion, the patient decided to elect lung cancer screening  Return in 3 months (on 5/22/2023)       Chief Complaint:     Chief Complaint   Patient presents with   • Physical Exam      History of Present Illness:     Adult Annual Physical   Patient here for a comprehensive physical exam  The patient reports no problems  Diet and Physical Activity  · Diet/Nutrition: well balanced diet and limited junk food  · Exercise: no formal exercise  Depression Screening  PHQ-2/9 Depression Screening    Little interest or pleasure in doing things: 0 - not at all  Feeling down, depressed, or hopeless: 0 - not at all  Trouble falling or staying asleep, or sleeping too much: 0 - not at all  Feeling tired or having little energy: 0 - not at all  Poor appetite or overeatin - not at all  Feeling bad about yourself - or that you are a failure or have let yourself or your family down: 0 - not at all  Trouble concentrating on things, such as reading the newspaper or watching television: 0 - not at all  Moving or speaking so slowly that other people could have noticed  Or the opposite - being so fidgety or restless that you have been moving around a lot more than usual: 0 - not at all  Thoughts that you would be better off dead, or of hurting yourself in some way: 0 - not at all  PHQ-9 Score: 0   PHQ-9 Interpretation: No or Minimal depression        General Health  · Sleep: sleeps well and gets more than 8 hours of sleep on average  · Hearing: normal - bilateral   · Vision: no vision problems and most recent eye exam <1 year ago  · Dental: regular dental visits  /GYN Health  · Patient is: postmenopausal  · Last menstrual period: 2018     Review of Systems:     Review of Systems   Constitutional: Negative for chills and fever  HENT: Negative for ear pain and sore throat  Eyes: Negative for pain and visual disturbance  Respiratory: Negative for cough and shortness of breath  Cardiovascular: Negative for chest pain and palpitations  Gastrointestinal: Negative for abdominal pain and vomiting     Genitourinary: Negative for dysuria and hematuria  Musculoskeletal: Negative for arthralgias and back pain  Skin: Negative for color change and rash  Neurological: Negative for seizures and syncope  All other systems reviewed and are negative       Past Medical History:     Past Medical History:   Diagnosis Date   • Allergic Uk   • Arthritis 2018   • Depression 11/12/2019   • Discharge from right nipple     last assessed 03/20/2013   • GERD (gastroesophageal reflux disease) 03/03/2020   • Hyperlipidemia    • Hypertension    • Loose stools 01/30/2018   • Subclinical hypothyroidism 04/26/2019      Past Surgical History:     Past Surgical History:   Procedure Laterality Date   • CARPAL TUNNEL RELEASE     • [place] HYSTEROSCOPY BX ENDOMETRIUM&/POLYPC W/WO D&C N/A 11/20/2018    Procedure: DILATATION AND CURETTAGE (D&C) WITH HYSTEROSCOPY;  Surgeon: Annika Porras DO;  Location[facility];  Service: Gynecology   • TUBAL LIGATION     • WRIST SURGERY Bilateral       Social History:     Social History     Socioeconomic History   • Marital status:      Spouse name: None   • Number of children: None   • Years of education: None   • Highest education level: None   Occupational History   • None   Tobacco Use   • Smoking status: Every Day     Packs/day: 0 50     Years: 35 00     Pack years: 17 50     Types: Cigarettes   • Smokeless tobacco: Never   Vaping Use   • Vaping Use: Former   • Substances: Nicotine, Flavoring   Substance and Sexual Activity   • Alcohol use: No   • Drug use: Never   • Sexual activity: Yes     Partners: Male   Other Topics Concern   • None   Social History Narrative    Current everyday smoker per [employer]     Social Determinants of Health     Financial Resource Strain: Low Risk    • Difficulty of Paying Living Expenses: Not hard at all   Food Insecurity: No Food Insecurity   • Worried About Running Out of Food in the Last Year: Never true   • Ran Out of Food in the Last Year: Never true   Transportation Needs: No Transportation Needs   • Lack of Transportation (Medical): No   • Lack of Transportation (Non-Medical):  No   Physical Activity: Inactive   • Days of Exercise per Week: 0 days   • Minutes of Exercise per Session: 0 min   Stress: No Stress Concern Present   • Feeling of Stress : Not at all   Social Connections: Not on file   Intimate Partner Violence: Not At Risk   • Fear of Current or Ex-Partner: No   • Emotionally Abused: No   • Physically Abused: No   • Sexually Abused: No   Housing Stability: Low Risk    • Unable to Pay for Housing in the Last Year: No   • Number of Places Lived in the Last Year: 1   • Unstable Housing in the Last Year: No      Family History:     Family History   Problem Relation Age of Onset   • Heart attack Mother    • Diabetes Father    • Hypertension Father    • Hyperlipidemia Father    • Breast cancer Maternal Grandmother    • Asthma Sister    • Fibromyalgia Sister    • No Known Problems Brother    • Depression Daughter    • No Known Problems Maternal Grandfather    • ADD / ADHD Paternal Grandmother    • No Known Problems Sister    • No Known Problems Sister    • No Known Problems Brother    • Mental retardation Brother    • Cerebral palsy Brother    • ADD / ADHD Daughter    • Bipolar disorder Daughter       Current Medications:     Current Outpatient Medications   Medication Sig Dispense Refill   • albuterol (PROVENTIL HFA,VENTOLIN HFA) 90 mcg/act inhaler Inhale 1 puff every 6 (six) hours as needed for wheezing or shortness of breath 18 g 1   • atorvastatin (LIPITOR) 40 mg tablet TAKE 1 TABLET BY MOUTH EVERY DAY 90 tablet 1   • buPROPion (WELLBUTRIN XL) 150 mg 24 hr tablet TAKE 1 TABLET BY MOUTH EVERY DAY IN THE MORNING 90 tablet 1   • escitalopram (LEXAPRO) 10 mg tablet TAKE 1 TABLET BY MOUTH EVERY DAY 90 tablet 1   • fluticasone (FLONASE) 50 mcg/act nasal spray 2 sprays into each nostril daily 16 g 3   • hydrochlorothiazide (HYDRODIURIL) 25 mg tablet TAKE 1 TABLET BY MOUTH EVERY DAY 90 tablet 1   • loratadine (CLARITIN) 10 mg tablet TAKE 1 TABLET BY MOUTH EVERY DAY 90 tablet 1   • benzonatate (TESSALON) 200 MG capsule Take 1 capsule (200 mg total) by mouth 3 (three) times a day as needed for cough 20 capsule 0   • methylPREDNISolone 4 MG tablet therapy pack Use as directed on package 21 each 0   • polyethylene glycol (GOLYTELY) 4000 mL solution Take 4,000 mL by mouth once for 1 dose Take as directed by office 4000 mL 0     No current facility-administered medications for this visit  Allergies: Allergies   Allergen Reactions   • Bee Pollen      Other reaction(s): Allergic Rhinitis, Sneezing   • Pollen Extract Allergic Rhinitis and Sneezing      Physical Exam:     /80   Pulse 93   Temp 97 7 °F (36 5 °C)   Resp 20   Ht 5' 3" (1 6 m)   Wt 93 2 kg (205 lb 6 4 oz)   LMP 01/01/2016   SpO2 95%   BMI 36 38 kg/m²     Physical Exam  Vitals reviewed  Constitutional:       General: She is in acute distress  Appearance: Normal appearance  She is well-developed  She is obese  HENT:      Head: Normocephalic and atraumatic  Right Ear: Tympanic membrane, ear canal and external ear normal       Left Ear: Tympanic membrane, ear canal and external ear normal       Nose: Nose normal       Mouth/Throat:      Pharynx: Oropharynx is clear  No oropharyngeal exudate or posterior oropharyngeal erythema  Eyes:      Extraocular Movements: Extraocular movements intact  Conjunctiva/sclera: Conjunctivae normal       Pupils: Pupils are equal, round, and reactive to light  Cardiovascular:      Rate and Rhythm: Normal rate and regular rhythm  Heart sounds: No murmur heard  Pulmonary:      Effort: Pulmonary effort is normal  No respiratory distress  Breath sounds: Normal breath sounds  No wheezing  Abdominal:      General: Bowel sounds are normal       Palpations: Abdomen is soft  Tenderness: There is abdominal tenderness  Positive signs include Hernandez's sign     Musculoskeletal: General: Normal range of motion  Cervical back: Normal range of motion  Skin:     General: Skin is warm and dry  Capillary Refill: Capillary refill takes less than 2 seconds  Neurological:      General: No focal deficit present  Mental Status: She is alert and oriented to person, place, and time     Psychiatric:         Mood and Affect: Mood normal           Christiano Gleason MD  [address]

## 2023-02-23 NOTE — ASSESSMENT & PLAN NOTE
Last visit, initiated Wellbutrin and advised nicotine gum, PFTs  Patient has not gotten PFT but has successfully abstained from cigarettes for the past month  Encouragement given, will continue to follow

## 2023-02-23 NOTE — ASSESSMENT & PLAN NOTE
Patient due for cervical cancer screening per guidelines, referral placed  Patient also has active order for mammogram, encouraged patient to follow through

## 2023-02-23 NOTE — PRE-PROCEDURE INSTRUCTIONS
Pre-Surgery Instructions:   Medication Instructions   • albuterol (PROVENTIL HFA,VENTOLIN HFA) 90 mcg/act inhaler Uses PRN- OK to take day of surgery   • atorvastatin (LIPITOR) 40 mg tablet Take day of surgery  • buPROPion (WELLBUTRIN XL) 150 mg 24 hr tablet Take day of surgery  • escitalopram (LEXAPRO) 10 mg tablet Take day of surgery  • fluticasone (FLONASE) 50 mcg/act nasal spray Take day of surgery  • hydrochlorothiazide (HYDRODIURIL) 25 mg tablet Hold day of surgery  • loratadine (CLARITIN) 10 mg tablet Take day of surgery  Covid screening negative as per patient  Fully vaccinated  Reviewed showering and medication instructions  Take medications morning of surgery with a small sip of water  Patient verbalized understanding  Advised NPO after MN and ASC will call with scheduled surgical time  Advised to notify surgeon's office for any illness prior to day of surgery

## 2023-02-26 DIAGNOSIS — F41.1 GENERALIZED ANXIETY DISORDER: ICD-10-CM

## 2023-02-27 ENCOUNTER — HOSPITAL ENCOUNTER (OUTPATIENT)
Facility: AMBULARY SURGERY CENTER | Age: 53
Setting detail: OUTPATIENT SURGERY
Discharge: HOME | End: 2023-02-27
Attending: OTOLARYNGOLOGY | Admitting: OTOLARYNGOLOGY

## 2023-02-27 ENCOUNTER — ANESTHESIA (OUTPATIENT)
Dept: PERIOP | Facility: AMBULARY SURGERY CENTER | Age: 53
End: 2023-02-27

## 2023-02-27 ENCOUNTER — ANESTHESIA EVENT (OUTPATIENT)
Dept: PERIOP | Facility: AMBULARY SURGERY CENTER | Age: 53
End: 2023-02-27

## 2023-02-27 VITALS
OXYGEN SATURATION: 96 % | RESPIRATION RATE: 20 BRPM | WEIGHT: 205 LBS | HEART RATE: 98 BPM | DIASTOLIC BLOOD PRESSURE: 67 MMHG | TEMPERATURE: 97.2 F | HEIGHT: 63 IN | SYSTOLIC BLOOD PRESSURE: 142 MMHG | BODY MASS INDEX: 36.32 KG/M2

## 2023-02-27 DIAGNOSIS — Z98.890 STATUS POST NASAL SURGERY: Primary | ICD-10-CM

## 2023-02-27 DIAGNOSIS — J34.3 HYPERTROPHY OF NASAL TURBINATES: ICD-10-CM

## 2023-02-27 DIAGNOSIS — J34.2 DEVIATED NASAL SEPTUM: ICD-10-CM

## 2023-02-27 PROBLEM — J34.89 NASAL OBSTRUCTION: Status: ACTIVE | Noted: 2023-02-27

## 2023-02-27 RX ORDER — EPHEDRINE SULFATE 50 MG/ML
INJECTION INTRAVENOUS AS NEEDED
Status: DISCONTINUED | OUTPATIENT
Start: 2023-02-27 | End: 2023-02-27

## 2023-02-27 RX ORDER — ONDANSETRON 2 MG/ML
INJECTION INTRAMUSCULAR; INTRAVENOUS AS NEEDED
Status: DISCONTINUED | OUTPATIENT
Start: 2023-02-27 | End: 2023-02-27

## 2023-02-27 RX ORDER — ROCURONIUM BROMIDE 10 MG/ML
INJECTION, SOLUTION INTRAVENOUS AS NEEDED
Status: DISCONTINUED | OUTPATIENT
Start: 2023-02-27 | End: 2023-02-27

## 2023-02-27 RX ORDER — HYDROCODONE BITARTRATE AND ACETAMINOPHEN 5; 325 MG/1; MG/1
1 TABLET ORAL EVERY 4 HOURS PRN
Qty: 20 TABLET | Refills: 0 | Status: SHIPPED | OUTPATIENT
Start: 2023-02-27 | End: 2023-03-09

## 2023-02-27 RX ORDER — LIDOCAINE HYDROCHLORIDE AND EPINEPHRINE 10; 10 MG/ML; UG/ML
INJECTION, SOLUTION INFILTRATION; PERINEURAL AS NEEDED
Status: DISCONTINUED | OUTPATIENT
Start: 2023-02-27 | End: 2023-02-27 | Stop reason: HOSPADM

## 2023-02-27 RX ORDER — CEFAZOLIN SODIUM 2 G/50ML
SOLUTION INTRAVENOUS AS NEEDED
Status: DISCONTINUED | OUTPATIENT
Start: 2023-02-27 | End: 2023-02-27

## 2023-02-27 RX ORDER — FENTANYL CITRATE 50 UG/ML
INJECTION, SOLUTION INTRAMUSCULAR; INTRAVENOUS AS NEEDED
Status: DISCONTINUED | OUTPATIENT
Start: 2023-02-27 | End: 2023-02-27

## 2023-02-27 RX ORDER — FENTANYL CITRATE/PF 50 MCG/ML
25 SYRINGE (ML) INJECTION
Status: DISCONTINUED | OUTPATIENT
Start: 2023-02-27 | End: 2023-02-27 | Stop reason: HOSPADM

## 2023-02-27 RX ORDER — HYDROCODONE BITARTRATE AND ACETAMINOPHEN 5; 325 MG/1; MG/1
2 TABLET ORAL EVERY 6 HOURS PRN
Status: DISCONTINUED | OUTPATIENT
Start: 2023-02-27 | End: 2023-02-27 | Stop reason: HOSPADM

## 2023-02-27 RX ORDER — DEXAMETHASONE SODIUM PHOSPHATE 10 MG/ML
INJECTION, SOLUTION INTRAMUSCULAR; INTRAVENOUS AS NEEDED
Status: DISCONTINUED | OUTPATIENT
Start: 2023-02-27 | End: 2023-02-27

## 2023-02-27 RX ORDER — PROPOFOL 10 MG/ML
INJECTION, EMULSION INTRAVENOUS AS NEEDED
Status: DISCONTINUED | OUTPATIENT
Start: 2023-02-27 | End: 2023-02-27

## 2023-02-27 RX ORDER — MIDAZOLAM HYDROCHLORIDE 2 MG/2ML
INJECTION, SOLUTION INTRAMUSCULAR; INTRAVENOUS AS NEEDED
Status: DISCONTINUED | OUTPATIENT
Start: 2023-02-27 | End: 2023-02-27

## 2023-02-27 RX ORDER — SODIUM CHLORIDE, SODIUM LACTATE, POTASSIUM CHLORIDE, CALCIUM CHLORIDE 600; 310; 30; 20 MG/100ML; MG/100ML; MG/100ML; MG/100ML
INJECTION, SOLUTION INTRAVENOUS CONTINUOUS PRN
Status: DISCONTINUED | OUTPATIENT
Start: 2023-02-27 | End: 2023-02-27

## 2023-02-27 RX ORDER — OXYMETAZOLINE HYDROCHLORIDE 0.05 G/100ML
SPRAY NASAL AS NEEDED
Status: DISCONTINUED | OUTPATIENT
Start: 2023-02-27 | End: 2023-02-27 | Stop reason: HOSPADM

## 2023-02-27 RX ORDER — SODIUM CHLORIDE/ALOE VERA
GEL (GRAM) NASAL AS NEEDED
Status: DISCONTINUED | OUTPATIENT
Start: 2023-02-27 | End: 2023-02-27 | Stop reason: HOSPADM

## 2023-02-27 RX ORDER — HYDROMORPHONE HCL/PF 1 MG/ML
0.2 SYRINGE (ML) INJECTION
Status: DISCONTINUED | OUTPATIENT
Start: 2023-02-27 | End: 2023-02-27 | Stop reason: HOSPADM

## 2023-02-27 RX ORDER — HYDRALAZINE HYDROCHLORIDE 20 MG/ML
INJECTION INTRAMUSCULAR; INTRAVENOUS AS NEEDED
Status: DISCONTINUED | OUTPATIENT
Start: 2023-02-27 | End: 2023-02-27

## 2023-02-27 RX ORDER — ESCITALOPRAM OXALATE 10 MG/1
TABLET ORAL
Qty: 90 TABLET | Refills: 1 | Status: SHIPPED | OUTPATIENT
Start: 2023-02-27

## 2023-02-27 RX ORDER — SODIUM CHLORIDE, SODIUM LACTATE, POTASSIUM CHLORIDE, CALCIUM CHLORIDE 600; 310; 30; 20 MG/100ML; MG/100ML; MG/100ML; MG/100ML
125 INJECTION, SOLUTION INTRAVENOUS CONTINUOUS
Status: DISCONTINUED | OUTPATIENT
Start: 2023-02-27 | End: 2023-02-27 | Stop reason: HOSPADM

## 2023-02-27 RX ORDER — MAGNESIUM HYDROXIDE 1200 MG/15ML
LIQUID ORAL AS NEEDED
Status: DISCONTINUED | OUTPATIENT
Start: 2023-02-27 | End: 2023-02-27 | Stop reason: HOSPADM

## 2023-02-27 RX ORDER — CEPHALEXIN 500 MG/1
500 CAPSULE ORAL EVERY 8 HOURS SCHEDULED
Qty: 30 CAPSULE | Refills: 0 | Status: SHIPPED | OUTPATIENT
Start: 2023-02-27 | End: 2023-03-09

## 2023-02-27 RX ORDER — ONDANSETRON 2 MG/ML
4 INJECTION INTRAMUSCULAR; INTRAVENOUS EVERY 6 HOURS PRN
Status: DISCONTINUED | OUTPATIENT
Start: 2023-02-27 | End: 2023-02-27 | Stop reason: HOSPADM

## 2023-02-27 RX ORDER — ONDANSETRON 2 MG/ML
4 INJECTION INTRAMUSCULAR; INTRAVENOUS ONCE AS NEEDED
Status: DISCONTINUED | OUTPATIENT
Start: 2023-02-27 | End: 2023-02-27 | Stop reason: HOSPADM

## 2023-02-27 RX ORDER — CEFAZOLIN SODIUM 2 G/50ML
2000 SOLUTION INTRAVENOUS ONCE
Status: DISCONTINUED | OUTPATIENT
Start: 2023-02-27 | End: 2023-02-27 | Stop reason: HOSPADM

## 2023-02-27 RX ADMIN — EPHEDRINE SULFATE 10 MG: 50 INJECTION, SOLUTION INTRAVENOUS at 11:23

## 2023-02-27 RX ADMIN — SODIUM CHLORIDE, SODIUM LACTATE, POTASSIUM CHLORIDE, AND CALCIUM CHLORIDE: .6; .31; .03; .02 INJECTION, SOLUTION INTRAVENOUS at 10:48

## 2023-02-27 RX ADMIN — ONDANSETRON 4 MG: 2 INJECTION INTRAMUSCULAR; INTRAVENOUS at 11:18

## 2023-02-27 RX ADMIN — FENTANYL CITRATE 50 MCG: 50 INJECTION, SOLUTION INTRAMUSCULAR; INTRAVENOUS at 12:20

## 2023-02-27 RX ADMIN — FENTANYL CITRATE 25 MCG: 50 INJECTION INTRAMUSCULAR; INTRAVENOUS at 12:37

## 2023-02-27 RX ADMIN — HYDROCODONE BITARTRATE AND ACETAMINOPHEN 2 TABLET: 5; 325 TABLET ORAL at 13:09

## 2023-02-27 RX ADMIN — PROPOFOL 200 MG: 10 INJECTION, EMULSION INTRAVENOUS at 11:07

## 2023-02-27 RX ADMIN — ROCURONIUM BROMIDE 50 MG: 10 INJECTION, SOLUTION INTRAVENOUS at 11:07

## 2023-02-27 RX ADMIN — DEXAMETHASONE SODIUM PHOSPHATE 10 MG: 10 INJECTION, SOLUTION INTRAMUSCULAR; INTRAVENOUS at 11:18

## 2023-02-27 RX ADMIN — SODIUM CHLORIDE, SODIUM LACTATE, POTASSIUM CHLORIDE, AND CALCIUM CHLORIDE: .6; .31; .03; .02 INJECTION, SOLUTION INTRAVENOUS at 12:27

## 2023-02-27 RX ADMIN — CEFAZOLIN SODIUM 2000 MG: 2 SOLUTION INTRAVENOUS at 11:16

## 2023-02-27 RX ADMIN — FENTANYL CITRATE 25 MCG: 50 INJECTION INTRAMUSCULAR; INTRAVENOUS at 12:42

## 2023-02-27 RX ADMIN — MIDAZOLAM HYDROCHLORIDE 2 MG: 1 INJECTION, SOLUTION INTRAMUSCULAR; INTRAVENOUS at 11:04

## 2023-02-27 RX ADMIN — SUGAMMADEX 200 MG: 100 INJECTION, SOLUTION INTRAVENOUS at 12:17

## 2023-02-27 RX ADMIN — FENTANYL CITRATE 50 MCG: 50 INJECTION, SOLUTION INTRAMUSCULAR; INTRAVENOUS at 11:07

## 2023-02-27 RX ADMIN — HYDRALAZINE HYDROCHLORIDE 10 MG: 20 INJECTION, SOLUTION INTRAMUSCULAR; INTRAVENOUS at 11:39

## 2023-02-27 RX ADMIN — EPHEDRINE SULFATE 10 MG: 50 INJECTION, SOLUTION INTRAVENOUS at 11:22

## 2023-02-27 RX ADMIN — HYDRALAZINE HYDROCHLORIDE 10 MG: 20 INJECTION, SOLUTION INTRAMUSCULAR; INTRAVENOUS at 11:47

## 2023-02-27 NOTE — ANESTHESIA PREPROCEDURE EVALUATION
Procedure:  SEPTOPLASTY, TURBINOPLASTY, POSSIBLE LEFT  GRAFT (Nose)  TURBINOPLASTY (Nose)  POSSIBLE LEFT  GRAFT (Left: Nose)    Relevant Problems   CARDIO   (+) Essential hypertension      ENDO   (+) Subclinical hypothyroidism      GI/HEPATIC   (+) GERD (gastroesophageal reflux disease)      MUSCULOSKELETAL   (+) Chronic midline low back pain without sciatica      NEURO/PSYCH   (+) Anxiety disorder   (+) Chronic midline low back pain without sciatica   (+) Depression   (+) History of fracture of nose      PULMONARY   (+) Smoking        Physical Exam    Airway    Mallampati score: III  TM Distance: >3 FB  Neck ROM: full     Dental       Cardiovascular      Pulmonary      Other Findings        Anesthesia Plan  ASA Score- 3     Anesthesia Type- general with ASA Monitors  Additional Monitors:   Airway Plan: ETT  Plan Factors-Exercise tolerance (METS): >4 METS  Chart reviewed  Patient is not a current smoker  Patient did not smoke on day of surgery  Obstructive sleep apnea risk education given perioperatively  Induction- intravenous  Postoperative Plan- Plan for postoperative opioid use  Planned trial extubation    Informed Consent- Anesthetic plan and risks discussed with patient  I personally reviewed this patient with the CRNA  Discussed and agreed on the Anesthesia Plan with the CRNA  [facility] Anesthesia plan and consent discussed with Miguel Samuel who expressed understanding and agreement  Risks/benefits and alternatives discussed with patient including possible PONV, sore throat, damage to teeth/lips/gums and possibility of rare anesthetic and surgical emergencies

## 2023-02-27 NOTE — INTERVAL H&P NOTE
H&P reviewed  After examining the patient I find no changes in the patients condition since the H&P had been written      Vitals:    02/27/23 0914   BP: (!) 172/99   Pulse: 77   Resp: 18   Temp: 97 7 °F (36 5 °C)   SpO2: 96%

## 2023-02-27 NOTE — ANESTHESIA POSTPROCEDURE EVALUATION
Post-Op Assessment Note    CV Status:  Stable  Pain Score: 0    Pain management: adequate     Mental Status:  Alert and awake   Hydration Status:  Euvolemic   PONV Controlled:  Controlled   Airway Patency:  Patent      Post Op Vitals Reviewed: Yes      Staff: CRNA         No notable events documented      BP  162/74   Temp   97 1   Pulse  98   Resp   18   SpO2   99

## 2023-02-27 NOTE — DISCHARGE INSTRUCTIONS
- Some drainage from nostrils over the next few days is expected  Please keep moustache dressing in place until nasal drainage stops  Replace gauze when saturated  - If you must cough or sneeze, please do so with your mouth open  -No blowing of nose until MD says ok  -Please minimize bending over, lifting, and strenuous activity     -Please sleep with head elevated

## 2023-02-27 NOTE — OP NOTE
OPERATIVE REPORT  PATIENT NAME: Maryanne Casas    :  1970  MRN: [id number]  Pt Location: [facility]    SURGERY DATE: 2023    Surgeon(s) and Role:     Ashley Plasencia MD - Primary    Preop Diagnosis:  Nasal obstruction [J34 89]  Deviated nasal septum [J34 2]  Hypertrophy of nasal turbinates [J34 3]    Post-Op Diagnosis Codes:     * Nasal obstruction [J34 89]     * Deviated nasal septum [J34 2]     * Hypertrophy of nasal turbinates [J34 3]    Procedure(s):  TURBINOPLASTY  SEPTOPLASTY    Specimen(s):  * No specimens in log *    Estimated Blood Loss:   Minimal    Drains:  * No LDAs found *    Anesthesia Type:   General    Operative Indications:  Nasal obstruction [J34 89]  Deviated nasal septum [J34 2]  Hypertrophy of nasal turbinates [J34 3]      Operative Findings:  Severely deviated nasal septum to the left causing complete obstruction of the nasal cavity and nasal valve  Hypertrophy of turbinates particularly right middle and inferior turbinate  Complications:   None    Procedure and Technique:  Patient was met in holding area, positively identified and risks, benefits and alternatives discussed, Patient confirmed informed consent  Oyxmetazoline nasal spray was applied to bilateral nasal cavities  The patient was transferred onto the operating table in the supine position  Appropriate monitoring devices were put in place, general anesthesia was administered by anesthesiologist and patient intubated without complications, tube taped in midline  The patient was prepped and draped in the usual clean fashion  Before proceeding further, a time-out was taken during which the patient’s identification and planned surgical procedure were confirmed  Examination with a speculum confirmed severe obstruction bilaterally, topical oxymetazoline applied with cotton pledgets and 1% lidocaine with 1/100,000 epinephrine was injected to the septum on  right caudal edge   Septoplasty hemitransfixion incision along the caudal margin of the septum on the right  side was performed with the 15 blade  Mucoperichondrial and mucoperiosteal flaps were elevated with the iris scissors and freer elevator bilaterally, mucoperiosteal  flap was easily elevated posteriorly with freer, basal deviated cartilage was resected with freer  The bony spur posteriorly was resected by cutting above and below with septal Hurtado scissors and removing septal posterior wedge with Blaesly forceps  The inferior deviated edge of perpendicular plate of ethmoid at chondroethmoidal junction was resected with Jantzen-Mendoza rongeurs  Caudal edge of septum excised as needed to correct the caudal luxation  Nasal crest was repositioned with a greenstick fracture using a curved hemostat forceps  Inferior turbinates were reduced with coblation needle and outfracturing them  Bilateral nasal cavities were then re-examined, and the longest nasal speculum could be passed back along the septum on both sides without meeting any resistance  The septoplasty incision was closed using  4-0 chromic stitches  The mucoperichondral flaps were sutured with mattress type 4/0 plain gut with small Lisandro needle  Middle turbinates were also medialized with a 4/0 plain gut stitch,  Silicone septal splints were then placed and sutured with 3/0 Prolene  All counts were correct at the end of the case, and no complications were encountered  The nasopharynx was then suctioned free of blood and secretions  Patient was handed to the anesthesiologist who weaned from anesthesia, and extubated patient  Patient was awakened, and taken to the recovery room in stable condition       I was present for the entire procedure    Patient Disposition:  PACU         SIGNATURE: Mónica Miranda MD  DATE: February 27, 2023  TIME: 12:31 PM

## 2023-03-08 ENCOUNTER — HOSPITAL ENCOUNTER (OUTPATIENT)
Dept: PULMONOLOGY | Facility: HOSPITAL | Age: 53
Discharge: HOME | End: 2023-03-08

## 2023-03-08 RX ORDER — ALBUTEROL SULFATE 2.5 MG/3ML
2.5 SOLUTION RESPIRATORY (INHALATION) ONCE
Status: DISCONTINUED | OUTPATIENT
Start: 2023-03-08 | End: 2023-03-12 | Stop reason: HOSPADM

## 2023-03-09 DIAGNOSIS — H69.82 DYSFUNCTION OF LEFT EUSTACHIAN TUBE: ICD-10-CM

## 2023-03-09 DIAGNOSIS — J30.9 ALLERGIC RHINITIS, UNSPECIFIED SEASONALITY, UNSPECIFIED TRIGGER: ICD-10-CM

## 2023-03-09 DIAGNOSIS — H93.8X2 EAR PRESSURE, LEFT: ICD-10-CM

## 2023-03-09 RX ORDER — LORATADINE 10 MG/1
TABLET ORAL
Qty: 90 TABLET | Refills: 1 | Status: SHIPPED | OUTPATIENT
Start: 2023-03-09

## 2023-03-22 ENCOUNTER — HOSPITAL ENCOUNTER (OUTPATIENT)
Dept: PULMONOLOGY | Facility: HOSPITAL | Age: 53
Discharge: HOME | End: 2023-03-22

## 2023-03-22 DIAGNOSIS — F17.200 CURRENT EVERY DAY SMOKER: ICD-10-CM

## 2023-03-22 RX ORDER — ALBUTEROL SULFATE 2.5 MG/3ML
2.5 SOLUTION RESPIRATORY (INHALATION) ONCE
Status: COMPLETED | OUTPATIENT
Start: 2023-03-22 | End: 2023-03-22

## 2023-03-22 RX ADMIN — ALBUTEROL SULFATE 2.5 MG: 2.5 SOLUTION RESPIRATORY (INHALATION) at 17:43

## 2023-03-27 ENCOUNTER — OFFICE VISIT (OUTPATIENT)
Dept: FAMILY MEDICINE CLINIC | Facility: CLINIC | Age: 53
End: 2023-03-27

## 2023-03-27 VITALS — RESPIRATION RATE: 18 BRPM | HEART RATE: 91 BPM | TEMPERATURE: 97.3 F | OXYGEN SATURATION: 98 %

## 2023-03-27 DIAGNOSIS — J02.9 SORE THROAT: Primary | ICD-10-CM

## 2023-03-27 RX ORDER — GUAIFENESIN 200 MG/10ML
200 LIQUID ORAL 3 TIMES DAILY PRN
Qty: 120 ML | Refills: 0 | Status: SHIPPED | OUTPATIENT
Start: 2023-03-27

## 2023-03-27 NOTE — ASSESSMENT & PLAN NOTE
- sore throat and productive cough x 6 days  - went to [facility] ER yesterday - had a negative CXR and negative rapid strep and f/u culture  - suspect viral illness  - recommend supportive care with warm fluids, honey, cough drops, salt water gargle, etc  - rx guaifenesin liquid TID x 7 days   - explained that post-viral cough can take about 2-8 weeks to improve and to continue to supportive care  - ED/return precautions reviewed

## 2023-03-27 NOTE — PROGRESS NOTES
Name: Kristi Bennett      : 1970      MRN: [id number]  Encounter Provider: Sofia Ashley DO  Encounter Date: 3/27/2023   Encounter department: [address] [place] [address]  Sore throat  Assessment & Plan:  - sore throat and productive cough x 6 days  - went to [facility] ER yesterday - had a negative CXR and negative rapid strep and f/u culture  - suspect viral illness  - recommend supportive care with warm fluids, honey, cough drops, salt water gargle, etc  - rx guaifenesin liquid TID x 7 days   - explained that post-viral cough can take about 2-8 weeks to improve and to continue to supportive care  - ED/return precautions reviewed    Orders:  -     guaiFENesin (ROBITUSSIN) 100 MG/5ML oral liquid; Take 10 mL (200 mg total) by mouth 3 (three) times a day as needed for cough         Subjective      47 y/o F presents with sore throat for 6 days  Associated with productive cough  No fevers  Followed with [facility] ER yesterday where she got a chest x-ray that was negative for any infiltrates with a negative rapid strep and follow up culture  Her appetite has been fine but has not been eating today in preparation for her colonoscopy tomorrow  She presents today for a second opinion  Review of Systems   Constitutional: Negative for chills and fever  HENT: Positive for sore throat  Negative for congestion and rhinorrhea  Eyes: Negative for visual disturbance  Respiratory: Positive for cough  Negative for shortness of breath  Cardiovascular: Negative for chest pain and palpitations  Gastrointestinal: Negative for abdominal pain, constipation, diarrhea, nausea and vomiting  Genitourinary: Negative for dysuria  Musculoskeletal: Negative for arthralgias  Skin: Negative for rash  Neurological: Negative for dizziness, light-headedness and headaches         Current Outpatient Medications on File Prior to Visit   Medication Sig   • polyethylene glycol (GOLYTELY) 4000 mL solution Take 4,000 mL by mouth once for 1 dose Take as directed by office   • atorvastatin (LIPITOR) 40 mg tablet TAKE 1 TABLET BY MOUTH EVERY DAY   • buPROPion (WELLBUTRIN XL) 150 mg 24 hr tablet TAKE 1 TABLET BY MOUTH EVERY DAY IN THE MORNING   • escitalopram (LEXAPRO) 10 mg tablet TAKE 1 TABLET BY MOUTH EVERY DAY   • hydrochlorothiazide (HYDRODIURIL) 25 mg tablet TAKE 1 TABLET BY MOUTH EVERY DAY   • loratadine (CLARITIN) 10 mg tablet TAKE 1 TABLET BY MOUTH EVERY DAY   • oxyCODONE (Roxicodone) 5 immediate release tablet Take 1 tablet (5 mg total) by mouth every 4 (four) hours as needed for moderate pain Max Daily Amount: 30 mg   • sodium chloride (OCEAN) 0 65 % nasal spray 2 sprays into each nostril every 2 (two) hours while awake       Objective     Pulse 91   Temp (!) 97 3 °F (36 3 °C) (Temporal)   Resp 18   LMP 01/01/2016   SpO2 98%     Physical Exam  Vitals reviewed  Constitutional:       General: She is not in acute distress  Appearance: Normal appearance  HENT:      Head: Normocephalic and atraumatic  Right Ear: External ear normal       Left Ear: External ear normal       Nose: Nose normal       Mouth/Throat:      Pharynx: Oropharynx is clear  Posterior oropharyngeal erythema (mild) present  No oropharyngeal exudate  Eyes:      General:         Right eye: No discharge  Left eye: No discharge  Extraocular Movements: Extraocular movements intact  Conjunctiva/sclera: Conjunctivae normal    Cardiovascular:      Rate and Rhythm: Normal rate and regular rhythm  Pulses: Normal pulses  Heart sounds: Normal heart sounds  Pulmonary:      Effort: Pulmonary effort is normal       Breath sounds: Normal breath sounds  Abdominal:      Palpations: Abdomen is soft  Musculoskeletal:      Cervical back: Neck supple  Right lower leg: No edema  Left lower leg: No edema  Skin:     General: Skin is warm        Capillary Refill: Capillary refill takes less than 2 seconds  Neurological:      Mental Status: She is alert and oriented to person, place, and time  Gait: Gait normal    Psychiatric:         Mood and Affect: Mood normal          Behavior: Behavior normal          Thought Content:  Thought content normal          Judgment: Judgment normal        Benjamín Daniels, DO

## 2023-03-28 ENCOUNTER — ANESTHESIA EVENT (OUTPATIENT)
Dept: GASTROENTEROLOGY | Facility: AMBULARY SURGERY CENTER | Age: 53
End: 2023-03-28

## 2023-03-28 ENCOUNTER — HOSPITAL ENCOUNTER (OUTPATIENT)
Dept: GASTROENTEROLOGY | Facility: AMBULARY SURGERY CENTER | Age: 53
Setting detail: OUTPATIENT SURGERY
Discharge: HOME | End: 2023-03-28
Attending: INTERNAL MEDICINE

## 2023-03-28 ENCOUNTER — ANESTHESIA (OUTPATIENT)
Dept: GASTROENTEROLOGY | Facility: AMBULARY SURGERY CENTER | Age: 53
End: 2023-03-28

## 2023-03-28 VITALS
OXYGEN SATURATION: 98 % | HEART RATE: 77 BPM | SYSTOLIC BLOOD PRESSURE: 134 MMHG | DIASTOLIC BLOOD PRESSURE: 70 MMHG | RESPIRATION RATE: 20 BRPM | TEMPERATURE: 96.6 F

## 2023-03-28 DIAGNOSIS — J45.21 MILD INTERMITTENT ASTHMATIC BRONCHITIS WITH ACUTE EXACERBATION: ICD-10-CM

## 2023-03-28 DIAGNOSIS — Z12.11 ENCOUNTER FOR SCREENING COLONOSCOPY: ICD-10-CM

## 2023-03-28 RX ORDER — ALBUTEROL SULFATE 90 UG/1
AEROSOL, METERED RESPIRATORY (INHALATION)
Qty: 18 G | Refills: 1 | Status: SHIPPED | OUTPATIENT
Start: 2023-03-28

## 2023-03-28 RX ORDER — PROPOFOL 10 MG/ML
INJECTION, EMULSION INTRAVENOUS AS NEEDED
Status: DISCONTINUED | OUTPATIENT
Start: 2023-03-28 | End: 2023-03-28

## 2023-03-28 RX ORDER — PROPOFOL 10 MG/ML
INJECTION, EMULSION INTRAVENOUS CONTINUOUS PRN
Status: DISCONTINUED | OUTPATIENT
Start: 2023-03-28 | End: 2023-03-28

## 2023-03-28 RX ORDER — LIDOCAINE HYDROCHLORIDE 10 MG/ML
INJECTION, SOLUTION EPIDURAL; INFILTRATION; INTRACAUDAL; PERINEURAL AS NEEDED
Status: DISCONTINUED | OUTPATIENT
Start: 2023-03-28 | End: 2023-03-28

## 2023-03-28 RX ORDER — SODIUM CHLORIDE, SODIUM LACTATE, POTASSIUM CHLORIDE, CALCIUM CHLORIDE 600; 310; 30; 20 MG/100ML; MG/100ML; MG/100ML; MG/100ML
INJECTION, SOLUTION INTRAVENOUS CONTINUOUS PRN
Status: DISCONTINUED | OUTPATIENT
Start: 2023-03-28 | End: 2023-03-28

## 2023-03-28 RX ADMIN — Medication 40 MG: at 12:40

## 2023-03-28 RX ADMIN — LIDOCAINE HYDROCHLORIDE 50 MG: 10 INJECTION, SOLUTION EPIDURAL; INFILTRATION; INTRACAUDAL; PERINEURAL at 12:37

## 2023-03-28 RX ADMIN — PROPOFOL 100 MCG/KG/MIN: 10 INJECTION, EMULSION INTRAVENOUS at 12:37

## 2023-03-28 RX ADMIN — PROPOFOL 100 MG: 10 INJECTION, EMULSION INTRAVENOUS at 12:37

## 2023-03-28 RX ADMIN — SODIUM CHLORIDE, SODIUM LACTATE, POTASSIUM CHLORIDE, AND CALCIUM CHLORIDE: .6; .31; .03; .02 INJECTION, SOLUTION INTRAVENOUS at 12:28

## 2023-03-28 NOTE — ANESTHESIA PREPROCEDURE EVALUATION
Procedure:  COLONOSCOPY    Relevant Problems   CARDIO   (+) Essential hypertension      ENDO   (+) Subclinical hypothyroidism      GI/HEPATIC   (+) GERD (gastroesophageal reflux disease)      MUSCULOSKELETAL   (+) Chronic midline low back pain without sciatica      NEURO/PSYCH   (+) Anxiety disorder   (+) Chronic midline low back pain without sciatica   (+) Depression   (+) History of fracture of nose      PULMONARY   (+) Smoking        Physical Exam    Airway    Mallampati score: III  TM Distance: >3 FB  Neck ROM: full     Dental   No notable dental hx     Cardiovascular  Cardiovascular exam normal    Pulmonary  Pulmonary exam normal     Other Findings    Pt has lingering cough from viral illness but says it is much better than it was several days ago  Denies fever/chills/sore throat/sinus congestion  Lungs CTA    Anesthesia Plan  ASA Score- 2     Anesthesia Type- IV sedation with anesthesia with ASA Monitors  Additional Monitors:   Airway Plan:           Plan Factors-Exercise tolerance (METS): >4 METS  Chart reviewed  Patient summary reviewed  Patient is not a current smoker  Induction- intravenous  Postoperative Plan-     Informed Consent- Anesthetic plan and risks discussed with patient

## 2023-03-28 NOTE — TELEPHONE ENCOUNTER
Good Morning Dr Ernst Moran  Patient was here yesterday for Sore Throat  Please review if is appropriate if we can refill medication   Thanks

## 2023-03-28 NOTE — ANESTHESIA POSTPROCEDURE EVALUATION
Post-Op Assessment Note    CV Status:  Stable  Pain Score: 0    Pain management: adequate     Mental Status:  Alert and awake   Hydration Status:  Euvolemic   PONV Controlled:  Controlled   Airway Patency:  Patent   Two or more mitigation strategies used for obstructive sleep apnea   Post Op Vitals Reviewed: Yes      Staff: CRNA         No notable events documented      /59 (03/28/23 1258)    Temp (!) 96 6 °F (35 9 °C) (03/28/23 1258)    Pulse 91 (03/28/23 1258)   Resp 16 (03/28/23 1258)    SpO2 97 % (03/28/23 1258)

## 2023-03-28 NOTE — H&P
History and Physical - [facility]  Alex Yang 48 y o  female MRN: [id number]    HPI: Alex Yang is a 48y o  year old female who presents for screening colonoscopy      Review of Systems    Historical Information   Past Medical History:   Diagnosis Date   • Allergic Uk   • Anxiety    • Arthritis 2018    Back   • Depression 2019   • Discharge from right nipple     last assessed 2013   • GERD (gastroesophageal reflux disease) 2020   • Hyperlipidemia    • Hypertension    • Irritable bowel syndrome    • Loose stools 2018   • Subclinical hypothyroidism 2019     Past Surgical History:   Procedure Laterality Date   • CARPAL TUNNEL RELEASE     • AR HYSTEROSCOPY BX ENDOMETRIUM&/POLYPC W/WO D&C N/A 2018    Procedure: DILATATION AND CURETTAGE (D&C) WITH HYSTEROSCOPY;  Surgeon: Pedrito Santiago DO;  [facility]: [facility];  Service: Gynecology   • AR SEPTOPLASTY/SUBMUCOUS RESECJ W/WO CARTILAGE GRF  2023    Procedure: SEPTOPLASTY;  Surgeon: Shaggy Randle MD;  Location: [facility];  Service: ENT   • AR SUBMUCOUS [address] PRTL/COMPL N/A 2023    Procedure: TURBINOPLASTY;  Surgeon: Shaggy Randle MD;  [facility];  Service: ENT   • TUBAL LIGATION     • WRIST SURGERY Bilateral      Social History   Social History     Substance and Sexual Activity   Alcohol Use No     Social History     Substance and Sexual Activity   Drug Use Never     Social History     Tobacco Use   Smoking Status Former   • Packs/day: 0 50   • Years: 35 00   • Pack years: 17 50   • Types: Cigarettes   • Quit date: 2023   • Years since quittin 1   Smokeless Tobacco Never   Tobacco Comments    Already in a program     Family History   Problem Relation Age of Onset   • Heart attack Mother    • Diabetes Father    • Hypertension Father    • Hyperlipidemia Father    • Breast cancer Maternal Grandmother    • Asthma Sister    • Fibromyalgia Sister    • No Known Problems Brother    • Depression Daughter    • No Known Problems Maternal Grandfather    • ADD / ADHD Paternal Grandmother    • No Known Problems Sister    • No Known Problems Sister    • No Known Problems Brother    • Mental retardation Brother    • Cerebral palsy Brother    • ADD / ADHD Daughter    • Bipolar disorder Daughter        Meds/Allergies     (Not in a hospital admission)      Allergies   Allergen Reactions   • Bee Pollen      Other reaction(s):  Allergic Rhinitis, Sneezing   • Pollen Extract Allergic Rhinitis and Sneezing       Objective     /69 (BP Location: Right arm)   Pulse 93   Temp (!) 97 °F (36 1 °C) (Temporal)   Resp 18   LMP 01/01/2016       PHYSICAL EXAM    Gen: NAD  CV: RRR  CHEST: Clear  ABD: soft, NT/ND  EXT: no edema  Neuro: AAO      ASSESSMENT/PLAN:  This is a 48y o  year old female here for screening colonoscopy    PLAN:   Procedure: Colonoscopy with biopsy and possible polypectomy

## 2023-04-03 ENCOUNTER — HOSPITAL ENCOUNTER (OUTPATIENT)
Dept: BONE DENSITY | Facility: CLINIC | Age: 53
Discharge: HOME | End: 2023-04-03

## 2023-04-03 DIAGNOSIS — Z78.0 ENCOUNTER FOR OSTEOPOROSIS SCREENING IN ASYMPTOMATIC POSTMENOPAUSAL PATIENT: ICD-10-CM

## 2023-04-03 DIAGNOSIS — Z13.820 ENCOUNTER FOR OSTEOPOROSIS SCREENING IN ASYMPTOMATIC POSTMENOPAUSAL PATIENT: ICD-10-CM

## 2023-04-23 PROBLEM — Z12.4 SCREENING FOR CERVICAL CANCER: Status: RESOLVED | Noted: 2023-02-22 | Resolved: 2023-04-23

## 2023-04-23 PROBLEM — Z13.820 ENCOUNTER FOR OSTEOPOROSIS SCREENING IN ASYMPTOMATIC POSTMENOPAUSAL PATIENT: Status: RESOLVED | Noted: 2023-02-22 | Resolved: 2023-04-23

## 2023-04-23 PROBLEM — Z78.0 ENCOUNTER FOR OSTEOPOROSIS SCREENING IN ASYMPTOMATIC POSTMENOPAUSAL PATIENT: Status: RESOLVED | Noted: 2023-02-22 | Resolved: 2023-04-23

## 2023-05-24 DIAGNOSIS — I10 ESSENTIAL HYPERTENSION: ICD-10-CM

## 2023-05-24 RX ORDER — HYDROCHLOROTHIAZIDE 25 MG/1
TABLET ORAL
Qty: 90 TABLET | Refills: 1 | Status: SHIPPED | OUTPATIENT
Start: 2023-05-24

## 2023-05-31 ENCOUNTER — TELEPHONE (OUTPATIENT)
Dept: FAMILY MEDICINE CLINIC | Facility: CLINIC | Age: 53
End: 2023-05-31

## 2023-05-31 NOTE — LETTER
"    [address] [place] [address]  [address] [facility] [address]  Phone#  [phone]  Fax#  [phone]    May 31, 2023    Lukasmaynor Andrzej  1970  [place] [address]  [phone]    Dear Susana Arora had 1st no-show appointments at [address] [place] [address]  After each no-show, the office will send a letter warning you that excessive no-shows may result in your dismissal as a patient from the [address] [place] [address]    An appointment is considered a \"no-show\" when a patient does not arrive for their scheduled appointment and has not called the practice at least two (2) hours before their scheduled appointment to either reschedule or cancel the appointment  A \"no-show\" can also occur when an appointment is canceled due to the patient arriving fifteen (15) minutes or more past the scheduled time of their appointment  In accordance with the [facility]'s \"No-Show and Patient Dismissal Policy\", a patient may be dismissed from any [facility], at the discretion of the Practice Administrator and Medical Leadership, after a patient accumulates three (3) consecutive no-show appointments  In the future, we request that you call the office at least two (2) hours before your scheduled appointment to reschedule or cancel the appointment and avoid another no-show  If you need assistance with keeping scheduled appointments for any reason, speak to a member of your care team  [facility] has dedicated personnel that can connect you with resources       In order to assure quality and comprehensive care in a timely manner for all patients, to meet the needs of our patient population, and assist in any possible appropriate scheduling and receipt of care, the [facility] practices encourage all patients to arrive fifteen (15) minutes prior to their " scheduled appointment      Respectfully,   Practice Administrator

## 2023-06-08 DIAGNOSIS — J45.21 MILD INTERMITTENT ASTHMATIC BRONCHITIS WITH ACUTE EXACERBATION: ICD-10-CM

## 2023-06-08 RX ORDER — ALBUTEROL SULFATE 90 UG/1
AEROSOL, METERED RESPIRATORY (INHALATION)
Qty: 18 G | Refills: 1 | Status: SHIPPED | OUTPATIENT
Start: 2023-06-08

## 2023-06-20 NOTE — PROGRESS NOTES
Assessment/Plan:    Acute otitis externa of right ear  Acute, new-onset  Will prescribe ciprodex   Advised on warning signs and symptoms along with ER precautions  RTC if symptoms worsen or do not resolve       Diagnoses and all orders for this visit:    Acute otitis externa of right ear, unspecified type  -     ciprofloxacin-dexamethasone (CIPRODEX) otic suspension; Administer 4 drops to the right ear 2 (two) times a day          Subjective:      Patient ID: Elyn Goodpasture is a 46 y o  female  Patient is presenting today for a 1 day history of right sided facial swelling, right ear pain  She states that she has also noticed possible tongue swelling and that it feels "off"  She states that she woke up and noticed the swelling on her face and that her ear pain came after  She denies new medication, food and notes that her only allergy is to bee pollen  She otherwise denies ear drainage, difficulty swallowing, fever/chill, nausea/vomiting, shortness of breath, chest tightness  Review of Systems   Constitutional: Negative for activity change, appetite change, chills, fatigue and fever  HENT: Positive for ear pain and facial swelling (Right side)  Negative for congestion, dental problem, ear discharge, sinus pressure, sinus pain, sore throat and trouble swallowing  Eyes: Negative for pain and visual disturbance  Respiratory: Negative for cough, chest tightness and shortness of breath  Skin: Negative for color change and pallor  Neurological: Negative for syncope, light-headedness and headaches  Objective:      /82 (BP Location: Left arm, Patient Position: Sitting, Cuff Size: Large)   Pulse 105   Temp 98 3 °F (36 8 °C) (Temporal)   Resp 18   Ht 5' 3" (1 6 m)   Wt 101 kg (223 lb)   LMP 01/01/2016   SpO2 98%   BMI 39 50 kg/m²          Physical Exam  Constitutional:       Appearance: Normal appearance  HENT:      Head: Normocephalic and atraumatic        Comments: Mild R-sided facial swelling     Right Ear: External ear normal       Left Ear: Tympanic membrane, ear canal and external ear normal       Ears:      Comments: R ear exam limited due to significant pain      Nose: Nose normal       Mouth/Throat:      Mouth: Mucous membranes are moist       Pharynx: Oropharynx is clear  No oropharyngeal exudate or posterior oropharyngeal erythema  Eyes:      Conjunctiva/sclera: Conjunctivae normal    Neck:      Musculoskeletal: Normal range of motion and neck supple  No neck rigidity or muscular tenderness  Pulmonary:      Effort: Pulmonary effort is normal  No respiratory distress  Lymphadenopathy:      Cervical: No cervical adenopathy  Skin:     General: Skin is warm  Neurological:      Mental Status: She is alert  Mental status is at baseline     Psychiatric:         Mood and Affect: Mood normal          Behavior: Behavior normal  yes

## 2023-08-06 DIAGNOSIS — F17.200 CURRENT EVERY DAY SMOKER: ICD-10-CM

## 2023-08-07 RX ORDER — BUPROPION HYDROCHLORIDE 150 MG/1
TABLET ORAL
Qty: 90 TABLET | Refills: 1 | Status: SHIPPED | OUTPATIENT
Start: 2023-08-07

## 2023-08-30 DIAGNOSIS — F41.1 GENERALIZED ANXIETY DISORDER: ICD-10-CM

## 2023-08-30 RX ORDER — ESCITALOPRAM OXALATE 10 MG/1
TABLET ORAL
Qty: 90 TABLET | Refills: 1 | Status: SHIPPED | OUTPATIENT
Start: 2023-08-30

## 2023-09-27 ENCOUNTER — VBI (OUTPATIENT)
Dept: ADMINISTRATIVE | Facility: OTHER | Age: 53
End: 2023-09-27

## 2023-10-25 DIAGNOSIS — E78.5 DYSLIPIDEMIA: ICD-10-CM

## 2023-10-25 RX ORDER — ATORVASTATIN CALCIUM 40 MG/1
TABLET, FILM COATED ORAL
Qty: 90 TABLET | Refills: 1 | Status: SHIPPED | OUTPATIENT
Start: 2023-10-25

## 2023-11-16 DIAGNOSIS — I10 ESSENTIAL HYPERTENSION: ICD-10-CM

## 2023-11-16 RX ORDER — HYDROCHLOROTHIAZIDE 25 MG/1
TABLET ORAL
Qty: 90 TABLET | Refills: 1 | Status: SHIPPED | OUTPATIENT
Start: 2023-11-16

## 2024-05-01 DIAGNOSIS — E78.5 DYSLIPIDEMIA: ICD-10-CM

## 2024-05-01 DIAGNOSIS — F17.200 CURRENT EVERY DAY SMOKER: ICD-10-CM

## 2024-05-01 DIAGNOSIS — F41.1 GENERALIZED ANXIETY DISORDER: ICD-10-CM

## 2024-05-02 RX ORDER — BUPROPION HYDROCHLORIDE 150 MG/1
TABLET ORAL
Qty: 90 TABLET | Refills: 1 | Status: SHIPPED | OUTPATIENT
Start: 2024-05-02

## 2024-05-02 RX ORDER — ATORVASTATIN CALCIUM 40 MG/1
TABLET, FILM COATED ORAL
Qty: 90 TABLET | Refills: 1 | Status: SHIPPED | OUTPATIENT
Start: 2024-05-02

## 2024-05-02 RX ORDER — ESCITALOPRAM OXALATE 10 MG/1
TABLET ORAL
Qty: 90 TABLET | Refills: 1 | Status: SHIPPED | OUTPATIENT
Start: 2024-05-02

## 2024-07-17 DIAGNOSIS — I10 ESSENTIAL HYPERTENSION: ICD-10-CM

## 2024-07-17 RX ORDER — HYDROCHLOROTHIAZIDE 25 MG/1
25 TABLET ORAL DAILY
Qty: 90 TABLET | Refills: 1 | Status: SHIPPED | OUTPATIENT
Start: 2024-07-17

## 2024-07-25 ENCOUNTER — VBI (OUTPATIENT)
Dept: ADMINISTRATIVE | Facility: OTHER | Age: 54
End: 2024-07-25

## 2024-07-25 NOTE — TELEPHONE ENCOUNTER
07/25/24 2:12 PM     Chart reviewed for Pap Smear (HPV) aka Cervical Cancer Screening ; nothing is submitted to the patient's insurance at this time.     Anita De La O   [facility]

## 2024-09-23 ENCOUNTER — VBI (OUTPATIENT)
Dept: ADMINISTRATIVE | Facility: OTHER | Age: 54
End: 2024-09-23

## 2024-09-23 NOTE — TELEPHONE ENCOUNTER
09/23/24 2:12 PM     Chart reviewed for Mammogram ; nothing is submitted to the patient's insurance at this time.     Anita De La O MA   [facility]

## 2024-09-24 ENCOUNTER — OFFICE VISIT (OUTPATIENT)
Dept: URGENT CARE | Age: 54
End: 2024-09-24
Payer: COMMERCIAL

## 2024-09-24 VITALS
RESPIRATION RATE: 18 BRPM | DIASTOLIC BLOOD PRESSURE: 96 MMHG | BODY MASS INDEX: 39.68 KG/M2 | HEART RATE: 85 BPM | WEIGHT: 224 LBS | TEMPERATURE: 98.9 F | SYSTOLIC BLOOD PRESSURE: 158 MMHG | OXYGEN SATURATION: 94 %

## 2024-09-24 DIAGNOSIS — H92.01 EAR PAIN, RIGHT: Primary | ICD-10-CM

## 2024-09-24 PROCEDURE — 99213 OFFICE O/P EST LOW 20 MIN: CPT | Performed by: EMERGENCY MEDICINE

## 2024-09-24 RX ORDER — CIPROFLOXACIN AND DEXAMETHASONE 3; 1 MG/ML; MG/ML
4 SUSPENSION/ DROPS AURICULAR (OTIC) 2 TIMES DAILY
Qty: 6 ML | Refills: 0 | Status: SHIPPED | OUTPATIENT
Start: 2024-09-24 | End: 2024-09-24

## 2024-09-24 RX ORDER — OFLOXACIN 3 MG/ML
5 SOLUTION AURICULAR (OTIC) 2 TIMES DAILY
Qty: 7 ML | Refills: 0 | Status: SHIPPED | OUTPATIENT
Start: 2024-09-24 | End: 2024-10-08

## 2024-09-24 NOTE — PROGRESS NOTES
"  [facility]        NAME: Amina Guillen is a 54 y.o. female  : 1970    MRN: [id number]  DATE: 2024  TIME: 5:01 PM    Assessment and Plan   Ear pain, right [H92.01]  1. Ear pain, right  ofloxacin (FLOXIN) 0.3 % otic solution    DISCONTINUED: ciprofloxacin-dexamethasone (CIPRODEX) otic suspension      Per chart review, patient does have chronic bilateral tympanic membrane perforation.  She denies any vertiginous symptoms or vomiting.  She denies left ear pain.  Exam findings are concerning for cholesteatoma of the right ear, advised patient to follow-up closely with her ENT provider [facility]  In the interim, will prescribe ofloxacin attic drops.  Patient advised to seek care in ED if she develops worsening symptoms.  All questions were answered at bedside, patient was amenable to plan and voiced understanding.      Patient Instructions       Follow up with PCP in 3-5 days.  Proceed to  ER if symptoms worsen.    If tests have been performed at [facility], our office will contact you with results if changes need to be made to the care plan discussed with you at the visit.  You can review your full results on [facility] MyChart.    Chief Complaint     Chief Complaint   Patient presents with    Ear Fullness     States both ears feel congested. States outer right ear          History of Present Illness       54-year-old female with history of bilateral tympanic membrane, recurrent otitis externa as well as media presents with a chief complaint of worsening right ear pain which began approximately 2 to 3 days ago.  Patient states that both of her ears feel \"congested\" and that this is chronic for her.  She states that she has a longstanding history of chronic ear problems for which she follows with ENT [facility] for.  Patient denies any discharge or drainage from her right ear and does endorse some decreased hearing.  She denies any tinnitus or " vertigo.  Denies fever.  Additionally, denies any nasal congestion, sore throat or cough.    Earache   There is pain in the right ear. This is a recurrent problem. The current episode started in the past 7 days. The problem occurs constantly. The problem has been gradually worsening. The pain is at a severity of 5/10. The pain is moderate. Pertinent negatives include no abdominal pain, coughing, diarrhea, ear discharge, headaches, hearing loss, neck pain, rash, rhinorrhea, sore throat or vomiting. She has tried nothing for the symptoms. Her past medical history is significant for a chronic ear infection.       Review of Systems   Review of Systems   Constitutional:  Negative for activity change, appetite change, chills, diaphoresis, fatigue, fever and unexpected weight change.   HENT:  Positive for ear pain. Negative for congestion, dental problem, drooling, ear discharge, facial swelling, hearing loss, mouth sores, nosebleeds, postnasal drip, rhinorrhea, sinus pressure, sinus pain, sneezing, sore throat, tinnitus, trouble swallowing and voice change.    Eyes:  Negative for pain and visual disturbance.   Respiratory:  Negative for apnea, cough, choking, chest tightness, shortness of breath, wheezing and stridor.    Cardiovascular:  Negative for chest pain, palpitations and leg swelling.   Gastrointestinal:  Negative for abdominal distention, abdominal pain, anal bleeding, blood in stool, constipation, diarrhea, nausea, rectal pain and vomiting.   Genitourinary:  Negative for dysuria and hematuria.   Musculoskeletal:  Negative for arthralgias, back pain, gait problem, joint swelling, myalgias, neck pain and neck stiffness.   Skin:  Negative for color change and rash.   Neurological:  Negative for dizziness, tremors, seizures, syncope, facial asymmetry, speech difficulty, weakness, light-headedness, numbness and headaches.   All other systems reviewed and are negative.        Current Medications       Current  Outpatient Medications:     albuterol (PROVENTIL HFA,VENTOLIN HFA) 90 mcg/act inhaler, INHALE 1 PUFF EVERY 6 HOURS AS NEEDED FOR WHEEZING OR SHORTNESS OF BREATH., Disp: 18 g, Rfl: 1    atorvastatin (LIPITOR) 40 mg tablet, TAKE 1 TABLET BY MOUTH EVERY DAY, Disp: 90 tablet, Rfl: 1    buPROPion (WELLBUTRIN XL) 150 mg 24 hr tablet, TAKE 1 TABLET BY MOUTH EVERY DAY IN THE MORNING, Disp: 90 tablet, Rfl: 1    escitalopram (LEXAPRO) 10 mg tablet, TAKE 1 TABLET BY MOUTH EVERY DAY, Disp: 90 tablet, Rfl: 1    hydroCHLOROthiazide 25 mg tablet, Take 1 tablet (25 mg total) by mouth daily, Disp: 90 tablet, Rfl: 1    loratadine (CLARITIN) 10 mg tablet, TAKE 1 TABLET BY MOUTH EVERY DAY, Disp: 90 tablet, Rfl: 1    ofloxacin (FLOXIN) 0.3 % otic solution, Administer 5 drops to the right ear 2 (two) times a day for 14 days, Disp: 7 mL, Rfl: 0    guaiFENesin (ROBITUSSIN) 100 MG/5ML oral liquid, Take 10 mL (200 mg total) by mouth 3 (three) times a day as needed for cough (Patient not taking: Reported on 9/24/2024), Disp: 120 mL, Rfl: 0    oxyCODONE (Roxicodone) 5 immediate release tablet, Take 1 tablet (5 mg total) by mouth every 4 (four) hours as needed for moderate pain Max Daily Amount: 30 mg (Patient not taking: Reported on 9/24/2024), Disp: 20 tablet, Rfl: 0    polyethylene glycol (GOLYTELY) 4000 mL solution, Take 4,000 mL by mouth once for 1 dose Take as directed by office, Disp: 4000 mL, Rfl: 0    sodium chloride (OCEAN) 0.65 % nasal spray, 2 sprays into each nostril every 2 (two) hours while awake, Disp: 30 mL, Rfl: 0    Current Allergies     Allergies as of 09/24/2024 - Reviewed 09/24/2024   Allergen Reaction Noted    Bee pollen  12/13/2012    Pollen extract Allergic Rhinitis and Sneezing 12/13/2012            The following portions of the patient's history were reviewed and updated as appropriate: allergies, current medications, past family history, past medical history, past social history, past surgical history and problem  list.     Past Medical History:   Diagnosis Date    Allergic Uk    Anxiety     Arthritis 2018    Back    Depression 11/12/2019    Discharge from right nipple     last assessed 03/20/2013    GERD (gastroesophageal reflux disease) 03/03/2020    Hyperlipidemia     Hypertension     Irritable bowel syndrome     Loose stools 01/30/2018    Subclinical hypothyroidism 04/26/2019       Past Surgical History:   Procedure Laterality Date    CARPAL TUNNEL RELEASE      AL HYSTEROSCOPY BX ENDOMETRIUM&/POLYPC W/WO D&C N/A 11/20/2018    Procedure: DILATATION AND CURETTAGE (D&C) WITH HYSTEROSCOPY;  Surgeon: Robert Farfan DO;  [facility];  Service: Gynecology    AL SEPTOPLASTY/SUBMUCOUS RESECJ W/WO CARTILAGE GRF  2/27/2023    Procedure: SEPTOPLASTY;  Surgeon: Driss Brandon MD;  [facility];  Service: ENT    AL SUBMUCOUS RESCJ INFERIOR TURBINATE PRTL/COMPL N/A 2/27/2023    Procedure: TURBINOPLASTY;  Surgeon: Driss Brandon MD;  [facility];  Service: ENT    TUBAL LIGATION      WRIST SURGERY Bilateral        Family History   Problem Relation Age of Onset    Heart attack Mother     Diabetes Father     Hypertension Father     Hyperlipidemia Father     Breast cancer Maternal Grandmother 60    Asthma Sister     Fibromyalgia Sister     No Known Problems Brother     Depression Daughter     No Known Problems Maternal Grandfather     ADD / ADHD Paternal Grandmother     No Known Problems Sister     No Known Problems Sister     No Known Problems Brother     Mental retardation Brother     Cerebral palsy Brother     ADD / ADHD Daughter     Bipolar disorder Daughter          Medications have been verified.        Objective   /96   Pulse 85   Temp 98.9 °F (37.2 °C)   Resp 18   Wt 102 kg (224 lb)   LMP 01/01/2016   SpO2 94%   BMI 39.68 kg/m²   Patient's last menstrual period was 01/01/2016.       Physical Exam     Physical Exam  Vitals and nursing note reviewed.   Constitutional:       General: She is not  in acute distress.     Appearance: Normal appearance. She is normal weight. She is not ill-appearing, toxic-appearing or diaphoretic.   HENT:      Head: Normocephalic and atraumatic.      Right Ear: External ear normal. No decreased hearing noted. Tenderness present. No laceration, drainage or swelling. No middle ear effusion. There is no impacted cerumen.      Left Ear: External ear normal. There is no impacted cerumen. Tympanic membrane is perforated.      Ears:        Nose: Nose normal. No congestion or rhinorrhea.      Mouth/Throat:      Mouth: Mucous membranes are moist.      Pharynx: No oropharyngeal exudate or posterior oropharyngeal erythema.   Eyes:      General: No scleral icterus.        Right eye: No discharge.         Left eye: No discharge.      Extraocular Movements: Extraocular movements intact.      Pupils: Pupils are equal, round, and reactive to light.   Neck:      Vascular: No carotid bruit.   Cardiovascular:      Rate and Rhythm: Normal rate and regular rhythm.      Pulses: Normal pulses.           Carotid pulses are 2+ on the right side and 2+ on the left side.       Radial pulses are 2+ on the right side and 2+ on the left side.      Heart sounds: No murmur heard.     No friction rub. No gallop.   Pulmonary:      Effort: Pulmonary effort is normal. No respiratory distress.      Breath sounds: Normal breath sounds. No stridor. No wheezing, rhonchi or rales.   Chest:      Chest wall: No tenderness.   Abdominal:      Palpations: There is no mass.   Musculoskeletal:         General: No swelling, tenderness, deformity or signs of injury.      Cervical back: Normal range of motion and neck supple. No rigidity or tenderness.      Right lower leg: No edema.      Left lower leg: No edema.   Lymphadenopathy:      Cervical: No cervical adenopathy.   Skin:     General: Skin is warm and dry.   Neurological:      General: No focal deficit present.      Mental Status: She is alert and oriented to person,  place, and time.      Cranial Nerves: No cranial nerve deficit.   Psychiatric:         Mood and Affect: Mood normal.         Behavior: Behavior normal.

## 2024-09-25 DIAGNOSIS — F17.200 CURRENT EVERY DAY SMOKER: ICD-10-CM

## 2024-09-25 DIAGNOSIS — E78.5 DYSLIPIDEMIA: ICD-10-CM

## 2024-09-25 DIAGNOSIS — F41.1 GENERALIZED ANXIETY DISORDER: ICD-10-CM

## 2024-09-26 RX ORDER — ESCITALOPRAM OXALATE 10 MG/1
TABLET ORAL
Qty: 90 TABLET | Refills: 1 | Status: SHIPPED | OUTPATIENT
Start: 2024-09-26

## 2024-09-26 RX ORDER — ATORVASTATIN CALCIUM 40 MG/1
TABLET, FILM COATED ORAL
Qty: 90 TABLET | Refills: 1 | Status: SHIPPED | OUTPATIENT
Start: 2024-09-26

## 2024-09-26 RX ORDER — BUPROPION HYDROCHLORIDE 150 MG/1
TABLET ORAL
Qty: 90 TABLET | Refills: 1 | Status: SHIPPED | OUTPATIENT
Start: 2024-09-26

## 2025-02-10 ENCOUNTER — OFFICE VISIT (OUTPATIENT)
Dept: URGENT CARE | Age: 55
End: 2025-02-10
Payer: COMMERCIAL

## 2025-02-10 VITALS
DIASTOLIC BLOOD PRESSURE: 86 MMHG | OXYGEN SATURATION: 99 % | RESPIRATION RATE: 18 BRPM | SYSTOLIC BLOOD PRESSURE: 128 MMHG | TEMPERATURE: 97.5 F | HEART RATE: 77 BPM

## 2025-02-10 DIAGNOSIS — H92.02 EAR PAIN, LEFT: Primary | ICD-10-CM

## 2025-02-10 PROCEDURE — 99213 OFFICE O/P EST LOW 20 MIN: CPT

## 2025-02-10 RX ORDER — OFLOXACIN 3 MG/ML
10 SOLUTION AURICULAR (OTIC) DAILY
Qty: 10 ML | Refills: 0 | Status: SHIPPED | OUTPATIENT
Start: 2025-02-10

## 2025-02-11 NOTE — PATIENT INSTRUCTIONS
Use antibiotic as prescribed  Avoid Q-Tip use  Tylenol/Ibuprofen for discomfort  Fluids  Change pillowcase daily  Follow up with PCP in 3-5 days.  Proceed to ER if symptoms worsen.

## 2025-02-11 NOTE — PROGRESS NOTES
[facility]        NAME: Amina Guillen is a 54 y.o. female  : 1970    MRN: [id number]  DATE: February 10, 2025  TIME: 7:50 PM    Assessment and Plan   Ear pain, left [H92.02]  1. Ear pain, left  ofloxacin (FLOXIN) 0.3 % otic solution            Patient Instructions     Use antibiotic as prescribed  Avoid Q-Tip use  Tylenol/Ibuprofen for discomfort  Fluids  Change pillowcase daily  Follow up with PCP in 3-5 days.  Proceed to  ER if symptoms worsen.    If tests are performed, our office will contact you with results only if changes need to made to the care plan discussed with you at the visit. [facility]    Chief Complaint     Chief Complaint   Patient presents with    Cold Like Symptoms     Left ear throbbing and sore throat for 4 days.          History of Present Illness       Patient is a 53 yo female with significant PMH of HTN and chronic b/l TM perforation presenting in the clinic today for cold sx x 4 days. Admits left ear pain, decreased hearing of left ear, and sore throat. Denies fever, chills, body aches, fatigue, cough, congestion, rhinorrhea, headache, dizziness, ear discharge, tinnitus, chest pain, SOB, abdominal pain, n/v/d. Denies the use of OTC tx for sx management.  Positive recent sick contact with patient's grandson is experiencing similar symptoms.        Review of Systems   Review of Systems   Constitutional:  Negative for chills, fatigue and fever.   HENT:  Positive for ear pain, hearing loss and sore throat. Negative for congestion, ear discharge, postnasal drip, rhinorrhea, sinus pressure, sinus pain and tinnitus.    Respiratory:  Negative for cough and shortness of breath.    Cardiovascular:  Negative for chest pain.   Gastrointestinal:  Negative for abdominal pain, diarrhea, nausea and vomiting.   Musculoskeletal:  Negative for myalgias.   Skin:  Negative for rash.   Neurological:  Negative for headaches.         Current  Medications       Current Outpatient Medications:     albuterol (PROVENTIL HFA,VENTOLIN HFA) 90 mcg/act inhaler, INHALE 1 PUFF EVERY 6 HOURS AS NEEDED FOR WHEEZING OR SHORTNESS OF BREATH., Disp: 18 g, Rfl: 1    atorvastatin (LIPITOR) 40 mg tablet, TAKE 1 TABLET BY MOUTH EVERY DAY, Disp: 90 tablet, Rfl: 1    buPROPion (WELLBUTRIN XL) 150 mg 24 hr tablet, TAKE 1 TABLET BY MOUTH EVERY DAY IN THE MORNING, Disp: 90 tablet, Rfl: 1    escitalopram (LEXAPRO) 10 mg tablet, TAKE 1 TABLET BY MOUTH EVERY DAY, Disp: 90 tablet, Rfl: 1    hydroCHLOROthiazide 25 mg tablet, Take 1 tablet (25 mg total) by mouth daily, Disp: 90 tablet, Rfl: 1    ofloxacin (FLOXIN) 0.3 % otic solution, Administer 10 drops into the left ear daily, Disp: 10 mL, Rfl: 0    guaiFENesin (ROBITUSSIN) 100 MG/5ML oral liquid, Take 10 mL (200 mg total) by mouth 3 (three) times a day as needed for cough (Patient not taking: Reported on 2/10/2025), Disp: 120 mL, Rfl: 0    loratadine (CLARITIN) 10 mg tablet, TAKE 1 TABLET BY MOUTH EVERY DAY (Patient not taking: Reported on 2/10/2025), Disp: 90 tablet, Rfl: 1    oxyCODONE (Roxicodone) 5 immediate release tablet, Take 1 tablet (5 mg total) by mouth every 4 (four) hours as needed for moderate pain Max Daily Amount: 30 mg (Patient not taking: Reported on 9/24/2024), Disp: 20 tablet, Rfl: 0    polyethylene glycol (GOLYTELY) 4000 mL solution, Take 4,000 mL by mouth once for 1 dose Take as directed by office (Patient not taking: Reported on 2/10/2025), Disp: 4000 mL, Rfl: 0    sodium chloride (OCEAN) 0.65 % nasal spray, 2 sprays into each nostril every 2 (two) hours while awake (Patient not taking: Reported on 2/10/2025), Disp: 30 mL, Rfl: 0    Current Allergies     Allergies as of 02/10/2025 - Reviewed 02/10/2025   Allergen Reaction Noted    Bee pollen  12/13/2012    Pollen extract Allergic Rhinitis and Sneezing 12/13/2012            The following portions of the patient's history were reviewed and updated as  appropriate: allergies, current medications, past family history, past medical history, past social history, past surgical history and problem list.     Past Medical History:   Diagnosis Date    Allergic Uk    Anxiety     Arthritis 2018    Back    Depression 11/12/2019    Discharge from right nipple     last assessed 03/20/2013    GERD (gastroesophageal reflux disease) 03/03/2020    Hyperlipidemia     Hypertension     Irritable bowel syndrome     Loose stools 01/30/2018    Subclinical hypothyroidism 04/26/2019       Past Surgical History:   Procedure Laterality Date    CARPAL TUNNEL RELEASE      AK HYSTEROSCOPY BX ENDOMETRIUM&/POLYPC W/WO D&C N/A 11/20/2018    Procedure: DILATATION AND CURETTAGE (D&C) WITH HYSTEROSCOPY;  Surgeon: Robert Farfan DO;  [facility];  Service: Gynecology    AK SEPTOPLASTY/SUBMUCOUS RESECJ W/WO CARTILAGE GRF  2/27/2023    Procedure: SEPTOPLASTY;  Surgeon: Driss Brandon MD;  [facility];  Service: ENT    AK SUBMUCOUS RESCJ INFERIOR TURBINATE PRTL/COMPL N/A 2/27/2023    Procedure: TURBINOPLASTY;  Surgeon: Drsis Brandon MD;  [facility];  Service: ENT    TUBAL LIGATION      WRIST SURGERY Bilateral        Family History   Problem Relation Age of Onset    Heart attack Mother     Diabetes Father     Hypertension Father     Hyperlipidemia Father     Breast cancer Maternal Grandmother 60    Asthma Sister     Fibromyalgia Sister     No Known Problems Brother     Depression Daughter     No Known Problems Maternal Grandfather     ADD / ADHD Paternal Grandmother     No Known Problems Sister     No Known Problems Sister     No Known Problems Brother     Mental retardation Brother     Cerebral palsy Brother     ADD / ADHD Daughter     Bipolar disorder Daughter          Medications have been verified.        Objective   /86 (BP Location: Left arm, Patient Position: Sitting, Cuff Size: Large)   Pulse 77   Temp 97.5 °F (36.4 °C) (Tympanic)   Resp 18   LMP  01/01/2016   SpO2 99%        Physical Exam     Physical Exam  Vitals reviewed.   Constitutional:       General: She is not in acute distress.     Appearance: Normal appearance. She is normal weight. She is not ill-appearing.   HENT:      Head: Normocephalic.      Right Ear: Hearing, ear canal and external ear normal. No middle ear effusion. There is no impacted cerumen. Tympanic membrane is perforated. Tympanic membrane is not erythematous or bulging.      Left Ear: Hearing, ear canal and external ear normal. Tenderness present.  No middle ear effusion. There is no impacted cerumen. Tympanic membrane is perforated. Tympanic membrane is not erythematous or bulging.      Nose: Nose normal. No congestion or rhinorrhea.      Right Sinus: No maxillary sinus tenderness or frontal sinus tenderness.      Left Sinus: No maxillary sinus tenderness or frontal sinus tenderness.      Mouth/Throat:      Lips: Pink.      Mouth: Mucous membranes are moist.      Pharynx: Oropharynx is clear. Uvula midline. No pharyngeal swelling, oropharyngeal exudate, posterior oropharyngeal erythema or uvula swelling.      Tonsils: No tonsillar exudate or tonsillar abscesses. 1+ on the right. 1+ on the left.   Eyes:      General:         Right eye: No discharge.         Left eye: No discharge.      Conjunctiva/sclera: Conjunctivae normal.   Cardiovascular:      Rate and Rhythm: Normal rate and regular rhythm.      Pulses: Normal pulses.      Heart sounds: Normal heart sounds. No murmur heard.     No friction rub. No gallop.   Pulmonary:      Effort: Pulmonary effort is normal. No respiratory distress.      Breath sounds: Normal breath sounds. No stridor. No wheezing, rhonchi or rales.   Musculoskeletal:      Cervical back: Normal range of motion and neck supple. No tenderness.   Lymphadenopathy:      Cervical: No cervical adenopathy.   Skin:     General: Skin is warm.      Findings: No rash.   Neurological:      Mental Status: She is alert.    Psychiatric:         Mood and Affect: Mood normal.         Behavior: Behavior normal.

## 2025-03-07 ENCOUNTER — VBI (OUTPATIENT)
Dept: ADMINISTRATIVE | Facility: OTHER | Age: 55
End: 2025-03-07

## 2025-03-07 NOTE — TELEPHONE ENCOUNTER
03/07/25 1:01 PM     Chart reviewed for Pap Smear (HPV) aka Cervical Cancer Screening ; nothing is submitted to the patient's insurance at this time.     Jens Toledo MA   [facility]

## 2025-03-31 DIAGNOSIS — F17.200 CURRENT EVERY DAY SMOKER: ICD-10-CM

## 2025-03-31 DIAGNOSIS — F41.1 GENERALIZED ANXIETY DISORDER: ICD-10-CM

## 2025-03-31 DIAGNOSIS — E78.5 DYSLIPIDEMIA: ICD-10-CM

## 2025-03-31 RX ORDER — ESCITALOPRAM OXALATE 10 MG/1
10 TABLET ORAL DAILY
Qty: 90 TABLET | Refills: 1 | Status: SHIPPED | OUTPATIENT
Start: 2025-03-31

## 2025-03-31 RX ORDER — ATORVASTATIN CALCIUM 40 MG/1
40 TABLET, FILM COATED ORAL DAILY
Qty: 90 TABLET | Refills: 1 | Status: SHIPPED | OUTPATIENT
Start: 2025-03-31

## 2025-03-31 RX ORDER — BUPROPION HYDROCHLORIDE 150 MG/1
150 TABLET ORAL EVERY MORNING
Qty: 90 TABLET | Refills: 1 | Status: SHIPPED | OUTPATIENT
Start: 2025-03-31

## 2025-03-31 NOTE — TELEPHONE ENCOUNTER
Refill request for the following medications received,   [facility] [address] [facility]

## 2025-04-01 ENCOUNTER — TELEPHONE (OUTPATIENT)
Dept: FAMILY MEDICINE CLINIC | Facility: CLINIC | Age: 55
End: 2025-04-01

## 2025-05-15 ENCOUNTER — TELEPHONE (OUTPATIENT)
Dept: FAMILY MEDICINE CLINIC | Facility: CLINIC | Age: 55
End: 2025-05-15

## 2025-05-15 ENCOUNTER — OFFICE VISIT (OUTPATIENT)
Dept: FAMILY MEDICINE CLINIC | Facility: CLINIC | Age: 55
End: 2025-05-15

## 2025-05-15 VITALS
SYSTOLIC BLOOD PRESSURE: 158 MMHG | RESPIRATION RATE: 18 BRPM | TEMPERATURE: 98.3 F | OXYGEN SATURATION: 96 % | DIASTOLIC BLOOD PRESSURE: 100 MMHG | WEIGHT: 226 LBS | HEIGHT: 64 IN | BODY MASS INDEX: 38.58 KG/M2 | HEART RATE: 90 BPM

## 2025-05-15 DIAGNOSIS — N92.0 SPOTTING: ICD-10-CM

## 2025-05-15 DIAGNOSIS — Z12.31 ENCOUNTER FOR SCREENING MAMMOGRAM FOR BREAST CANCER: ICD-10-CM

## 2025-05-15 DIAGNOSIS — E78.5 HYPERLIPIDEMIA, UNSPECIFIED HYPERLIPIDEMIA TYPE: ICD-10-CM

## 2025-05-15 DIAGNOSIS — J30.2 SEASONAL ALLERGIES: ICD-10-CM

## 2025-05-15 DIAGNOSIS — Z12.2 SCREENING FOR LUNG CANCER: ICD-10-CM

## 2025-05-15 DIAGNOSIS — N39.3 STRESS INCONTINENCE: ICD-10-CM

## 2025-05-15 DIAGNOSIS — Z00.00 ANNUAL PHYSICAL EXAM: Primary | ICD-10-CM

## 2025-05-15 DIAGNOSIS — Z13.6 SCREENING FOR CARDIOVASCULAR CONDITION: ICD-10-CM

## 2025-05-15 PROCEDURE — 99396 PREV VISIT EST AGE 40-64: CPT | Performed by: FAMILY MEDICINE

## 2025-05-15 RX ORDER — FLUTICASONE PROPIONATE 50 MCG
1 SPRAY, SUSPENSION (ML) NASAL DAILY
Qty: 11.1 ML | Refills: 0 | Status: SHIPPED | OUTPATIENT
Start: 2025-05-15

## 2025-05-15 NOTE — PROGRESS NOTES
Adult Annual Physical  Name: Amina Guillen      : 1970      MRN: [id number]  Encounter Provider: Shiva Kern MD  Encounter Date: 5/15/2025   Encounter department: [facility]    Assessment & Plan  Annual physical exam         Encounter for screening mammogram for breast cancer  Last mammogram 2 years ago. Order placed for repeat.   Orders:    Mammo screening bilateral w 3d and cad; Future    Screening for cardiovascular condition    Orders:    Lipid Panel with Direct LDL reflex; Future    Vitamin D 25 hydroxy; Future    Hyperlipidemia, unspecified hyperlipidemia type    Orders:    Lipid Panel with Direct LDL reflex; Future    Basic metabolic panel; Future    TSH, 3rd generation; Future    Stress incontinence  55 year old female, menopausal, s/p 2 vaginal deliveries, endorsing significant stress incontinence and leakage of urine when she coughs. Incontinence in office during visit. Discussed managament options with patient. Declines Urogyn referral at this time. Amenable to trial of pelvic floor physical therapy. Referral placed for same and will follow up at next visit.   Orders:    Ambulatory Referral to Physical Therapy; Future    Screening for lung cancer  I discussed with her that she is a candidate for lung cancer CT screening.     The following Shared Decision-Making points were covered:  Benefits of screening were discussed, including the rates of reduction in death from lung cancer and other causes.  Harms of screening were reviewed, including false positive tests, radiation exposure levels, risks of invasive procedures, risks of complications of screening, and risk of overdiagnosis.  I counseled on the importance of adherence to annual lung cancer LDCT screening, impact of co-morbidities, and ability or willingness to undergo diagnosis and treatment.  I counseled on the importance of maintaining abstinence as a former smoker or was counseled on  the importance of smoking cessation if a current smoker    Review of Eligibility Criteria: She meets all of the criteria for Lung Cancer Screening.   She is 55 y.o.   She has 20 pack year tobacco history and is a current smoker or has quit within the past 15 years  She presents no signs or symptoms of lung cancer    After discussion, the patient decided to elect lung cancer screening.    Orders:    CT Lung Screening Program; Future    Spotting  Patient endorses 1x episode of spotting within the last few months. Last menstrual period 2018. Has not had any recent pap smears. Discussed risks of cervical, endometrial cancer. Will bring back for pap smear/endometrial biopsy. Will obtain TVUS. Patient endorses understanding.     Orders:    US pelvis complete w transvaginal; Future        Preventive Screenings:  - Diabetes Screening: orders placed  - Cholesterol Screening: screening not indicated and has hyperlipidemia   - Hepatitis C screening: screening up-to-date   - HIV screening: screening up-to-date   - Cervical cancer screening: risks/benefits discussed   - Breast cancer screening: risks/benefits discussed, orders placed and screening up-to-date   - Colon cancer screening: screening up-to-date   - Lung cancer screening: orders placed and risks/benefits discussed     Counseling/Anticipatory Guidance:  - Alcohol: discussed moderation in alcohol intake and recommendations for healthy alcohol use.   - Drug use: discussed harms of illicit drug use and how it can negatively impact mental/physical health.   - Tobacco use: discussed harms of tobacco use and management options for quitting.   - Dental health: discussed importance of regular tooth brushing, flossing, and dental visits.   - Sexual health: discussed sexually transmitted diseases, partner selection, use of condoms, avoidance of unintended pregnancy, and contraceptive alternatives.   - Diet: discussed recommendations for a healthy/well-balanced diet.   -  Exercise: the importance of regular exercise/physical activity was discussed. Recommend exercise 3-5 times per week for at least 30 minutes.   - Injury prevention: discussed safety/seat belts, safety helmets, smoke detectors, carbon monoxide detectors, and smoking near bedding or upholstery.     BMI Counseling: Body mass index is 38.91 kg/m². The BMI is above normal. Nutrition recommendations include decreasing portion sizes, decreasing fast food intake and limiting drinks that contain sugar. Exercise recommendations include moderate physical activity 150 minutes/week and exercising 3-5 times per week. Rationale for BMI follow-up plan is due to patient being overweight or obese.         History of Present Illness     Adult Annual Physical:  Patient presents for annual physical.     Diet and Physical Activity:  - Diet/Nutrition: no special diet, poor diet, frequent junk food and consuming 3-5 servings of fruits/vegetables daily.  - Exercise: no formal exercise.    Depression Screening:  - PHQ-2 Score: 0  - PHQ-9 Score: 6    General Health:  - Sleep: sleeps poorly and snores loudly.  - Hearing: normal hearing left ear and decreased hearing right ear.  - Vision: vision problems.  - Dental: no dental visits for > 1 year, brushes teeth twice daily and does not floss.    /GYN Health:  - Follows with GYN: no.   - Menopause: postmenopausal.   - Last menstrual cycle: 1/1/2018.   - History of STDs: no  - Contraception: menopause.      Advanced Care Planning:  - Has an advanced directive?: no    - Has a durable medical POA?: no      Review of Systems   Constitutional:  Negative for chills and fever.   HENT:  Negative for ear pain and sore throat.    Eyes:  Negative for pain and visual disturbance.   Respiratory:  Negative for cough and shortness of breath.    Cardiovascular:  Negative for chest pain and palpitations.   Gastrointestinal:  Negative for abdominal pain and vomiting.   Genitourinary:  Positive for menstrual  "problem. Negative for dysuria and hematuria.   Musculoskeletal:  Negative for arthralgias and back pain.   Skin:  Negative for color change and rash.   Neurological:  Negative for seizures and syncope.   All other systems reviewed and are negative.        Objective   /100 (BP Location: Left arm, Patient Position: Sitting, Cuff Size: Large)   Pulse 90   Temp 98.3 °F (36.8 °C)   Resp 18   Ht 5' 3.9\" (1.623 m)   Wt 103 kg (226 lb)   LMP 01/01/2018   SpO2 96%   BMI 38.91 kg/m²     Physical Exam  Vitals and nursing note reviewed.   Constitutional:       General: She is not in acute distress.     Appearance: She is well-developed.   HENT:      Head: Normocephalic and atraumatic.      Right Ear: Hearing, ear canal and external ear normal. Tympanic membrane is perforated.      Left Ear: Hearing, ear canal and external ear normal. Tympanic membrane is perforated.     Eyes:      Conjunctiva/sclera: Conjunctivae normal.       Cardiovascular:      Rate and Rhythm: Normal rate and regular rhythm.      Heart sounds: No murmur heard.  Pulmonary:      Effort: Pulmonary effort is normal. No respiratory distress.      Breath sounds: Normal breath sounds.   Abdominal:      Palpations: Abdomen is soft.      Tenderness: There is no abdominal tenderness.     Musculoskeletal:         General: No swelling.      Cervical back: Neck supple.     Skin:     General: Skin is warm and dry.      Capillary Refill: Capillary refill takes less than 2 seconds.     Neurological:      Mental Status: She is alert.     Psychiatric:         Mood and Affect: Mood normal.       "

## 2025-05-15 NOTE — PATIENT INSTRUCTIONS
"Patient Education     Routine physical for adults   The Basics   Written by the doctors and editors at [facility]   What is a physical? -- A physical is a routine visit, or \"check-up,\" with your doctor. You might also hear it called a \"wellness visit\" or \"preventive visit.\"  During each visit, the doctor will:   Ask about your physical and mental health   Ask about your habits, behaviors, and lifestyle   Do an exam   Give you vaccines if needed   Talk to you about any medicines you take   Give advice about your health   Answer your questions  Getting regular check-ups is an important part of taking care of your health. It can help your doctor find and treat any problems you have. But it's also important for preventing health problems.  A routine physical is different from a \"sick visit.\" A sick visit is when you see a doctor because of a health concern or problem. Since physicals are scheduled ahead of time, you can think about what you want to ask the doctor.  How often should I get a physical? -- It depends on your age and health. In general, for people age 21 years and older:   If you are younger than 50 years, you might be able to get a physical every 3 years.   If you are 50 years or older, your doctor might recommend a physical every year.  If you have an ongoing health condition, like diabetes or high blood pressure, your doctor will probably want to see you more often.  What happens during a physical? -- In general, each visit will include:   Physical exam - The doctor or nurse will check your height, weight, heart rate, and blood pressure. They will also look at your eyes and ears. They will ask about how you are feeling and whether you have any symptoms that bother you.   Medicines - It's a good idea to bring a list of all the medicines you take to each doctor visit. Your doctor will talk to you about your medicines and answer any questions. Tell them if you are having any side effects that bother you. You " "should also tell them if you are having trouble paying for any of your medicines.   Habits and behaviors - This includes:   Your diet   Your exercise habits   Whether you smoke, drink alcohol, or use drugs   Whether you are sexually active   Whether you feel safe at home  Your doctor will talk to you about things you can do to improve your health and lower your risk of health problems. They will also offer help and support. For example, if you want to quit smoking, they can give you advice and might prescribe medicines. If you want to improve your diet or get more physical activity, they can help you with this, too.   Lab tests, if needed - The tests you get will depend on your age and situation. For example, your doctor might want to check your:   Cholesterol   Blood sugar   Iron level   Vaccines - The recommended vaccines will depend on your age, health, and what vaccines you already had. Vaccines are very important because they can prevent certain serious or deadly infections.   Discussion of screening - \"Screening\" means checking for diseases or other health problems before they cause symptoms. Your doctor can recommend screening based on your age, risk, and preferences. This might include tests to check for:   Cancer, such as breast, prostate, cervical, ovarian, colorectal, prostate, lung, or skin cancer   Sexually transmitted infections, such as chlamydia and gonorrhea   Mental health conditions like depression and anxiety  Your doctor will talk to you about the different types of screening tests. They can help you decide which screenings to have. They can also explain what the results might mean.   Answering questions - The physical is a good time to ask the doctor or nurse questions about your health. If needed, they can refer you to other doctors or specialists, too.  Adults older than 65 years often need other care, too. As you get older, your doctor will talk to you about:   How to prevent falling at " home   Hearing or vision tests   Memory testing   How to take your medicines safely   Making sure that you have the help and support you need at home  All topics are updated as new evidence becomes available and our peer review process is complete.  This topic retrieved from [employer] on: May 02, 2024.  Topic 772580 Version 1.0  Release: 32.4.3 - C32.122  © 2024 UpToDate, Inc. and/or its affiliates. All rights reserved.  Consumer Information Use and Disclaimer   Disclaimer: This generalized information is a limited summary of diagnosis, treatment, and/or medication information. It is not meant to be comprehensive and should be used as a tool to help the user understand and/or assess potential diagnostic and treatment options. It does NOT include all information about conditions, treatments, medications, side effects, or risks that may apply to a specific patient. It is not intended to be medical advice or a substitute for the medical advice, diagnosis, or treatment of a health care provider based on the health care provider's examination and assessment of a patient's specific and unique circumstances. Patients must speak with a health care provider for complete information about their health, medical questions, and treatment options, including any risks or benefits regarding use of medications. This information does not endorse any treatments or medications as safe, effective, or approved for treating a specific patient. UpToDate, Inc. and its affiliates disclaim any warranty or liability relating to this information or the use thereof.The use of this information is governed by the Terms of Use, available at https://www.[employer].com/en/know/clinical-effectiveness-terms. 2024© UpToDate, Inc. and its affiliates and/or licensors. All rights reserved.  Copyright   © 2024 UpToDate, Inc. and/or its affiliates. All rights reserved.

## 2025-05-15 NOTE — TELEPHONE ENCOUNTER
Upon checkout today, Dr. Kern is requesting a 4 week follow up appointment for a colposcopy/pap smear for the patient.    Can you schedule?    Thank you.

## 2025-05-15 NOTE — ASSESSMENT & PLAN NOTE
Patient endorses 1x episode of spotting within the last few months. Last menstrual period 2018. Has not had any recent pap smears. Discussed risks of cervical, endometrial cancer. Will bring back for pap smear/endometrial biopsy. Will obtain TVUS. Patient endorses understanding.     Orders:    US pelvis complete w transvaginal; Future

## 2025-05-20 ENCOUNTER — TELEPHONE (OUTPATIENT)
Dept: FAMILY MEDICINE CLINIC | Facility: CLINIC | Age: 55
End: 2025-05-20

## 2025-05-20 NOTE — TELEPHONE ENCOUNTER
----- Message from Shiva Kern MD sent at 5/15/2025  5:22 PM EDT -----  Regarding: Endometrial biopsy  As discussed, would we be able to get this woman scheduled for an endometrial biopsy? After discussing with Dr. Ventura, I also believe patient would benefit from a transvaginal Ultrasound of the uterus, I have placed an order. If you could please let her know when she gets scheduled. Thank you.

## 2025-05-20 NOTE — TELEPHONE ENCOUNTER
Called patient, scheduled Mon 6/16/25 @ 4:20pm. Advised patient to schedule US, provided scheduling information to her.

## 2025-06-16 ENCOUNTER — PROCEDURE VISIT (OUTPATIENT)
Dept: FAMILY MEDICINE CLINIC | Facility: CLINIC | Age: 55
End: 2025-06-16

## 2025-06-16 VITALS
RESPIRATION RATE: 18 BRPM | DIASTOLIC BLOOD PRESSURE: 99 MMHG | BODY MASS INDEX: 38.5 KG/M2 | OXYGEN SATURATION: 94 % | SYSTOLIC BLOOD PRESSURE: 158 MMHG | WEIGHT: 223.6 LBS | TEMPERATURE: 98.2 F | HEART RATE: 80 BPM

## 2025-06-16 DIAGNOSIS — Z12.4 SCREENING FOR CERVICAL CANCER: Primary | ICD-10-CM

## 2025-06-16 DIAGNOSIS — Z72.0 SMOKING TRYING TO QUIT: ICD-10-CM

## 2025-06-16 DIAGNOSIS — Z98.890 H/O GYNECOLOGICAL PROCEDURE: ICD-10-CM

## 2025-06-16 LAB — SL AMB POCT URINE HCG: NEGATIVE

## 2025-06-16 PROCEDURE — 99396 PREV VISIT EST AGE 40-64: CPT | Performed by: FAMILY MEDICINE

## 2025-06-16 PROCEDURE — G0145 SCR C/V CYTO,THINLAYER,RESCR: HCPCS

## 2025-06-16 PROCEDURE — 81025 URINE PREGNANCY TEST: CPT | Performed by: FAMILY MEDICINE

## 2025-06-16 NOTE — PROGRESS NOTES
Name: Amina Guillen      : 1970      MRN: [id number]  Encounter Provider: Shiva Kern MD  Encounter Date: 2025   Encounter department: [facility] [place]  :  Assessment & Plan  Screening for cervical cancer  Patient initially presenting for endometrial biopsy however deferred upon discussion of risks/benefits and expected levels of pain. Would like to wait for results of uterine US prior to EM Biopsy. Pap smear screening performed today and patient will return for EM biopsy in 1 mo.   Orders:    Conventional pap smear, screening; Future    H/O gynecological procedure    Orders:    POCT urine HCG    Smoking trying to quit  Requesting nicotine patch, supplied.   Orders:    nicotine (NICODERM CQ) 7 mg/24hr TD 24 hr patch; Place 1 patch on the skin every 24 hours over 24 hours           History of Present Illness   Patient presenting for endometrial biopsy today. No acute concerns.       Review of Systems   Constitutional:  Negative for chills and fever.   HENT:  Negative for ear pain and sore throat.    Eyes:  Negative for pain and visual disturbance.   Respiratory:  Negative for cough and shortness of breath.    Cardiovascular:  Negative for chest pain and palpitations.   Gastrointestinal:  Negative for abdominal pain and vomiting.   Genitourinary:  Negative for dysuria and hematuria.   Musculoskeletal:  Negative for arthralgias and back pain.   Skin:  Negative for color change and rash.   Neurological:  Negative for seizures and syncope.   All other systems reviewed and are negative.      Objective   /99 (BP Location: Left arm, Patient Position: Sitting, Cuff Size: Large)   Pulse 80   Temp 98.2 °F (36.8 °C)   Resp 18   Wt 101 kg (223 lb 9.6 oz)   LMP 2018   SpO2 94%   BMI 38.50 kg/m²      Physical Exam  Vitals and nursing note reviewed. Exam conducted with a chaperone present.   Constitutional:       General: She is not in acute  distress.     Appearance: She is well-developed.   HENT:      Head: Normocephalic and atraumatic.     Eyes:      Conjunctiva/sclera: Conjunctivae normal.       Cardiovascular:      Rate and Rhythm: Normal rate and regular rhythm.      Heart sounds: No murmur heard.  Pulmonary:      Effort: Pulmonary effort is normal. No respiratory distress.      Breath sounds: Normal breath sounds.   Abdominal:      Palpations: Abdomen is soft.      Tenderness: There is no abdominal tenderness.   Genitourinary:     General: Normal vulva.      Exam position: Supine.      Pubic Area: No rash or pubic lice.       Labia:         Right: No lesion.         Left: No lesion.       Vagina: Normal.      Cervix: Friability present. No discharge, lesion or cervical bleeding.     Musculoskeletal:         General: No swelling.      Cervical back: Neck supple.     Skin:     General: Skin is warm and dry.      Capillary Refill: Capillary refill takes less than 2 seconds.     Neurological:      Mental Status: She is alert.     Psychiatric:         Mood and Affect: Mood normal.

## 2025-06-16 NOTE — ASSESSMENT & PLAN NOTE
Requesting nicotine patch, supplied.   Orders:    nicotine (NICODERM CQ) 7 mg/24hr TD 24 hr patch; Place 1 patch on the skin every 24 hours over 24 hours

## 2025-06-20 ENCOUNTER — HOSPITAL ENCOUNTER (OUTPATIENT)
Dept: RADIOLOGY | Age: 55
Discharge: HOME | End: 2025-06-20
Payer: COMMERCIAL

## 2025-06-20 DIAGNOSIS — Z12.2 SCREENING FOR LUNG CANCER: ICD-10-CM

## 2025-06-20 DIAGNOSIS — N92.0 SPOTTING: ICD-10-CM

## 2025-06-20 LAB
LAB AP GYN PRIMARY INTERPRETATION: NORMAL
Lab: NORMAL
PATH INTERP SPEC-IMP: NORMAL

## 2025-06-20 PROCEDURE — 76856 US EXAM PELVIC COMPLETE: CPT

## 2025-06-20 PROCEDURE — 76830 TRANSVAGINAL US NON-OB: CPT

## 2025-07-02 DIAGNOSIS — I10 ESSENTIAL HYPERTENSION: ICD-10-CM

## 2025-07-02 RX ORDER — HYDROCHLOROTHIAZIDE 25 MG/1
25 TABLET ORAL DAILY
Qty: 90 TABLET | Refills: 1 | Status: SHIPPED | OUTPATIENT
Start: 2025-07-02

## 2025-07-03 ENCOUNTER — TELEPHONE (OUTPATIENT)
Dept: FAMILY MEDICINE CLINIC | Facility: CLINIC | Age: 55
End: 2025-07-03

## 2025-07-03 NOTE — TELEPHONE ENCOUNTER
Paty,    You had schedule Procedure for Dr. Garcia.  Patient is not able to make it so it was scheduled for 7/21/25 at 2:20 pm    Wanted to make sure it was ok    Thanks!

## 2025-07-15 DIAGNOSIS — J30.2 SEASONAL ALLERGIES: ICD-10-CM

## 2025-07-16 RX ORDER — FLUTICASONE PROPIONATE 50 MCG
SPRAY, SUSPENSION (ML) NASAL
Qty: 24 ML | Refills: 1 | Status: SHIPPED | OUTPATIENT
Start: 2025-07-16

## 2025-07-17 ENCOUNTER — TELEPHONE (OUTPATIENT)
Dept: FAMILY MEDICINE CLINIC | Facility: CLINIC | Age: 55
End: 2025-07-17

## 2025-07-17 NOTE — TELEPHONE ENCOUNTER
LVM for patient to advise she will be called by Clinical when appointment can be scheduled at a later date.    Procedure Appointment 7/21/25 at 2:20 pm cancelled Provider not avail    Patient can be reached at [phone]

## 2025-07-17 NOTE — TELEPHONE ENCOUNTER
Patient returned call to reschd appointment, there currently are no OVL's on your schd for the month, ok for procedure to be schd in August ( soonest apt) or with another provider?

## 2025-07-25 ENCOUNTER — TELEPHONE (OUTPATIENT)
Dept: FAMILY MEDICINE CLINIC | Facility: CLINIC | Age: 55
End: 2025-07-25

## 2025-08-05 ENCOUNTER — OFFICE VISIT (OUTPATIENT)
Dept: URGENT CARE | Age: 55
End: 2025-08-05
Payer: COMMERCIAL

## 2025-08-05 VITALS
RESPIRATION RATE: 18 BRPM | HEART RATE: 88 BPM | SYSTOLIC BLOOD PRESSURE: 122 MMHG | DIASTOLIC BLOOD PRESSURE: 65 MMHG | OXYGEN SATURATION: 100 % | WEIGHT: 230 LBS | BODY MASS INDEX: 39.6 KG/M2 | TEMPERATURE: 99 F

## 2025-08-05 DIAGNOSIS — J02.9 SORE THROAT: ICD-10-CM

## 2025-08-05 DIAGNOSIS — J40 SINOBRONCHITIS: Primary | ICD-10-CM

## 2025-08-05 DIAGNOSIS — J32.9 SINOBRONCHITIS: Primary | ICD-10-CM

## 2025-08-05 LAB — S PYO AG THROAT QL: NEGATIVE

## 2025-08-05 PROCEDURE — 99213 OFFICE O/P EST LOW 20 MIN: CPT | Performed by: STUDENT IN AN ORGANIZED HEALTH CARE EDUCATION/TRAINING PROGRAM

## 2025-08-05 PROCEDURE — 87880 STREP A ASSAY W/OPTIC: CPT | Performed by: STUDENT IN AN ORGANIZED HEALTH CARE EDUCATION/TRAINING PROGRAM

## 2025-08-05 RX ORDER — AZITHROMYCIN 250 MG/1
TABLET, FILM COATED ORAL
Qty: 6 TABLET | Refills: 0 | Status: SHIPPED | OUTPATIENT
Start: 2025-08-05 | End: 2025-08-09

## 2025-08-05 RX ORDER — BENZONATATE 100 MG/1
100 CAPSULE ORAL 3 TIMES DAILY PRN
Qty: 20 CAPSULE | Refills: 0 | Status: SHIPPED | OUTPATIENT
Start: 2025-08-05

## 2025-08-15 ENCOUNTER — OFFICE VISIT (OUTPATIENT)
Dept: URGENT CARE | Age: 55
End: 2025-08-15
Payer: COMMERCIAL

## (undated) DEVICE — GLOVE SRG BIOGEL ORTHOPEDIC 7

## (undated) DEVICE — SCD SEQUENTIAL COMPRESSION COMFORT SLEEVE MEDIUM KNEE LENGTH: Brand: KENDALL SCD

## (undated) DEVICE — NEEDLE SPINAL 22G X 3.5IN  QUINCKE

## (undated) DEVICE — SUT PROLENE 3-0 SH 36 IN 8522H

## (undated) DEVICE — INSUFFLATION TUBING PRIMFLO

## (undated) DEVICE — TUBING SUCTION 5MM X 12 FT

## (undated) DEVICE — STERILE BETHLEHEM NASAL PACK: Brand: CARDINAL HEALTH

## (undated) DEVICE — SPLINT 1527005 10PK PAIR NASAL STD THICK

## (undated) DEVICE — INTENDED FOR TISSUE SEPARATION, AND OTHER PROCEDURES THAT REQUIRE A SHARP SURGICAL BLADE TO PUNCTURE OR CUT.: Brand: BARD-PARKER ® CARBON RIB-BACK BLADES

## (undated) DEVICE — NEURO PATTIES 1/2 X 1 1/2

## (undated) DEVICE — 1820 FOAM BLOCK NEEDLE COUNTER: Brand: DEVON

## (undated) DEVICE — SYRINGE CATH TIP 50ML

## (undated) DEVICE — TELFA NON-ADHERENT ABSORBENT DRESSING: Brand: TELFA

## (undated) DEVICE — SUT PLAIN 4-0 SC-1 18 IN 1828H

## (undated) DEVICE — GLOVE INDICATOR PI UNDERGLOVE SZ 8 BLUE

## (undated) DEVICE — SUT CHROMIC 4-0 P-3 18 MM 1654G

## (undated) DEVICE — NEEDLE 25G X 1 1/2

## (undated) DEVICE — BETHLEHEM UNIVERSAL MINOR VAG: Brand: CARDINAL HEALTH

## (undated) DEVICE — SPECIMEN CONTAINER STERILE PEEL PACK

## (undated) DEVICE — GLOVE PI ULTRA TOUCH SZ.7.5

## (undated) DEVICE — MAYO STAND COVER: Brand: CONVERTORS

## (undated) DEVICE — SYRINGE 3ML LL